# Patient Record
Sex: MALE | Race: WHITE | NOT HISPANIC OR LATINO | Employment: FULL TIME | URBAN - METROPOLITAN AREA
[De-identification: names, ages, dates, MRNs, and addresses within clinical notes are randomized per-mention and may not be internally consistent; named-entity substitution may affect disease eponyms.]

---

## 2017-04-30 ENCOUNTER — OFFICE VISIT (OUTPATIENT)
Dept: URGENT CARE | Facility: MEDICAL CENTER | Age: 52
End: 2017-04-30
Payer: COMMERCIAL

## 2017-04-30 PROCEDURE — 99213 OFFICE O/P EST LOW 20 MIN: CPT

## 2017-05-15 ENCOUNTER — ALLSCRIPTS OFFICE VISIT (OUTPATIENT)
Dept: OTHER | Facility: OTHER | Age: 52
End: 2017-05-15

## 2017-05-15 DIAGNOSIS — Z12.5 ENCOUNTER FOR SCREENING FOR MALIGNANT NEOPLASM OF PROSTATE: ICD-10-CM
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
## 2018-01-13 VITALS
WEIGHT: 207.38 LBS | RESPIRATION RATE: 16 BRPM | HEIGHT: 71 IN | HEART RATE: 76 BPM | SYSTOLIC BLOOD PRESSURE: 128 MMHG | DIASTOLIC BLOOD PRESSURE: 90 MMHG | BODY MASS INDEX: 29.03 KG/M2

## 2018-01-16 NOTE — PROGRESS NOTES
Assessment   1  Tick bite (919 4,E906 4) (W57 XXXA)    Plan    Doxycycline Hyclate 100 MG Oral Capsule; TAKE 1 CAPSULE EVERY 12 HOURS DAILY; Therapy: 30Apr2017 to (Evaluate:[id number])  Requested for: 30Apr2017; Last Rx:30Apr2017; Status: ACTIVE Ordered     Rx By: Kesha Sampson; Dispense: 14 Days ; #:28 Capsule; Refill: 0;      For: Tick bite, initial encounter; MARTY = N; Verified Transmission to [id number] Last Updated By: System, [employer]; 5/15/2017 3:17:48 PM      Discussion/Summary   Discussion Summary:    Take antibiotic as directed: eat yogurt to avoid GI upset  Medication Side Effects Reviewed: Possible side effects of new medications were reviewed with the patient/guardian today  Understands and agrees with treatment plan: The treatment plan was reviewed with the patient/guardian  The patient/guardian understands and agrees with the treatment plan    Counseling Documentation With Imm: The patient was counseled regarding diagnostic results,-- instructions for management,-- risk factor reductions,-- prognosis,-- patient and family education,-- impressions,-- risks and benefits of treatment options,-- importance of compliance with treatment  Follow Up Instructions: Follow Up with your Primary Care Provider in 1-2 days  If your symptoms worsen, go to the nearest [place] [address] Emergency Department  Chief Complaint   1  Skin Wound  Chief Complaint Free Text Note Form: pt  presents with 2 day hx of multiple tick bites to right leg      History of Present Illness   HPI: This is a 63-year-old male complaining of tick bite x2 days  Patient reports he pulled to take soft his right leg  Patient now reports erythema warmth such bull's eye lesion  patient denies any fever, chills, nausea, vomiting, eye joint pain muscle aches, numbness, tingling, loss sensation chest pain shortness of breath  Patient is unsure how long takes for attached to him      Hospital Based Practices Required Assessment:      Pain Assessment      the patient states they do not have pain  Abuse And Domestic Violence Screen       Yes, the patient is safe at home  -- The patient states no one is hurting them  Depression And Suicide Screen  No, the patient has not had thoughts of hurting themself  No, the patient has not felt depressed in the past 7 days  Prefered Language is  english  Primary Language is  english  Readiness To Learn: Receptive  Barriers To Learning: none  Preferred Learning: demonstration      Education Completed: disease/condition,-- medications-- and-- treatment/procedure      Teaching Method: verbal      Person Taught: patient      Evaluation Of Learning: verbalized/demonstrated understanding      Review of Systems   Focused-Male:      Constitutional: no fever or chills, feels well, no tiredness, no recent weight loss or weight gain  ENT: no complaints of earache, no loss of hearing, no nosebleeds or nasal discharge, no sore throat or hoarseness  Cardiovascular: no complaints of slow or fast heart rate, no chest pain, no palpitations, no leg claudication or lower extremity edema  Respiratory: no complaints of shortness of breath, no wheezing or cough, no dyspnea on exertion, no orthopnea or PND  Gastrointestinal: no complaints of abdominal pain, no constipation, no nausea or vomiting, no diarrhea or bloody stools  Genitourinary: no complaints of dysuria or incontinence, no hesitancy, no nocturia, no genital lesion, no inadequacy of penile erection  Musculoskeletal: no complaints of arthralgia, no myalgia, no joint swelling or stiffness, no limb pain or swelling  Integumentary: no complaints of skin rash or lesion, no itching or dry skin, no skin wounds-- and-- as noted in HPI  Neurological: no complaints of headache, no confusion, no numbness or tingling, no dizziness or fainting        Past Medical History   Active Problems And Past Medical History Reviewed: The active problems and past medical history were reviewed and updated today  Family History   Family History Reviewed: The family history was reviewed and updated today  Social History    · Current some day smoker (305 1) (F17 200)  Social History Reviewed: The social history was reviewed and updated today  Surgical History   Surgical History Reviewed: The surgical history was reviewed and updated today  Current Meds    1  No Reported Medications Recorded  Medication List Reviewed: The medication list was reviewed and updated today  Allergies   1  No Known Drug Allergies    Vitals   Signs   Recorded: 30Apr2017 10:56AM   Temperature: 97 8 F  Heart Rate: 76  Respiration: 14  Systolic: 004  Diastolic: 78  Weight: 789 lb   O2 Saturation: 99  Pain Scale: 0    Physical Exam        Constitutional      General appearance: No acute distress, well appearing and well nourished  Pulmonary      Respiratory effort: No increased work of breathing or signs of respiratory distress  Auscultation of lungs: Clear to auscultation  Cardiovascular      Auscultation of heart: Normal rate and rhythm, normal S1 and S2, without murmurs  Lymphatic      Palpation of lymph nodes in neck: No lymphadenopathy  Skin      Examination of the skin for lesions: Abnormal  -- RLE: 2 2 bites no remaining foreign body bull's eye lesion  Message   Return to work or school:    Beckie Gray is under my professional care  He was seen in my office on 04/302017        Please excuse illness           Signatures    Electronically signed by : Vishnu Alicea, [facility]; Jan 13 2018 10:28AM EST                       (Author)     Electronically signed by : OWEN Martinez ; Zuhair 15 2018 11:47AM EST                       (Co-author)

## 2018-01-18 NOTE — PROGRESS NOTES
Assessment    1  Tick bite (919 4,E906 4) (W57 XXXA)    Plan  Tick bite, initial encounter    · Doxycycline Hyclate 100 MG Oral Capsule; TAKE 1 CAPSULE EVERY 12 HOURS  DAILY    Discussion/Summary    Multiple tick bites which are difficult to assess via virtual visit  I have explained to the patient that proper evaluation for a Bulls eye rash is necessary in order to diagnose Lyme's disease and determine the course of treatment which can be up to 21 days of Doxycycline, therefore I recommend that he proceed to one of our walk-in centers for further evaluation and treatment  Patient verbalizes understanding and states he will go to [facility]  The treatment plan was reviewed with the patient/guardian  The patient/guardian understands and agrees with the treatment plan     If your symptoms worsen follow up at the nearest [place] [address] Emergency Room  If your symptoms worsen follow up at the nearest Emergency Room  Chief Complaint    1  Skin Wound  tick bite      History of Present Illness  45 yo male c/o 2 areas of tick bite two days ago, states he pulled out a tick from one of the sites, now has pain and redness surrounding the area  No fever  No drainage from the sites  Review of Systems    Constitutional: no fever or chills, feels well, no tiredness, no recent weight loss or weight gain  Integumentary: as noted in HPI  Active Problems    1  Tick bite, initial encounter (919 4,E906 4) [facility])    Past Medical History    The active problems and past medical history were reviewed and updated today  Social History    · Current some day smoker (305 1) (F17 200)  The social history was reviewed and updated today  Current Meds    The medication list was reviewed and updated today  Allergies    1   No Known Drug Allergies    Vitals  Signs   Recorded: 30Apr2017 10:56AM   Temperature: 97 8 F  Heart Rate: 76  Respiration: 14  Systolic: 568  Diastolic: 78  Weight: 957 lb   O2 Saturation: 99  Pain Scale: 0    Observations Made  AAOx3 in NAD  Right thigh: 2 large lesions with central scabbing and surrounding erythema  Lighting and camera are making it difficulty to assess for possible bulls eye rash surrounding the lesions        Signatures   Electronically signed by : RHYS Calloway ; Apr 30 2017  4:21PM EST                       (Author)

## 2019-04-28 ENCOUNTER — OFFICE VISIT (OUTPATIENT)
Dept: URGENT CARE | Facility: MEDICAL CENTER | Age: 54
End: 2019-04-28
Payer: COMMERCIAL

## 2019-04-28 VITALS
DIASTOLIC BLOOD PRESSURE: 118 MMHG | BODY MASS INDEX: 31.3 KG/M2 | WEIGHT: 223.6 LBS | TEMPERATURE: 97.4 F | HEIGHT: 71 IN | OXYGEN SATURATION: 98 % | SYSTOLIC BLOOD PRESSURE: 160 MMHG | HEART RATE: 76 BPM | RESPIRATION RATE: 18 BRPM

## 2019-04-28 DIAGNOSIS — L03.119 CELLULITIS OF FOOT: Primary | ICD-10-CM

## 2019-04-28 PROCEDURE — 99213 OFFICE O/P EST LOW 20 MIN: CPT | Performed by: PHYSICIAN ASSISTANT

## 2019-04-28 RX ORDER — IBUPROFEN 600 MG/1
600 TABLET ORAL EVERY 8 HOURS PRN
Qty: 15 TABLET | Refills: 0 | Status: SHIPPED | OUTPATIENT
Start: 2019-04-28 | End: 2019-05-16 | Stop reason: ALTCHOICE

## 2019-04-28 RX ORDER — CEPHALEXIN 500 MG/1
500 CAPSULE ORAL EVERY 6 HOURS SCHEDULED
Qty: 28 CAPSULE | Refills: 0 | Status: SHIPPED | OUTPATIENT
Start: 2019-04-28 | End: 2019-05-05

## 2019-05-16 ENCOUNTER — OFFICE VISIT (OUTPATIENT)
Dept: FAMILY MEDICINE CLINIC | Facility: CLINIC | Age: 54
End: 2019-05-16
Payer: COMMERCIAL

## 2019-05-16 VITALS
OXYGEN SATURATION: 97 % | WEIGHT: 218 LBS | DIASTOLIC BLOOD PRESSURE: 108 MMHG | SYSTOLIC BLOOD PRESSURE: 148 MMHG | TEMPERATURE: 97.1 F | HEIGHT: 71 IN | BODY MASS INDEX: 30.52 KG/M2 | HEART RATE: 85 BPM | RESPIRATION RATE: 17 BRPM

## 2019-05-16 DIAGNOSIS — M25.50 ARTHRALGIA, UNSPECIFIED JOINT: ICD-10-CM

## 2019-05-16 DIAGNOSIS — Z12.5 SCREENING FOR PROSTATE CANCER: ICD-10-CM

## 2019-05-16 DIAGNOSIS — Z13.1 SCREENING FOR DIABETES MELLITUS: ICD-10-CM
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
## 2019-05-16 DIAGNOSIS — I10 ESSENTIAL HYPERTENSION: Primary | ICD-10-CM

## 2019-05-16 PROCEDURE — 3008F BODY MASS INDEX DOCD: CPT | Performed by: FAMILY MEDICINE

## 2019-05-16 PROCEDURE — 99214 OFFICE O/P EST MOD 30 MIN: CPT | Performed by: FAMILY MEDICINE

## 2019-05-16 RX ORDER — LISINOPRIL 10 MG/1
10 TABLET ORAL DAILY
Qty: 30 TABLET | Refills: 3 | Status: SHIPPED | OUTPATIENT
Start: 2019-05-16 | End: 2019-07-16 | Stop reason: SDUPTHER

## 2019-06-01 ENCOUNTER — LAB (OUTPATIENT)
Dept: LAB | Facility: CLINIC | Age: 54
End: 2019-06-01
Payer: COMMERCIAL

## 2019-06-01 DIAGNOSIS — Z13.1 SCREENING FOR DIABETES MELLITUS: ICD-10-CM

## 2019-06-01 DIAGNOSIS — Z12.5 SCREENING FOR PROSTATE CANCER: ICD-10-CM
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
## 2019-06-01 DIAGNOSIS — M25.50 ARTHRALGIA, UNSPECIFIED JOINT: ICD-10-CM

## 2019-06-01 LAB
ALBUMIN SERPL BCP-MCNC: 3.6 G/DL (ref 3.5–5)
ALP SERPL-CCNC: 69 U/L (ref 46–116)
ALT SERPL W P-5'-P-CCNC: 25 U/L (ref 12–78)
ANION GAP SERPL CALCULATED.3IONS-SCNC: 11 MMOL/L (ref 4–13)
AST SERPL W P-5'-P-CCNC: 22 U/L (ref 5–45)
BASOPHILS # BLD AUTO: 0.08 THOUSANDS/ΜL (ref 0–0.1)
BASOPHILS NFR BLD AUTO: 1 % (ref 0–1)
BILIRUB SERPL-MCNC: 0.5 MG/DL (ref 0.2–1)
BUN SERPL-MCNC: 12 MG/DL (ref 5–25)
CALCIUM SERPL-MCNC: 9.2 MG/DL (ref 8.3–10.1)
CHLORIDE SERPL-SCNC: 105 MMOL/L (ref 100–108)
CHOLEST SERPL-MCNC: 179 MG/DL (ref 50–200)
CO2 SERPL-SCNC: 23 MMOL/L (ref 21–32)
CREAT SERPL-MCNC: 0.87 MG/DL (ref 0.6–1.3)
CRP SERPL QL: 6.1 MG/L
EOSINOPHIL # BLD AUTO: 0.14 THOUSAND/ΜL (ref 0–0.61)
EOSINOPHIL NFR BLD AUTO: 2 % (ref 0–6)
ERYTHROCYTE [DISTWIDTH] IN BLOOD BY AUTOMATED COUNT: 13.1 % (ref 11.6–15.1)
ERYTHROCYTE [SEDIMENTATION RATE] IN BLOOD: 1 MM/HOUR (ref 0–10)
GFR SERPL CREATININE-BSD FRML MDRD: 99 ML/MIN/1.73SQ M
GLUCOSE P FAST SERPL-MCNC: 90 MG/DL (ref 65–99)
HCT VFR BLD AUTO: 51.3 % (ref 36.5–49.3)
HDLC SERPL-MCNC: 34 MG/DL (ref 40–60)
HGB BLD-MCNC: 17.8 G/DL (ref 12–17)
IMM GRANULOCYTES # BLD AUTO: 0.03 THOUSAND/UL (ref 0–0.2)
IMM GRANULOCYTES NFR BLD AUTO: 1 % (ref 0–2)
LDLC SERPL CALC-MCNC: 119 MG/DL (ref 0–100)
LYMPHOCYTES # BLD AUTO: 1.56 THOUSANDS/ΜL (ref 0.6–4.47)
LYMPHOCYTES NFR BLD AUTO: 25 % (ref 14–44)
MCH RBC QN AUTO: 30.4 PG (ref 26.8–34.3)
MCHC RBC AUTO-ENTMCNC: 34.7 G/DL (ref 31.4–37.4)
MCV RBC AUTO: 88 FL (ref 82–98)
MONOCYTES # BLD AUTO: 0.37 THOUSAND/ΜL (ref 0.17–1.22)
MONOCYTES NFR BLD AUTO: 6 % (ref 4–12)
NEUTROPHILS # BLD AUTO: 4.13 THOUSANDS/ΜL (ref 1.85–7.62)
NEUTS SEG NFR BLD AUTO: 65 % (ref 43–75)
NRBC BLD AUTO-RTO: 0 /100 WBCS
PLATELET # BLD AUTO: 189 THOUSANDS/UL (ref 149–390)
PMV BLD AUTO: 9.7 FL (ref 8.9–12.7)
POTASSIUM SERPL-SCNC: 4.5 MMOL/L (ref 3.5–5.3)
PROT SERPL-MCNC: 7 G/DL (ref 6.4–8.2)
PSA SERPL-MCNC: 0.4 NG/ML (ref 0–4)
RBC # BLD AUTO: 5.86 MILLION/UL (ref 3.88–5.62)
SODIUM SERPL-SCNC: 139 MMOL/L (ref 136–145)
TRIGL SERPL-MCNC: 128 MG/DL
WBC # BLD AUTO: 6.31 THOUSAND/UL (ref 4.31–10.16)

## 2019-06-01 PROCEDURE — 80053 COMPREHEN METABOLIC PANEL: CPT

## 2019-06-01 PROCEDURE — 86430 RHEUMATOID FACTOR TEST QUAL: CPT

## 2019-06-01 PROCEDURE — G0103 PSA SCREENING: HCPCS

## 2019-06-01 PROCEDURE — 86757 RICKETTSIA ANTIBODY: CPT

## 2019-06-01 PROCEDURE — 86038 ANTINUCLEAR ANTIBODIES: CPT

## 2019-06-01 PROCEDURE — 80061 LIPID PANEL: CPT

## 2019-06-01 PROCEDURE — 86140 C-REACTIVE PROTEIN: CPT

## 2019-06-01 PROCEDURE — 86618 LYME DISEASE ANTIBODY: CPT

## 2019-06-01 PROCEDURE — 85025 COMPLETE CBC W/AUTO DIFF WBC: CPT

## 2019-06-01 PROCEDURE — 36415 COLL VENOUS BLD VENIPUNCTURE: CPT

## 2019-06-01 PROCEDURE — 85652 RBC SED RATE AUTOMATED: CPT

## 2019-06-03 LAB
RHEUMATOID FACT SER QL LA: NEGATIVE
RYE IGE QN: NEGATIVE

## 2019-06-04 LAB
B BURGDOR IGG SER IA-ACNC: 0.13
B BURGDOR IGM SER IA-ACNC: 0.2

## 2019-06-05 LAB — R RICKETTSI IGM SER QL IA: 0.3 INDEX (ref 0–0.89)

## 2019-07-16 ENCOUNTER — OFFICE VISIT (OUTPATIENT)
Dept: FAMILY MEDICINE CLINIC | Facility: CLINIC | Age: 54
End: 2019-07-16
Payer: COMMERCIAL

## 2019-07-16 VITALS
BODY MASS INDEX: 29.42 KG/M2 | SYSTOLIC BLOOD PRESSURE: 124 MMHG | HEART RATE: 68 BPM | WEIGHT: 217.2 LBS | DIASTOLIC BLOOD PRESSURE: 90 MMHG | HEIGHT: 72 IN | OXYGEN SATURATION: 96 % | TEMPERATURE: 97.3 F | RESPIRATION RATE: 16 BRPM

## 2019-07-16 DIAGNOSIS — I10 ESSENTIAL HYPERTENSION: Primary | ICD-10-CM

## 2019-07-16 PROCEDURE — 99213 OFFICE O/P EST LOW 20 MIN: CPT | Performed by: FAMILY MEDICINE

## 2019-07-16 RX ORDER — LOSARTAN POTASSIUM 50 MG/1
50 TABLET ORAL DAILY
Qty: 30 TABLET | Refills: 5 | Status: SHIPPED | OUTPATIENT
Start: 2019-07-16 | End: 2019-08-23 | Stop reason: DRUGHIGH

## 2019-07-16 NOTE — PROGRESS NOTES
Assessment/Plan:    No problem-specific Assessment & Plan notes found for this encounter  Diagnoses and all orders for this visit:    Essential hypertension  Comments:  Pt stable on exam   With cough, will switch pt to an ARB, Losartan  Continue low salt diet, regular activity  Pt urged again to stop smoking  Orders:  -     losartan (COZAAR) 50 mg tablet; Take 1 tablet (50 mg total) by mouth daily  -     Basic metabolic panel; Future          Subjective:      Patient ID: Chanelle Gould is a 48 y o  male  Nyla Perez is here today in f/u today  Taking his BP med, watching salt intake  Unfortunately, he has a dry cough - starting soon after going on Lisinopril  The following portions of the patient's history were reviewed and updated as appropriate: allergies, current medications, past family history, past social history, past surgical history and problem list     History reviewed  No pertinent past medical history  Current Outpatient Medications:     losartan (COZAAR) 50 mg tablet, Take 1 tablet (50 mg total) by mouth daily, Disp: 30 tablet, Rfl: 5    No Known Allergies    Social History     Tobacco Use    Smoking status: Current Every Day Smoker     Packs/day: 0 50     Years: 32 00     Pack years: 16 00    Smokeless tobacco: Never Used   Substance Use Topics    Alcohol use: Yes     Alcohol/week: 1 0 standard drinks     Types: 1 Cans of beer per week     Frequency: Monthly or less     Drinks per session: 1 or 2     Binge frequency: Less than monthly     Comment: rare social use    Drug use: Never       Review of Systems   Constitutional: Negative for activity change and fever  HENT: Negative for congestion and rhinorrhea  No throat or tongue swelling  Respiratory: Positive for cough  Negative for shortness of breath  Cardiovascular: Negative for chest pain           Objective:      /90   Pulse 68   Temp (!) 97 3 °F (36 3 °C)   Resp 16   Ht 5' 11 93" (1 827 m) Wt 98 5 kg (217 lb 3 2 oz)   SpO2 96%   BMI 29 52 kg/m²   Repeat /96 RA seated  Physical Exam   Constitutional: He is oriented to person, place, and time  He appears well-developed and well-nourished  No distress  HENT:   Head: Normocephalic and atraumatic  Eyes: Conjunctivae are normal    Cardiovascular: Normal rate, regular rhythm and normal heart sounds  Exam reveals no gallop and no friction rub  No murmur heard  Pulmonary/Chest: Effort normal and breath sounds normal  No stridor  No respiratory distress  He has no wheezes  He has no rales  Neurological: He is alert and oriented to person, place, and time  Skin: He is not diaphoretic  Psychiatric: He has a normal mood and affect  His behavior is normal  Judgment and thought content normal    Nursing note and vitals reviewed

## 2019-08-23 ENCOUNTER — OFFICE VISIT (OUTPATIENT)
Dept: FAMILY MEDICINE CLINIC | Facility: CLINIC | Age: 54
End: 2019-08-23
Payer: COMMERCIAL

## 2019-08-23 VITALS
WEIGHT: 218 LBS | BODY MASS INDEX: 29.53 KG/M2 | SYSTOLIC BLOOD PRESSURE: 142 MMHG | HEIGHT: 72 IN | HEART RATE: 69 BPM | DIASTOLIC BLOOD PRESSURE: 100 MMHG | TEMPERATURE: 96.4 F | RESPIRATION RATE: 16 BRPM | OXYGEN SATURATION: 99 %

## 2019-08-23 DIAGNOSIS — Z72.0 TOBACCO USE: ICD-10-CM

## 2019-08-23 DIAGNOSIS — Z00.00 WELLNESS EXAMINATION: Primary | ICD-10-CM

## 2019-08-23 DIAGNOSIS — M25.50 ARTHRALGIA, UNSPECIFIED JOINT: ICD-10-CM

## 2019-08-23 DIAGNOSIS — Z13.1 SCREENING FOR DIABETES MELLITUS: ICD-10-CM

## 2019-08-23 DIAGNOSIS — I10 ESSENTIAL HYPERTENSION: ICD-10-CM

## 2019-08-23 DIAGNOSIS — Z12.11 SCREENING FOR COLON CANCER: ICD-10-CM

## 2019-08-23 DIAGNOSIS — G89.29 CHRONIC MIDLINE LOW BACK PAIN WITHOUT SCIATICA: ICD-10-CM

## 2019-08-23 DIAGNOSIS — M54.50 CHRONIC MIDLINE LOW BACK PAIN WITHOUT SCIATICA: ICD-10-CM

## 2019-08-23 DIAGNOSIS — Z12.5 SCREENING FOR PROSTATE CANCER: ICD-10-CM

## 2019-08-23 PROCEDURE — 99396 PREV VISIT EST AGE 40-64: CPT | Performed by: FAMILY MEDICINE

## 2019-08-23 RX ORDER — VARENICLINE TARTRATE 25 MG
KIT ORAL
Qty: 53 TABLET | Refills: 0 | Status: SHIPPED | OUTPATIENT
Start: 2019-08-23 | End: 2021-11-06 | Stop reason: ALTCHOICE

## 2019-08-23 RX ORDER — LOSARTAN POTASSIUM 100 MG/1
100 TABLET ORAL DAILY
Qty: 30 TABLET | Refills: 5 | Status: SHIPPED | OUTPATIENT
Start: 2019-08-23 | End: 2020-04-06

## 2019-08-23 NOTE — PROGRESS NOTES
Assessment/Plan:    No problem-specific Assessment & Plan notes found for this encounter  Diagnoses and all orders for this visit:    Wellness examination  Comments:  Amelia Daniels is stable on exam   He is to continue to focus on a low salt diet, stopping smoking, and regular exercise  Screening for diabetes mellitus    Screening for prostate cancer    Screening for colon cancer  Comments:  Pt referred locally to GI  Orders:  -     Ambulatory referral to Gastroenterology; Future    Tobacco use  Comments:  Discussed at length, potential treatments, potential R/SE, etc - pt reports that he wants to try to quit -> will start Chantix  Orders:  -     varenicline (CHANTIX BRANDON) 0 5 MG X 11 & 1 MG X 42 tablet; Take one 0 5mg tab by mouth 1x daily for 3 days, then increase to one 0 5mg tab 2x daily for 3 days, then increase to one 1mg tab 2x daily    Essential hypertension  Comments:  BP still too high -> will increase Losartan to 100mg QD at this time  Orders:  -     losartan (COZAAR) 100 MG tablet; Take 1 tablet (100 mg total) by mouth daily    Arthralgia, unspecified joint  Comments:  Chronic; stable - pt declines referral to Rheumatology at this time  Chronic midline low back pain without sciatica  Comments:  Ongoing - pt declines referral for MRI at this time  Continue stretching, heat to the region, etc           Subjective:      Patient ID: Jax Garcia is a 48 y o  male  Amelia Daniels is here in f/u  Needs colon cancer screening - discussed at length  Still smoking - we discussed  No hx of seizures  PSA  0 4, Gl 90, Cr/LFTs normal,   Declines GIOVANNI/prostatic exam, imaging for back (chronic low back pain; hx epidurals in past; no sciatica; no hx of surgery), Rheumatology referral (arthralgias - hands and knees; ?psoriatic arthritis?) today        The following portions of the patient's history were reviewed and updated as appropriate: allergies, current medications, past family history, past social history, past surgical history and problem list     History reviewed  No pertinent past medical history  Current Outpatient Medications:     losartan (COZAAR) 100 MG tablet, Take 1 tablet (100 mg total) by mouth daily, Disp: 30 tablet, Rfl: 5    varenicline (CHANTIX BRANDON) 0 5 MG X 11 & 1 MG X 42 tablet, Take one 0 5mg tab by mouth 1x daily for 3 days, then increase to one 0 5mg tab 2x daily for 3 days, then increase to one 1mg tab 2x daily, Disp: 53 tablet, Rfl: 0    No Known Allergies    Social History     Tobacco Use    Smoking status: Current Every Day Smoker     Packs/day: 0 50     Years: 32 00     Pack years: 16 00    Smokeless tobacco: Never Used   Substance Use Topics    Alcohol use: Yes     Alcohol/week: 1 0 standard drinks     Types: 1 Cans of beer per week     Frequency: Monthly or less     Drinks per session: 1 or 2     Binge frequency: Less than monthly     Comment: rare social use    Drug use: Never         Review of Systems   Constitutional: Negative for activity change  Respiratory: Negative for shortness of breath  Cardiovascular: Negative for chest pain  Gastrointestinal: Negative for abdominal pain and blood in stool  Genitourinary: Negative for decreased urine volume  Musculoskeletal: Positive for arthralgias and back pain  Skin:        Hx of psoriasis  Psychiatric/Behavioral: Negative for dysphoric mood  The patient is not nervous/anxious  Objective:      /100   Pulse 69   Temp (!) 96 4 °F (35 8 °C)   Resp 16   Ht 6' 0 24" (1 835 m)   Wt 98 9 kg (218 lb)   SpO2 99%   BMI 29 37 kg/m²   Repeat BP = 128/100 LA seated  Physical Exam   Constitutional: He is oriented to person, place, and time  He appears well-developed and well-nourished  No distress  HENT:   Head: Normocephalic and atraumatic     Right Ear: Hearing, tympanic membrane, external ear and ear canal normal    Left Ear: Hearing, tympanic membrane, external ear and ear canal normal  Mouth/Throat: Oropharynx is clear and moist  No oropharyngeal exudate  Eyes: Conjunctivae are normal    Neck: Normal range of motion  Neck supple  No thyromegaly present  Cardiovascular: Normal rate, regular rhythm and normal heart sounds  Exam reveals no gallop and no friction rub  No murmur heard  Pulmonary/Chest: Effort normal and breath sounds normal  No stridor  No respiratory distress  He has no wheezes  He has no rales  Abdominal: Soft  Bowel sounds are normal  He exhibits no distension and no mass  There is no tenderness  There is no rebound and no guarding  Lymphadenopathy:     He has no cervical adenopathy  Neurological: He is alert and oriented to person, place, and time  Skin: He is not diaphoretic  Pt with chronic, dry plaques to the extensor surfaces of the knees and elbows; no erythema - hx of psoriasis  Psychiatric: His speech is normal and behavior is normal  Judgment and thought content normal  His affect is blunt  Nursing note and vitals reviewed

## 2019-09-20 ENCOUNTER — OFFICE VISIT (OUTPATIENT)
Dept: URGENT CARE | Age: 54
End: 2019-09-20
Payer: COMMERCIAL

## 2019-09-20 VITALS
OXYGEN SATURATION: 98 % | RESPIRATION RATE: 20 BRPM | DIASTOLIC BLOOD PRESSURE: 104 MMHG | TEMPERATURE: 97.1 F | HEART RATE: 67 BPM | SYSTOLIC BLOOD PRESSURE: 138 MMHG

## 2019-09-20 DIAGNOSIS — S80.861A INSECT BITE OF LOWER LEG WITH LOCAL REACTION, RIGHT, INITIAL ENCOUNTER: Primary | ICD-10-CM
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
## 2019-09-20 PROCEDURE — 99203 OFFICE O/P NEW LOW 30 MIN: CPT | Performed by: PHYSICIAN ASSISTANT

## 2019-09-20 RX ORDER — LOSARTAN POTASSIUM 50 MG/1
TABLET ORAL
COMMUNITY
Start: 2019-09-17 | End: 2021-04-30 | Stop reason: DRUGHIGH

## 2019-09-20 RX ORDER — PREDNISONE 10 MG/1
TABLET ORAL
Qty: 21 TABLET | Refills: 0 | Status: SHIPPED | OUTPATIENT
Start: 2019-09-20 | End: 2021-11-06 | Stop reason: ALTCHOICE

## 2019-09-20 NOTE — PATIENT INSTRUCTIONS
Cool compresses as directed    Follow-up with your primary care physician in 3-4 days if symptoms persist    Go to emergency room if your symptoms are worsening    May take Benadryl as needed

## 2019-09-20 NOTE — PROGRESS NOTES
[facility]        NAME: Consuelo Menon is a 47 y o  male  : 1965    MRN: [id number]  DATE: 2019  TIME: 6:01 PM    Assessment and Plan   Insect bite of lower leg with local reaction, right, initial encounter [D01 622G, W57  XXXA]  1  Insect bite of lower leg with local reaction, right, initial encounter  predniSONE 10 mg tablet         Patient Instructions       Follow up with PCP in 3-5 days  Proceed to  ER if symptoms worsen  Chief Complaint     Chief Complaint   Patient presents with   Mary Isis 83     right leg a spider bite it's been for 2 days  History of Present Illness       Patient here for evaluation of a bug bite to his right lower leg  Patient has to bites on the lower leg  He denies any numbness, tingling, fevers, chills, body aches  Patient states they are very itchy  Review of Systems   Review of Systems   Constitutional: Negative  Skin: Positive for rash  Neurological: Negative            Current Medications       Current Outpatient Medications:     losartan (COZAAR) 100 MG tablet, Take 1 tablet (100 mg total) by mouth daily, Disp: 30 tablet, Rfl: 5    losartan (COZAAR) 50 mg tablet, , Disp: , Rfl:     varenicline (CHANTIX BRANDON) 0 5 MG X 11 & 1 MG X 42 tablet, Take one 0 5mg tab by mouth 1x daily for 3 days, then increase to one 0 5mg tab 2x daily for 3 days, then increase to one 1mg tab 2x daily, Disp: 53 tablet, Rfl: 0    predniSONE 10 mg tablet, Six tablets day 1; 5 tablets day to; 4 tablets day 3; 3 tablets day 4; 2 tablets day 5; and 1 tablet day 6, Disp: 21 tablet, Rfl: 0    Current Allergies     Allergies as of 2019    (No Known Allergies)            The following portions of the patient's history were reviewed and updated as appropriate: allergies, current medications, past family history, past medical history, past social history, past surgical history and problem list      Past Medical History:   Diagnosis Date    Hypertension        Past Surgical History:   Procedure Laterality Date    KNEE SURGERY         History reviewed  No pertinent family history  Medications have been verified  Objective   BP (!) 138/104 (BP Location: Left arm, Patient Position: Sitting, Cuff Size: Large)   Pulse 67   Temp (!) 97 1 °F (36 2 °C) (Temporal)   Resp 20   SpO2 98%        Physical Exam     Physical Exam   Constitutional: He is oriented to person, place, and time  He appears well-developed and well-nourished  No distress  HENT:   Head: Normocephalic and atraumatic  Neurological: He is alert and oriented to person, place, and time  Skin: Skin is warm and dry  He is not diaphoretic  One dime-sized and 1 quarter-sized macular lesion of the right lower extremity  No warmth  No induration  No central clearing  No bulls eye rash  No tenderness   Psychiatric: He has a normal mood and affect  His behavior is normal  Judgment and thought content normal    Nursing note and vitals reviewed

## 2019-10-11 ENCOUNTER — OFFICE VISIT (OUTPATIENT)
Dept: URGENT CARE | Age: 54
End: 2019-10-11
Payer: COMMERCIAL

## 2019-10-11 VITALS
WEIGHT: 218 LBS | HEART RATE: 80 BPM | RESPIRATION RATE: 18 BRPM | SYSTOLIC BLOOD PRESSURE: 128 MMHG | BODY MASS INDEX: 30.52 KG/M2 | TEMPERATURE: 97.8 F | HEIGHT: 71 IN | DIASTOLIC BLOOD PRESSURE: 90 MMHG | OXYGEN SATURATION: 95 %

## 2019-10-11 DIAGNOSIS — J06.9 ACUTE UPPER RESPIRATORY INFECTION: Primary | ICD-10-CM

## 2019-10-11 DIAGNOSIS — H10.33 ACUTE CONJUNCTIVITIS OF BOTH EYES, UNSPECIFIED ACUTE CONJUNCTIVITIS TYPE: ICD-10-CM

## 2019-10-11 DIAGNOSIS — J01.20 ACUTE NON-RECURRENT ETHMOIDAL SINUSITIS: ICD-10-CM

## 2019-10-11 PROCEDURE — 99213 OFFICE O/P EST LOW 20 MIN: CPT | Performed by: FAMILY MEDICINE

## 2019-10-11 RX ORDER — OFLOXACIN 3 MG/ML
1 SOLUTION/ DROPS OPHTHALMIC 4 TIMES DAILY
Qty: 5 ML | Refills: 0 | Status: SHIPPED | OUTPATIENT
Start: 2019-10-11 | End: 2019-10-18

## 2019-10-11 RX ORDER — AMOXICILLIN AND CLAVULANATE POTASSIUM 875; 125 MG/1; MG/1
1 TABLET, FILM COATED ORAL EVERY 12 HOURS SCHEDULED
Qty: 20 TABLET | Refills: 0 | Status: SHIPPED | OUTPATIENT
Start: 2019-10-11 | End: 2019-10-21

## 2019-10-11 NOTE — PATIENT INSTRUCTIONS
Augmentin twice a day until finished (please take probiotics)  1 eye drop in each eye 4 times a day for 7-10 days  Cold/cough/sinus medication as needed  Tylenol, or ibuprofen (Advil/Motrin) as needed  Recheck/follow-up with family physician/ophthalmologist as needed  Please go to the hospital emergency department if needed

## 2019-10-11 NOTE — PROGRESS NOTES
[facility]        NAME: Analilia Lay is a 47 y o  male  : 1965    MRN: [id number]  DATE: 2019  TIME: 8:46 AM    Assessment and Plan   Acute upper respiratory infection [J06 9]  1  Acute upper respiratory infection     2  Acute non-recurrent ethmoidal sinusitis  amoxicillin-clavulanate (AUGMENTIN) 875-125 mg per tablet   3  Acute conjunctivitis of both eyes, unspecified acute conjunctivitis type  ofloxacin (OCUFLOX) 0 3 % ophthalmic solution         Patient Instructions     Patient Instructions   Augmentin twice a day until finished (please take probiotics)  1 eye drop in each eye 4 times a day for 7-10 days  Cold/cough/sinus medication as needed  Tylenol, or ibuprofen (Advil/Motrin) as needed  Recheck/follow-up with family physician/ophthalmologist as needed  Please go to the hospital emergency department if needed  Follow up with PCP/Ophthalmologist in 3-5 days  Proceed to  ER if symptoms worsen  Chief Complaint     Chief Complaint   Patient presents with    Cold Like Symptoms     sinus issue x 1 week           History of Present Illness       Congestion, sinus pressure, cough; redness and drainage of both eyes - patient is wearing glasses (patient does not wear contacts)      Review of Systems   Review of Systems   HENT: Positive for congestion and sinus pressure  Eyes: Positive for discharge and redness  Respiratory: Positive for cough  Cardiovascular: Negative  Musculoskeletal: Negative  Skin: Negative  Neurological: Negative            Current Medications       Current Outpatient Medications:     amoxicillin-clavulanate (AUGMENTIN) 875-125 mg per tablet, Take 1 tablet by mouth every 12 (twelve) hours for 20 doses, Disp: 20 tablet, Rfl: 0    losartan (COZAAR) 100 MG tablet, Take 1 tablet (100 mg total) by mouth daily, Disp: 30 tablet, Rfl: 5    losartan (COZAAR) 50 mg tablet, , Disp: , Rfl:     ofloxacin (OCUFLOX) 0 3 % ophthalmic solution, Administer 1 drop to both eyes 4 (four) times a day for 7 days, Disp: 5 mL, Rfl: 0    predniSONE 10 mg tablet, Six tablets day 1; 5 tablets day to; 4 tablets day 3; 3 tablets day 4; 2 tablets day 5; and 1 tablet day 6 (Patient not taking: Reported on 10/11/2019), Disp: 21 tablet, Rfl: 0    varenicline (CHANTIX BRANDON) 0 5 MG X 11 & 1 MG X 42 tablet, Take one 0 5mg tab by mouth 1x daily for 3 days, then increase to one 0 5mg tab 2x daily for 3 days, then increase to one 1mg tab 2x daily (Patient not taking: Reported on 10/11/2019), Disp: 53 tablet, Rfl: 0    Current Allergies     Allergies as of 10/11/2019    (No Known Allergies)            The following portions of the patient's history were reviewed and updated as appropriate: allergies, current medications, past family history, past medical history, past social history, past surgical history and problem list      Past Medical History:   Diagnosis Date    Hypertension        Past Surgical History:   Procedure Laterality Date    KNEE SURGERY         History reviewed  No pertinent family history  Medications have been verified  Objective   /90   Pulse 80   Temp 97 8 °F (36 6 °C) (Temporal)   Resp 18   Ht 5' 11" (1 803 m)   Wt 98 9 kg (218 lb)   SpO2 95%   BMI 30 40 kg/m²        Physical Exam     Physical Exam   Constitutional: He is oriented to person, place, and time  He appears well-developed and well-nourished  Patient appears uncomfortable   HENT:   Right Ear: External ear normal    Left Ear: External ear normal    Nasal congestion; discomfort over the ethmoid sinuses bilaterally; injection of the oropharynx   Eyes: Pupils are equal, round, and reactive to light  EOM are normal  Right eye exhibits discharge  Left eye exhibits discharge  Injection and drainage of both conjunctiva   Neck: Normal range of motion  Neck supple  Cardiovascular: Normal rate, regular rhythm and normal heart sounds     Pulmonary/Chest: Effort normal  No respiratory distress  He has no wheezes  Coarse breath sounds   Neurological: He is alert and oriented to person, place, and time  No nuchal rigidity   Skin:   Good color and turgor   Psychiatric: He has a normal mood and affect  His behavior is normal    Nursing note and vitals reviewed

## 2019-10-11 NOTE — LETTER
October 11, 2019     Patient: Yovany Dill   YOB: 1965   Date of Visit: 10/11/2019       To Whom It May Concern: It is my medical opinion that Christy Bradford may return to work on 10/14/2019  If you have any questions or concerns, please don't hesitate to call           Sincerely,        Juan Hitchcock DO    CC: No Recipients

## 2020-04-05 DIAGNOSIS — I10 ESSENTIAL HYPERTENSION: ICD-10-CM

## 2020-04-06 RX ORDER — LOSARTAN POTASSIUM 100 MG/1
TABLET ORAL
Qty: 90 TABLET | Refills: 1 | Status: SHIPPED | OUTPATIENT
Start: 2020-04-06 | End: 2021-04-15

## 2020-04-14 DIAGNOSIS — Z12.11 SCREENING FOR COLON CANCER: Primary | ICD-10-CM

## 2020-10-10 ENCOUNTER — TELEPHONE (OUTPATIENT)
Dept: GASTROENTEROLOGY | Facility: CLINIC | Age: 55
End: 2020-10-10

## 2021-04-15 DIAGNOSIS — I10 ESSENTIAL HYPERTENSION: ICD-10-CM

## 2021-04-15 RX ORDER — LOSARTAN POTASSIUM 100 MG/1
TABLET ORAL
Qty: 90 TABLET | Refills: 1 | Status: SHIPPED | OUTPATIENT
Start: 2021-04-15 | End: 2021-11-13 | Stop reason: SDUPTHER

## 2021-04-15 NOTE — TELEPHONE ENCOUNTER
Requested medication(s) are due for refill today: Yes  Patient has already received a courtesy refill: No  Other reason request has been forwarded to provider: per protocol         Patient scheduled

## 2021-04-30 ENCOUNTER — OFFICE VISIT (OUTPATIENT)
Dept: FAMILY MEDICINE CLINIC | Facility: CLINIC | Age: 56
End: 2021-04-30
Payer: COMMERCIAL

## 2021-04-30 VITALS
RESPIRATION RATE: 16 BRPM | TEMPERATURE: 97 F | HEART RATE: 97 BPM | OXYGEN SATURATION: 97 % | BODY MASS INDEX: 30.49 KG/M2 | DIASTOLIC BLOOD PRESSURE: 86 MMHG | SYSTOLIC BLOOD PRESSURE: 138 MMHG | WEIGHT: 218.6 LBS

## 2021-04-30 DIAGNOSIS — Z13.6 SCREENING FOR CARDIOVASCULAR CONDITION: ICD-10-CM

## 2021-04-30 DIAGNOSIS — G89.29 CHRONIC RIGHT-SIDED LOW BACK PAIN WITH RIGHT-SIDED SCIATICA: ICD-10-CM

## 2021-04-30 DIAGNOSIS — Z13.1 SCREENING FOR DIABETES MELLITUS: ICD-10-CM

## 2021-04-30 DIAGNOSIS — Z12.5 SCREENING FOR PROSTATE CANCER: ICD-10-CM

## 2021-04-30 DIAGNOSIS — M54.41 CHRONIC RIGHT-SIDED LOW BACK PAIN WITH RIGHT-SIDED SCIATICA: ICD-10-CM

## 2021-04-30 DIAGNOSIS — I10 ESSENTIAL HYPERTENSION: Primary | ICD-10-CM

## 2021-04-30 PROCEDURE — 99214 OFFICE O/P EST MOD 30 MIN: CPT | Performed by: FAMILY MEDICINE

## 2021-04-30 NOTE — PROGRESS NOTES
FAMILY PRACTICE OFFICE VISIT       NAME: Jody Ibarra  AGE: 54 y o  SEX: male       : 1965        MRN: [id number]    DATE: 2021  TIME: 2:27 PM    Assessment and Plan   1  Essential hypertension  Comments:  Pt stable; BP acceptable, but could be better - pt to work on a lower carb/salt diet, regular exercise  FBW ordered  Pt to try to stop smoking  2  Chronic right-sided low back pain with right-sided sciatica  Comments:  Ongoing for pt - disc getting xrays, referral to PT  Pt desires MRI - informed him that his insurance is likely to decline, w/o doing other first; pt wants MRI  Orders:  -     MRI lumbar spine wo contrast; Future; Expected date: 2021    3  Screening for diabetes mellitus  -     Comprehensive metabolic panel; Future    4  Screening for cardiovascular condition  -     Lipid Panel with Direct LDL reflex; Future    5  Screening for prostate cancer  Comments:  PSA incl with FBW ordered  Pt declined GIOVANNI/prostatic exam at last [facility] Exam   Orders:  -     PSA, Total Screen; Future; Expected date: 2021         There are no Patient Instructions on file for this visit  Chief Complaint     Chief Complaint   Patient presents with    Follow-up       History of Present Illness   Jody Ibarra is a 54y o -year-old male who presents in f/u today for the first time since 2019  Still smoking - we discussed; his insurance had declined at the time ordered  Pt with ongoing low back pain - remote hx of several lumbar ESIs in the past   He is not interested in being set up for the COV-19 vaccine at this time  Review of Systems   Review of Systems   Constitutional: Negative for activity change  Respiratory: Negative for shortness of breath  Cardiovascular: Negative for chest pain  Musculoskeletal: Positive for back pain  Neurological:        Pain to the outer right upper leg  No fecal / urinary incontinence           Active Problem List     Patient Active Problem List   Diagnosis    Essential hypertension    Insect bite of lower leg with local reaction, right, initial encounter         Past Medical History:  Past Medical History:   Diagnosis Date    Hypertension        Past Surgical History:  Past Surgical History:   Procedure Laterality Date    KNEE SURGERY Bilateral        Family History:  Family History   Problem Relation Age of Onset    No Known Problems Mother     No Known Problems Father        Social History:  Social History     Socioeconomic History    Marital status: /Civil Union     Spouse name: Not on file    Number of children: Not on file    Years of education: Not on file    Highest education level: Not on file   Occupational History    Not on file   Social Needs    Financial resource strain: Not on file    Food insecurity     Worry: Not on file     Inability: Not on file   [employer] needs     Medical: Not on file     Non-medical: Not on file   Tobacco Use    Smoking status: Current Every Day Smoker     Packs/day: 0 50     Years: 32 00     Pack years: 16 00     Types: Cigarettes    Smokeless tobacco: Never Used    Tobacco comment: 1 PPD x 15 years - As per [place]    Substance and Sexual Activity    Alcohol use:  Yes     Alcohol/week: 1 0 standard drinks     Types: 1 Cans of beer per week     Frequency: Monthly or less     Drinks per session: 1 or 2     Binge frequency: Less than monthly     Comment: rare social use    Drug use: Never    Sexual activity: Not on file   Lifestyle    Physical activity     Days per week: Not on file     Minutes per session: Not on file    Stress: Not on file   Relationships    Social connections     Talks on phone: Not on file     Gets together: Not on file     Attends [employer] service: Not on file     Active member of club or organization: Not on file     Attends meetings of clubs or organizations: Not on file     Relationship status: Not on file    Intimate partner violence     Fear of current or ex partner: Not on file     Emotionally abused: Not on file     Physically abused: Not on file     Forced sexual activity: Not on file   Other Topics Concern    Not on file   Social History Narrative    Current some day smoker - As per [employer]         · Most recent tobacco use screening:   10-      · Do you currently or have you served in the [employer]:   No     As per [employer]        Objective     Vitals:    04/30/21 1324   BP: 138/86   Pulse:    Resp:    Temp:    SpO2:      Wt Readings from Last 3 Encounters:   04/30/21 99 2 kg (218 lb 9 6 oz)   10/11/19 98 9 kg (218 lb)   08/23/19 98 9 kg (218 lb)       Physical Exam  Vitals signs and nursing note reviewed  Constitutional:       General: He is not in acute distress  Appearance: Normal appearance  He is not ill-appearing, toxic-appearing or diaphoretic  HENT:      Head: Normocephalic and atraumatic  Eyes:      General: No scleral icterus  Conjunctiva/sclera: Conjunctivae normal    Neck:      Musculoskeletal: Normal range of motion and neck supple  No neck rigidity or muscular tenderness  Cardiovascular:      Rate and Rhythm: Normal rate and regular rhythm  Heart sounds: Normal heart sounds  No murmur  No friction rub  No gallop  Pulmonary:      Effort: Pulmonary effort is normal  No respiratory distress  Breath sounds: Normal breath sounds  No stridor  No wheezing, rhonchi or rales  Musculoskeletal:        Back:    Lymphadenopathy:      Cervical: No cervical adenopathy  Neurological:      Mental Status: He is alert and oriented to person, place, and time  Psychiatric:         Mood and Affect: Mood normal          Behavior: Behavior normal          Thought Content:  Thought content normal          Judgment: Judgment normal          Pertinent Laboratory/Diagnostic Studies:  Lab Results   Component Value Date    BUN 12 06/01/2019    CREATININE 0 87 06/01/2019    CALCIUM 9 2 06/01/2019    K 4 5 06/01/2019 CO2 23 06/01/2019     06/01/2019     Lab Results   Component Value Date    ALT 25 06/01/2019    AST 22 06/01/2019    ALKPHOS 69 06/01/2019       Lab Results   Component Value Date    WBC 6 31 06/01/2019    HGB 17 8 (H) 06/01/2019    HCT 51 3 (H) 06/01/2019    MCV 88 06/01/2019     06/01/2019       No results found for: TSH    No results found for: CHOL  Lab Results   Component Value Date    TRIG 128 06/01/2019     Lab Results   Component Value Date    HDL 34 (L) 06/01/2019     Lab Results   Component Value Date    LDLCALC 119 (H) 06/01/2019     No results found for: HGBA1C    Results for orders placed or performed in visit on 06/01/19   Comprehensive metabolic panel   Result Value Ref Range    Sodium 139 136 - 145 mmol/L    Potassium 4 5 3 5 - 5 3 mmol/L    Chloride 105 100 - 108 mmol/L    CO2 23 21 - 32 mmol/L    ANION GAP 11 4 - 13 mmol/L    BUN 12 5 - 25 mg/dL    Creatinine 0 87 0 60 - 1 30 mg/dL    Glucose, Fasting 90 65 - 99 mg/dL    Calcium 9 2 8 3 - 10 1 mg/dL    AST 22 5 - 45 U/L    ALT 25 12 - 78 U/L    Alkaline Phosphatase 69 46 - 116 U/L    Total Protein 7 0 6 4 - 8 2 g/dL    Albumin 3 6 3 5 - 5 0 g/dL    Total Bilirubin 0 50 0 20 - 1 00 mg/dL    eGFR 99 ml/min/1 73sq m   Lipid Panel with Direct LDL reflex   Result Value Ref Range    Cholesterol 179 50 - 200 mg/dL    Triglycerides 128 <=150 mg/dL    HDL, Direct 34 (L) 40 - 60 mg/dL    LDL Calculated 119 (H) 0 - 100 mg/dL   CBC and differential   Result Value Ref Range    WBC 6 31 4 31 - 10 16 Thousand/uL    RBC 5 86 (H) 3 88 - 5 62 Million/uL    Hemoglobin 17 8 (H) 12 0 - 17 0 g/dL    Hematocrit 51 3 (H) 36 5 - 49 3 %    MCV 88 82 - 98 fL    MCH 30 4 26 8 - 34 3 pg    MCHC 34 7 31 4 - 37 4 g/dL    RDW 13 1 11 6 - 15 1 %    MPV 9 7 8 9 - 12 7 fL    Platelets 504 202 - 286 Thousands/uL    nRBC 0 /100 WBCs    Neutrophils Relative 65 43 - 75 %    Immat GRANS % 1 0 - 2 %    Lymphocytes Relative 25 14 - 44 %    Monocytes Relative 6 4 - 12 % Eosinophils Relative 2 0 - 6 %    Basophils Relative 1 0 - 1 %    Neutrophils Absolute 4 13 1 85 - 7 62 Thousands/µL    Immature Grans Absolute 0 03 0 00 - 0 20 Thousand/uL    Lymphocytes Absolute 1 56 0 60 - 4 47 Thousands/µL    Monocytes Absolute 0 37 0 17 - 1 22 Thousand/µL    Eosinophils Absolute 0 14 0 00 - 0 61 Thousand/µL    Basophils Absolute 0 08 0 00 - 0 10 Thousands/µL   PSA, Total Screen   Result Value Ref Range    PSA 0 4 0 0 - 4 0 ng/mL   Lyme Antibody Profile with reflex to WB   Result Value Ref Range    LYME AB IGG 0 13 0 00 - 0 79    LYME AB IGM 0 20 0 00 - 0 79   [facility] spotted fever igm   Result Value Ref Range    RMSF IgM 0 30 0 00 - 0 89 index   Sedimentation rate, automated   Result Value Ref Range    Sed Rate 1 0 - 10 mm/hour   C-reactive protein   Result Value Ref Range    CRP 6 1 (H) <3 0 mg/L   MICHAEL Screen w/ Reflex to Titer/Pattern   Result Value Ref Range    MICHAEL Negative Negative   RF Screen w/ Reflex to Titer   Result Value Ref Range    Rheumatoid Factor Negative Negative       Orders Placed This Encounter   Procedures    MRI lumbar spine wo contrast    Comprehensive metabolic panel    Lipid Panel with Direct LDL reflex    PSA, Total Screen       ALLERGIES:  No Known Allergies    Current Medications     Current Outpatient Medications   Medication Sig Dispense Refill    losartan (COZAAR) 100 MG tablet TAKE 1 TABLET BY MOUTH EVERY DAY 90 tablet 1    predniSONE 10 mg tablet Six tablets day 1; 5 tablets day to; 4 tablets day 3; 3 tablets day 4; 2 tablets day 5; and 1 tablet day 6 (Patient not taking: Reported on 10/11/2019) 21 tablet 0    varenicline (CHANTIX BRANDON) 0 5 MG X 11 & 1 MG X 42 tablet Take one 0 5mg tab by mouth 1x daily for 3 days, then increase to one 0 5mg tab 2x daily for 3 days, then increase to one 1mg tab 2x daily (Patient not taking: Reported on 10/11/2019) 53 tablet 0     No current facility-administered medications for this visit            [facility] Maintenance Health Maintenance   Topic Date Due    COVID-19 Vaccine (1) Never done    Colorectal Cancer Screening  Never done    Annual Physical  08/23/2020    Influenza Vaccine (1) 06/30/2021 (Originally 9/1/2020)    Pneumococcal Vaccine: Pediatrics (0 to 5 Years) and At-Risk Patients (6 to 59 Years) (1 of 1 - PPSV23) 04/30/2022 (Originally 9/16/1971)    DTaP,Tdap,and Td Vaccines (1 - Tdap) 04/30/2022 (Originally 9/16/1986)    HIV Screening  05/01/2023 (Originally 9/16/1980)    Hepatitis C Screening  05/30/2023 (Originally 1965)    Depression Screening PHQ  04/30/2022    BMI: Adult  04/30/2022    HIB Vaccine  Aged Out    Hepatitis B Vaccine  Aged Out    IPV Vaccine  Aged Out    Hepatitis A Vaccine  Aged Out    Meningococcal ACWY Vaccine  Aged Out    HPV Vaccine  Aged Out       There is no immunization history on file for this patient         [address] Lula Srinivasan DO

## 2021-07-30 ENCOUNTER — OFFICE VISIT (OUTPATIENT)
Dept: FAMILY MEDICINE CLINIC | Facility: CLINIC | Age: 56
End: 2021-07-30
Payer: COMMERCIAL

## 2021-07-30 VITALS
SYSTOLIC BLOOD PRESSURE: 138 MMHG | BODY MASS INDEX: 30.32 KG/M2 | OXYGEN SATURATION: 98 % | HEIGHT: 71 IN | DIASTOLIC BLOOD PRESSURE: 84 MMHG | HEART RATE: 64 BPM | WEIGHT: 216.6 LBS

## 2021-07-30 DIAGNOSIS — Z12.5 SCREENING FOR PROSTATE CANCER: ICD-10-CM

## 2021-07-30 DIAGNOSIS — Z13.6 SCREENING FOR CARDIOVASCULAR CONDITION: ICD-10-CM

## 2021-07-30 DIAGNOSIS — Z12.11 SCREENING FOR COLORECTAL CANCER: ICD-10-CM

## 2021-07-30 DIAGNOSIS — Z13.1 SCREENING FOR DIABETES MELLITUS: ICD-10-CM

## 2021-07-30 DIAGNOSIS — Z12.12 SCREENING FOR COLORECTAL CANCER: ICD-10-CM

## 2021-07-30 DIAGNOSIS — Z12.2 ENCOUNTER FOR SCREENING FOR LUNG CANCER: ICD-10-CM

## 2021-07-30 DIAGNOSIS — I10 ESSENTIAL HYPERTENSION: ICD-10-CM

## 2021-07-30 DIAGNOSIS — Z00.00 ANNUAL PHYSICAL EXAM: Primary | ICD-10-CM

## 2021-07-30 PROCEDURE — 99396 PREV VISIT EST AGE 40-64: CPT | Performed by: FAMILY MEDICINE

## 2021-07-30 NOTE — PROGRESS NOTES
[facility] PRACTICE    NAME: Maria Fernandez  AGE: 54 y o  SEX: male  : 1965     DATE: 2021     Assessment and Plan:     Problem List Items Addressed This Visit        Cardiovascular and Mediastinum    Essential hypertension      Other Visit Diagnoses     Annual physical exam    -  Primary    Pt stable  Tala Pro is to continue a lower carb/salt diet, & get exercise  FBW again ordered  Pt urged to stop smoking - disc options (pt wants to try Nicotine Gum)  Screening for colorectal cancer        Pt again referred to GI  Relevant Orders    Ambulatory referral to Gastroenterology    Encounter for screening for lung cancer        Discussed - will order for pt LDCT Chest     Relevant Orders    CT lung screening program    Screening for prostate cancer        Pt declined GIOVANNI/prostatic exam today  PSA incl with FBW ordered  Relevant Orders    PSA, Total Screen    Screening for diabetes mellitus        Relevant Orders    Comprehensive metabolic panel    Screening for cardiovascular condition        Relevant Orders    Lipid Panel with Direct LDL reflex    BMI 30 0-30 9,adult        Diet and exercise as above  Immunizations and preventive care screenings were discussed with patient today  Appropriate education was printed on patient's after visit summary  Counseling:  Dental Health: discussed importance of regular tooth brushing, flossing, and dental visits  · Exercise: the importance of regular exercise/physical activity was discussed  Recommend exercise 3-5 times per week for at least 30 minutes  BMI Counseling: Body mass index is 30 04 kg/m²  The BMI is above normal  Nutrition recommendations include moderation in carbohydrate intake  Exercise recommendations include exercising 3-5 times per week  No pharmacotherapy was ordered  Patient referred to PCP due to patient being overweight             Return in 6 months (on 1/30/2022) for Recheck  Chief Complaint:     No chief complaint on file  History of Present Illness:     Adult Annual Physical   Patient here for a comprehensive physical exam  The patient reports no problems  Discussed with the pt today the importance of the available COV-19 vaccines  Pt never completed a previously ordered lumbar MRI or FBW  Still smoking - disc his need to quit  Diet and Physical Activity  · Diet/Nutrition: limited junk food  · Exercise: no formal exercise  Depression Screening  PHQ-9 Depression Screening    PHQ-9:   Frequency of the following problems over the past two weeks:           General Health  · Sleep: sleeps well  · Hearing: normal - bilateral   · Vision: no vision problems  · Dental: regular dental visits   Health  · Symptoms include: none     Review of Systems:     Review of Systems   Constitutional: Negative for activity change  Respiratory: Negative for shortness of breath  Cardiovascular: Negative for chest pain  Gastrointestinal: Negative for abdominal pain and blood in stool  Genitourinary: Negative for decreased urine volume and difficulty urinating  Psychiatric/Behavioral: Negative for dysphoric mood  The patient is not nervous/anxious  Some stresses with large crowds        Past Medical History:     Past Medical History:   Diagnosis Date    Hypertension       Past Surgical History:     Past Surgical History:   Procedure Laterality Date    KNEE SURGERY Bilateral       Family History:     Family History   Problem Relation Age of Onset    Hypertension Mother     No Known Problems Father       Social History:     Social History     Socioeconomic History    Marital status: /Civil Union     Spouse name: None    Number of children: None    Years of education: None    Highest education level: None   Occupational History    None   Tobacco Use    Smoking status: Current Every Day Smoker     Packs/day: 0 50     Years: 32 00     Pack years: 16 00     Types: Cigarettes    Smokeless tobacco: Never Used    Tobacco comment: 1 PPD x 15 years - As per [employer] Vaping Use: Never used   Substance and Sexual Activity    Alcohol use: Yes     Comment: rare social use one time per month    Drug use: Never    Sexual activity: Yes   Other Topics Concern    None   Social History Narrative    Current some day smoker - As per [employer]         · Most recent tobacco use screening:   10-      · Do you currently or have you served in the [employer]:   No     As per [employer]      Social Determinants of Health     Financial Resource Strain:     Difficulty of Paying Living Expenses:    Food Insecurity:     Worried About Running Out of Food in the Last Year:     [address] in the Last Year:    Transportation Needs:     Lack of Transportation (Medical):      Lack of Transportation (Non-Medical):    Physical Activity:     Days of Exercise per Week:     Minutes of Exercise per Session:    Stress:     Feeling of Stress :    Social Connections:     Frequency of Communication with Friends and Family:     Frequency of Social Gatherings with Friends and Family:     Attends [employer] Services:     Active Member of Clubs or Organizations:     Attends Club or Organization Meetings:     Marital Status:    Intimate Partner Violence:     Fear of Current or Ex-Partner:     Emotionally Abused:     Physically Abused:     Sexually Abused:       Current Medications:     Current Outpatient Medications   Medication Sig Dispense Refill    losartan (COZAAR) 100 MG tablet TAKE 1 TABLET BY MOUTH EVERY DAY 90 tablet 1    predniSONE 10 mg tablet Six tablets day 1; 5 tablets day to; 4 tablets day 3; 3 tablets day 4; 2 tablets day 5; and 1 tablet day 6 (Patient not taking: Reported on 10/11/2019) 21 tablet 0    varenicline (CHANTIX BRANDON) 0 5 MG X 11 & 1 MG X 42 tablet Take one 0 5mg tab by mouth 1x daily for 3 days, then increase to one 0 5mg tab 2x daily for 3 days, then increase to one 1mg tab 2x daily (Patient not taking: Reported on 10/11/2019) 53 tablet 0     No current facility-administered medications for this visit  Allergies:     No Known Allergies   Physical Exam:     /84   Pulse 64   Ht 5' 11 2" (1 808 m)   Wt 98 2 kg (216 lb 9 6 oz)   SpO2 98%   BMI 30 04 kg/m²     Physical Exam  Vitals and nursing note reviewed  Constitutional:       General: He is not in acute distress  Appearance: Normal appearance  He is not ill-appearing, toxic-appearing or diaphoretic  HENT:      Head: Normocephalic and atraumatic  Eyes:      General: No scleral icterus  Conjunctiva/sclera: Conjunctivae normal    Neck:      Vascular: No JVD  Cardiovascular:      Rate and Rhythm: Normal rate and regular rhythm  Heart sounds: Normal heart sounds  No murmur heard  No friction rub  No gallop  Pulmonary:      Effort: Pulmonary effort is normal  No respiratory distress  Breath sounds: Normal breath sounds  No stridor  No wheezing, rhonchi or rales  Abdominal:      General: Abdomen is flat  Bowel sounds are normal  There is no distension  Palpations: Abdomen is soft  There is no mass  Tenderness: There is no abdominal tenderness  There is no guarding or rebound  Musculoskeletal:      Cervical back: Normal range of motion and neck supple  No rigidity or tenderness  Lymphadenopathy:      Cervical: No cervical adenopathy  Neurological:      Mental Status: He is alert and oriented to person, place, and time  Psychiatric:         Mood and Affect: Mood normal  Affect is blunt  Speech: Speech normal          Behavior: Behavior normal          Thought Content: Thought content normal          Judgment: Judgment normal           Diagnoses and all orders for this visit:    Annual physical exam  Comments:  Pt stable  Tala Pro is to continue a lower carb/salt diet, & get exercise  FBW again ordered  Pt urged to stop smoking - disc options (pt wants to try Nicotine Gum)  Screening for colorectal cancer  Comments:  Pt again referred to GI  Orders:  -     Cancel: Ambulatory referral to Gastroenterology; Future  -     Ambulatory referral to Gastroenterology; Future    Encounter for screening for lung cancer  Comments:  Discussed - will order for pt LDCT Chest   Orders:  -     CT lung screening program; Future    Screening for prostate cancer  Comments:  Pt declined GIOVANNI/prostatic exam today  PSA incl with FBW ordered  Orders:  -     PSA, Total Screen; Future    Screening for diabetes mellitus  -     Comprehensive metabolic panel; Future    Screening for cardiovascular condition  -     Lipid Panel with Direct LDL reflex; Future    BMI 30 0-30 9,adult  Comments:  Diet and exercise as above  Essential hypertension  Comments:  Controlled on present management          Carl[address] Cardenas, [address]

## 2021-07-30 NOTE — PATIENT INSTRUCTIONS

## 2021-08-24 LAB
ALBUMIN SERPL-MCNC: 4.2 G/DL (ref 3.6–5.1)
ALBUMIN/GLOB SERPL: 1.8 (CALC) (ref 1–2.5)
ALP SERPL-CCNC: 57 U/L (ref 35–144)
ALT SERPL-CCNC: 16 U/L (ref 9–46)
AST SERPL-CCNC: 21 U/L (ref 10–35)
BILIRUB SERPL-MCNC: 0.5 MG/DL (ref 0.2–1.2)
BUN SERPL-MCNC: 16 MG/DL (ref 7–25)
BUN/CREAT SERPL: NORMAL (CALC) (ref 6–22)
CALCIUM SERPL-MCNC: 9.2 MG/DL (ref 8.6–10.3)
CHLORIDE SERPL-SCNC: 110 MMOL/L (ref 98–110)
CHOLEST SERPL-MCNC: 218 MG/DL
CHOLEST/HDLC SERPL: 5.1 (CALC)
CO2 SERPL-SCNC: 22 MMOL/L (ref 20–32)
CREAT SERPL-MCNC: 0.92 MG/DL (ref 0.7–1.33)
GLOBULIN SER CALC-MCNC: 2.3 G/DL (CALC) (ref 1.9–3.7)
GLUCOSE SERPL-MCNC: 94 MG/DL (ref 65–99)
HDLC SERPL-MCNC: 43 MG/DL
LDLC SERPL CALC-MCNC: 157 MG/DL (CALC)
NONHDLC SERPL-MCNC: 175 MG/DL (CALC)
POTASSIUM SERPL-SCNC: 4.5 MMOL/L (ref 3.5–5.3)
PROT SERPL-MCNC: 6.5 G/DL (ref 6.1–8.1)
PSA SERPL-MCNC: 0.3 NG/ML
SL AMB EGFR AFRICAN AMERICAN: 108 ML/MIN/1.73M2
SL AMB EGFR NON AFRICAN AMERICAN: 93 ML/MIN/1.73M2
SODIUM SERPL-SCNC: 140 MMOL/L (ref 135–146)
TRIGL SERPL-MCNC: 81 MG/DL

## 2021-09-16 ENCOUNTER — TELEPHONE (OUTPATIENT)
Dept: FAMILY MEDICINE CLINIC | Facility: CLINIC | Age: 56
End: 2021-09-16

## 2021-09-16 PROCEDURE — U0003 INFECTIOUS AGENT DETECTION BY NUCLEIC ACID (DNA OR RNA); SEVERE ACUTE RESPIRATORY SYNDROME CORONAVIRUS 2 (SARS-COV-2) (CORONAVIRUS DISEASE [COVID-19]), AMPLIFIED PROBE TECHNIQUE, MAKING USE OF HIGH THROUGHPUT TECHNOLOGIES AS DESCRIBED BY CMS-2020-01-R: HCPCS | Performed by: FAMILY MEDICINE

## 2021-09-16 PROCEDURE — U0005 INFEC AGEN DETEC AMPLI PROBE: HCPCS | Performed by: FAMILY MEDICINE

## 2021-10-05 ENCOUNTER — PREP FOR PROCEDURE (OUTPATIENT)
Dept: GASTROENTEROLOGY | Facility: AMBULARY SURGERY CENTER | Age: 56
End: 2021-10-05

## 2021-10-05 ENCOUNTER — TELEPHONE (OUTPATIENT)
Dept: GASTROENTEROLOGY | Facility: AMBULARY SURGERY CENTER | Age: 56
End: 2021-10-05

## 2021-10-05 DIAGNOSIS — Z12.11 SPECIAL SCREENING FOR MALIGNANT NEOPLASMS, COLON: Primary | ICD-10-CM

## 2021-10-12 ENCOUNTER — IMMUNIZATIONS (OUTPATIENT)
Dept: FAMILY MEDICINE CLINIC | Facility: HOSPITAL | Age: 56
End: 2021-10-12

## 2021-10-12 DIAGNOSIS — Z23 ENCOUNTER FOR IMMUNIZATION: Primary | ICD-10-CM

## 2021-10-12 PROCEDURE — 0011A SARS-COV-2 / COVID-19 MRNA VACCINE (MODERNA) 100 MCG: CPT

## 2021-10-12 PROCEDURE — 91301 SARS-COV-2 / COVID-19 MRNA VACCINE (MODERNA) 100 MCG: CPT

## 2021-11-01 ENCOUNTER — TELEPHONE (OUTPATIENT)
Dept: FAMILY MEDICINE CLINIC | Facility: CLINIC | Age: 56
End: 2021-11-01

## 2021-11-01 PROCEDURE — U0003 INFECTIOUS AGENT DETECTION BY NUCLEIC ACID (DNA OR RNA); SEVERE ACUTE RESPIRATORY SYNDROME CORONAVIRUS 2 (SARS-COV-2) (CORONAVIRUS DISEASE [COVID-19]), AMPLIFIED PROBE TECHNIQUE, MAKING USE OF HIGH THROUGHPUT TECHNOLOGIES AS DESCRIBED BY CMS-2020-01-R: HCPCS | Performed by: FAMILY MEDICINE

## 2021-11-01 PROCEDURE — U0005 INFEC AGEN DETEC AMPLI PROBE: HCPCS | Performed by: FAMILY MEDICINE

## 2021-11-04 DIAGNOSIS — Z12.11 SCREENING FOR COLON CANCER: Primary | ICD-10-CM

## 2021-11-04 RX ORDER — POLYETHYLENE GLYCOL 3350 17 G/17G
238 POWDER, FOR SOLUTION ORAL ONCE
Qty: 238 G | Refills: 0 | Status: SHIPPED | OUTPATIENT
Start: 2021-11-04 | End: 2021-11-06 | Stop reason: ALTCHOICE

## 2021-11-05 ENCOUNTER — TELEPHONE (OUTPATIENT)
Dept: GASTROENTEROLOGY | Facility: HOSPITAL | Age: 56
End: 2021-11-05

## 2021-11-05 ENCOUNTER — TELEPHONE (OUTPATIENT)
Dept: GASTROENTEROLOGY | Facility: AMBULARY SURGERY CENTER | Age: 56
End: 2021-11-05

## 2021-11-06 ENCOUNTER — ANESTHESIA (OUTPATIENT)
Dept: GASTROENTEROLOGY | Facility: HOSPITAL | Age: 56
End: 2021-11-06

## 2021-11-06 ENCOUNTER — ANESTHESIA EVENT (OUTPATIENT)
Dept: GASTROENTEROLOGY | Facility: HOSPITAL | Age: 56
End: 2021-11-06

## 2021-11-06 ENCOUNTER — HOSPITAL ENCOUNTER (OUTPATIENT)
Dept: GASTROENTEROLOGY | Facility: HOSPITAL | Age: 56
Setting detail: OUTPATIENT SURGERY
Discharge: HOME | End: 2021-11-06
Attending: INTERNAL MEDICINE | Admitting: INTERNAL MEDICINE
Payer: COMMERCIAL

## 2021-11-06 VITALS
SYSTOLIC BLOOD PRESSURE: 118 MMHG | HEIGHT: 70 IN | HEART RATE: 55 BPM | WEIGHT: 219 LBS | TEMPERATURE: 97.5 F | DIASTOLIC BLOOD PRESSURE: 82 MMHG | BODY MASS INDEX: 31.35 KG/M2 | OXYGEN SATURATION: 99 % | RESPIRATION RATE: 17 BRPM

## 2021-11-06 DIAGNOSIS — Z12.11 SPECIAL SCREENING FOR MALIGNANT NEOPLASMS, COLON: ICD-10-CM

## 2021-11-06 PROCEDURE — 45385 COLONOSCOPY W/LESION REMOVAL: CPT | Performed by: INTERNAL MEDICINE

## 2021-11-06 PROCEDURE — 45380 COLONOSCOPY AND BIOPSY: CPT | Performed by: INTERNAL MEDICINE

## 2021-11-06 PROCEDURE — 88305 TISSUE EXAM BY PATHOLOGIST: CPT | Performed by: PATHOLOGY

## 2021-11-06 RX ORDER — PROPOFOL 10 MG/ML
INJECTION, EMULSION INTRAVENOUS CONTINUOUS PRN
Status: DISCONTINUED | OUTPATIENT
Start: 2021-11-06 | End: 2021-11-06

## 2021-11-06 RX ORDER — PROPOFOL 10 MG/ML
INJECTION, EMULSION INTRAVENOUS AS NEEDED
Status: DISCONTINUED | OUTPATIENT
Start: 2021-11-06 | End: 2021-11-06

## 2021-11-09 ENCOUNTER — IMMUNIZATIONS (OUTPATIENT)
Dept: FAMILY MEDICINE CLINIC | Facility: HOSPITAL | Age: 56
End: 2021-11-09

## 2021-11-09 DIAGNOSIS — Z23 ENCOUNTER FOR IMMUNIZATION: Primary | ICD-10-CM

## 2021-11-09 PROCEDURE — 91301 COVID-19 MODERNA VACC 0.5 ML: CPT

## 2021-11-09 PROCEDURE — 0012A COVID-19 MODERNA VACC 0.5 ML: CPT

## 2021-11-13 DIAGNOSIS — I10 ESSENTIAL HYPERTENSION: ICD-10-CM

## 2021-11-15 RX ORDER — LOSARTAN POTASSIUM 100 MG/1
100 TABLET ORAL DAILY
Qty: 90 TABLET | Refills: 0 | Status: SHIPPED | OUTPATIENT
Start: 2021-11-15 | End: 2022-03-23 | Stop reason: SDUPTHER

## 2021-12-30 ENCOUNTER — TELEPHONE (OUTPATIENT)
Dept: FAMILY MEDICINE CLINIC | Facility: CLINIC | Age: 56
End: 2021-12-30

## 2021-12-30 DIAGNOSIS — Z20.822 EXPOSURE TO COVID-19 VIRUS: Primary | ICD-10-CM

## 2021-12-30 PROCEDURE — U0003 INFECTIOUS AGENT DETECTION BY NUCLEIC ACID (DNA OR RNA); SEVERE ACUTE RESPIRATORY SYNDROME CORONAVIRUS 2 (SARS-COV-2) (CORONAVIRUS DISEASE [COVID-19]), AMPLIFIED PROBE TECHNIQUE, MAKING USE OF HIGH THROUGHPUT TECHNOLOGIES AS DESCRIBED BY CMS-2020-01-R: HCPCS | Performed by: FAMILY MEDICINE

## 2021-12-30 PROCEDURE — U0005 INFEC AGEN DETEC AMPLI PROBE: HCPCS | Performed by: FAMILY MEDICINE

## 2022-01-03 ENCOUNTER — TELEPHONE (OUTPATIENT)
Dept: FAMILY MEDICINE CLINIC | Facility: CLINIC | Age: 57
End: 2022-01-03

## 2022-01-03 DIAGNOSIS — Z20.822 EXPOSURE TO COVID-19 VIRUS: Primary | ICD-10-CM

## 2022-01-03 NOTE — TELEPHONE ENCOUNTER
Patients significant other called and stated that he was exposed to her daughter on Friday, he currently has no symptoms and he boss needs him to have another test done so he can return to work

## 2022-01-05 PROCEDURE — U0003 INFECTIOUS AGENT DETECTION BY NUCLEIC ACID (DNA OR RNA); SEVERE ACUTE RESPIRATORY SYNDROME CORONAVIRUS 2 (SARS-COV-2) (CORONAVIRUS DISEASE [COVID-19]), AMPLIFIED PROBE TECHNIQUE, MAKING USE OF HIGH THROUGHPUT TECHNOLOGIES AS DESCRIBED BY CMS-2020-01-R: HCPCS | Performed by: FAMILY MEDICINE

## 2022-01-05 PROCEDURE — U0005 INFEC AGEN DETEC AMPLI PROBE: HCPCS | Performed by: FAMILY MEDICINE

## 2022-03-23 DIAGNOSIS — I10 ESSENTIAL HYPERTENSION: ICD-10-CM

## 2022-03-23 RX ORDER — LOSARTAN POTASSIUM 100 MG/1
100 TABLET ORAL DAILY
Qty: 90 TABLET | Refills: 1 | Status: SHIPPED | OUTPATIENT
Start: 2022-03-23

## 2022-04-05 ENCOUNTER — OFFICE VISIT (OUTPATIENT)
Dept: FAMILY MEDICINE CLINIC | Facility: CLINIC | Age: 57
End: 2022-04-05
Payer: COMMERCIAL

## 2022-04-05 VITALS
RESPIRATION RATE: 12 BRPM | OXYGEN SATURATION: 97 % | TEMPERATURE: 98.3 F | HEIGHT: 70 IN | HEART RATE: 86 BPM | DIASTOLIC BLOOD PRESSURE: 86 MMHG | BODY MASS INDEX: 31.67 KG/M2 | WEIGHT: 221.2 LBS | SYSTOLIC BLOOD PRESSURE: 124 MMHG

## 2022-04-05 DIAGNOSIS — Z72.0 TOBACCO ABUSE: ICD-10-CM

## 2022-04-05 DIAGNOSIS — Z13.1 SCREENING FOR DIABETES MELLITUS: ICD-10-CM

## 2022-04-05 DIAGNOSIS — E78.2 MIXED HYPERLIPIDEMIA: ICD-10-CM

## 2022-04-05 DIAGNOSIS — Z12.5 SCREENING FOR PROSTATE CANCER: ICD-10-CM

## 2022-04-05 DIAGNOSIS — I10 ESSENTIAL HYPERTENSION: Primary | ICD-10-CM

## 2022-04-05 PROCEDURE — 99213 OFFICE O/P EST LOW 20 MIN: CPT | Performed by: FAMILY MEDICINE

## 2022-04-05 NOTE — PROGRESS NOTES
FAMILY PRACTICE OFFICE VISIT       NAME: Earnestine Adams  AGE: 64 y o  SEX: male       : 1965        MRN: [id number]    DATE: 2022  TIME: 2:01 PM    Assessment and Plan   1  Essential hypertension  Comments:  Reasonably controlled on present management  Pt to continue a lower carb/salt diet, & regular exercise  2  Mixed hyperlipidemia  Comments:  Hx of - FBW ordered for the pt, to be done prior to next OV  Orders:  -     Lipid Panel with Direct LDL reflex; Future    3  Screening for diabetes mellitus  -     Comprehensive metabolic panel; Future    4  Screening for prostate cancer  Comments:  PSA incl with FBW ordered  Orders:  -     PSA, Total Screen; Future; Expected date: 2022    5  Tobacco abuse  Comments:  Pt urged to quit  He is considering using Nicotine pouches  There are no Patient Instructions on file for this visit  Chief Complaint     Chief Complaint   Patient presents with    Follow-up     patient being seen for follow up        History of Present Illness   Earnestine Adams is a 64y o -year-old male who presents today in f/u - I last saw Jarret Farley in 2021  Labs then showed an LDL of 157, PSA 0 3, and Gluc 94  Reports down to 5 cigarettes per day (used to smoke over 1 ppd); thinking about using Nicotine patches  Jarret Farley had colonoscopy in 2021 - 2 polyps; due again in   Review of Systems   Review of Systems   Constitutional: Negative for activity change  Respiratory: Negative for shortness of breath  Cardiovascular: Negative for chest pain  Gastrointestinal: Negative for abdominal pain and blood in stool         Active Problem List     Patient Active Problem List   Diagnosis    Essential hypertension    Insect bite of lower leg with local reaction, right, initial encounter         Past Medical History:  Past Medical History:   Diagnosis Date    Hypertension     Sleep apnea        Past Surgical History:  Past Surgical History:   Procedure Laterality Date    KNEE SURGERY Bilateral        Family History:  Family History   Problem Relation Age of Onset    Hypertension Mother     No Known Problems Father        Social History:  Social History     Socioeconomic History    Marital status: /Civil Union     Spouse name: Not on file    Number of children: Not on file    Years of education: Not on file    Highest education level: Not on file   Occupational History    Not on file   Tobacco Use    Smoking status: Current Every Day Smoker     Packs/day: 0 25     Years: 32 00     Pack years: 8 00     Types: Cigarettes    Smokeless tobacco: Never Used    Tobacco comment: 1 PPD x 15 years - As per [employer] Vaping Use: Never used   Substance and Sexual Activity    Alcohol use: Yes     Comment: rare social use one time per month    Drug use: Never    Sexual activity: Yes   Other Topics Concern    Not on file   Social History Narrative    Current some day smoker - As per [employer]         · Most recent tobacco use screening:   10-      · Do you currently or have you served in [employer]:   No     As per [facility]      Social Determinants of Health     Financial Resource Strain: Not on file   Food Insecurity: Not on file   Transportation Needs: Not on file   Physical Activity: Not on file   Stress: Not on file   Social Connections: Not on file   Intimate Partner Violence: Not on file   Housing Stability: Not on file       Objective     Vitals:    04/05/22 1352   BP: 124/86   Pulse:    Resp:    Temp:    SpO2:      Wt Readings from Last 3 Encounters:   04/05/22 100 kg (221 lb 3 2 oz)   11/06/21 99 3 kg (219 lb)   07/30/21 98 2 kg (216 lb 9 6 oz)       Physical Exam  Vitals and nursing note reviewed  Constitutional:       General: He is not in acute distress  Appearance: Normal appearance  He is not ill-appearing, toxic-appearing or diaphoretic  HENT:      Head: Normocephalic and atraumatic     Eyes:      General: No scleral icterus  Conjunctiva/sclera: Conjunctivae normal    Cardiovascular:      Rate and Rhythm: Normal rate and regular rhythm  Heart sounds: Normal heart sounds  No murmur heard  No friction rub  No gallop  Pulmonary:      Effort: Pulmonary effort is normal  No respiratory distress  Breath sounds: Normal breath sounds  No stridor  No wheezing, rhonchi or rales  Musculoskeletal:      Cervical back: Normal range of motion and neck supple  No rigidity or tenderness  Lymphadenopathy:      Cervical: No cervical adenopathy  Neurological:      Mental Status: He is alert and oriented to person, place, and time  Psychiatric:         Mood and Affect: Mood normal          Behavior: Behavior normal          Thought Content:  Thought content normal          Judgment: Judgment normal          Pertinent Laboratory/Diagnostic Studies:  Lab Results   Component Value Date    BUN 16 08/24/2021    CREATININE 0 92 08/24/2021    CALCIUM 9 2 08/24/2021    K 4 5 08/24/2021    CO2 22 08/24/2021     08/24/2021     Lab Results   Component Value Date    ALT 16 08/24/2021    AST 21 08/24/2021    ALKPHOS 57 08/24/2021       Lab Results   Component Value Date    WBC 6 31 06/01/2019    HGB 17 8 (H) 06/01/2019    HCT 51 3 (H) 06/01/2019    MCV 88 06/01/2019     06/01/2019       No results found for: TSH    No results found for: CHOL  Lab Results   Component Value Date    TRIG 81 08/24/2021     Lab Results   Component Value Date    HDL 43 08/24/2021     Lab Results   Component Value Date    LDLCALC 157 (H) 08/24/2021     No results found for: HGBA1C    Results for orders placed or performed in visit on 01/05/22   COVID Only - Collected at [facility]    Specimen: Nose; Nares   Result Value Ref Range    SARS-CoV-2 Negative Negative       Orders Placed This Encounter   Procedures    Comprehensive metabolic panel    Lipid Panel with Direct LDL reflex    PSA, Total Screen       ALLERGIES:  No Known Allergies    Current Medications     Current Outpatient Medications   Medication Sig Dispense Refill    losartan (COZAAR) 100 MG tablet Take 1 tablet (100 mg total) by mouth daily 90 tablet 1     No current facility-administered medications for this visit           Health Maintenance     Health Maintenance   Topic Date Due    Influenza Vaccine (1) Never done    COVID-19 Vaccine (3 - Booster for Moderna series) 04/09/2022    BMI: Followup Plan  07/30/2022    Pneumococcal Vaccine: Pediatrics (0 to 5 Years) and At-Risk Patients (6 to 59 Years) (1 of 2 - PPSV23) 04/30/2022 (Originally 9/16/1971)    DTaP,Tdap,and Td Vaccines (1 - Tdap) 04/30/2022 (Originally 9/16/1986)    HIV Screening  05/01/2023 (Originally 9/16/1980)    Hepatitis C Screening  05/30/2023 (Originally 1965)    Annual Physical  07/30/2022    Depression Screening  04/05/2023    BMI: Adult  04/05/2023    Colorectal Cancer Screening  11/06/2023    HIB Vaccine  Aged Out    Hepatitis B Vaccine  Aged Out    IPV Vaccine  Aged Out    Hepatitis A Vaccine  Aged Out    Meningococcal ACWY Vaccine  Aged Out    HPV Vaccine  Aged Out     Immunization History   Administered Date(s) Administered    COVID-19 MODERNA VACC 0 5 ML IM 10/12/2021, 11/09/2021          Carols Kay DO

## 2022-09-11 DIAGNOSIS — I10 ESSENTIAL HYPERTENSION: ICD-10-CM

## 2022-09-11 RX ORDER — LOSARTAN POTASSIUM 100 MG/1
TABLET ORAL
Qty: 90 TABLET | Refills: 1 | Status: SHIPPED | OUTPATIENT
Start: 2022-09-11

## 2022-11-10 DIAGNOSIS — I10 ESSENTIAL HYPERTENSION: ICD-10-CM

## 2022-11-10 RX ORDER — LOSARTAN POTASSIUM 100 MG/1
100 TABLET ORAL DAILY
Qty: 90 TABLET | Refills: 1 | Status: SHIPPED | OUTPATIENT
Start: 2022-11-10

## 2022-11-10 NOTE — TELEPHONE ENCOUNTER
Medication Refill Request     Name losartan (COZAAR) 100 MG tablet   Dose/Frequency 1 tablet daily  Quantity 90  Verified pharmacy   [x]  Verified ordering Provider   [x]  Does patient have enough for the next 3 days?  Yes [x] No [] -on admission, patient reported recent h/o weakness, poor PO intake and recent mechanical fall. TSH WNL.   -etiology likely 2/2 deconditioning vs underlying malignancy vs chemotherapy side effects   -c/w supportive care, encourage PO   -per PT, plan to dc to [facility]

## 2022-12-09 ENCOUNTER — OFFICE VISIT (OUTPATIENT)
Dept: URGENT CARE | Facility: CLINIC | Age: 57
End: 2022-12-09

## 2022-12-09 VITALS
HEART RATE: 71 BPM | OXYGEN SATURATION: 97 % | WEIGHT: 222.2 LBS | HEIGHT: 70 IN | SYSTOLIC BLOOD PRESSURE: 140 MMHG | BODY MASS INDEX: 31.81 KG/M2 | RESPIRATION RATE: 15 BRPM | TEMPERATURE: 98.2 F | DIASTOLIC BLOOD PRESSURE: 90 MMHG

## 2022-12-09 DIAGNOSIS — J02.0 STREP PHARYNGITIS: Primary | ICD-10-CM

## 2022-12-09 LAB — S PYO AG THROAT QL: POSITIVE

## 2022-12-09 RX ORDER — AMOXICILLIN 500 MG/1
500 CAPSULE ORAL EVERY 12 HOURS SCHEDULED
Qty: 20 CAPSULE | Refills: 0 | Status: SHIPPED | OUTPATIENT
Start: 2022-12-09 | End: 2022-12-19

## 2022-12-09 NOTE — PROGRESS NOTES
[facility]        NAME: Kelsey Bhakta is a 62 y o  male  : 1965    MRN: [id number]  DATE: 2022  TIME: 12:09 PM    Assessment and Plan   Strep pharyngitis [J02 0]  1  Strep pharyngitis  amoxicillin (AMOXIL) 500 mg capsule    POCT rapid strepA            Patient Instructions   Amoxicillin  twice a day for 10 days   Tylenol/Motrin as needed for pain/fever  Throat lozenge   Increase fluid intake   Discard toothbrush 24 hours after starting antibiotic and again after finishing antibiotics  Follow up with your PCP for worsening symptoms    Follow up with PCP in 3-5 days  Proceed to  ER if symptoms worsen  Chief Complaint     Chief Complaint   Patient presents with   • Cold Like Symptoms     Cough and sore throat x 3 days- wife was sick a week ago with congestion  History of Present Illness       Patient is a 62year old male presenting with 3 days of sore throat and dry cough  Denies fever, chills, congestion, ear pain, shortness of breath, or chest pain  No OTC medications  Wife was sick with URI symptoms last week  She was negative for Covid/Flu  He did not test for Covid at home  Review of Systems   Review of Systems   Constitutional: Negative for activity change, chills, fatigue and fever  HENT: Positive for sore throat  Negative for congestion, ear pain, rhinorrhea and sinus pain  Respiratory: Positive for cough  Negative for shortness of breath  Gastrointestinal: Negative for abdominal pain, diarrhea, nausea and vomiting  Musculoskeletal: Negative for myalgias  Skin: Negative for rash  Neurological: Negative for headaches           Current Medications       Current Outpatient Medications:   •  amoxicillin (AMOXIL) 500 mg capsule, Take 1 capsule (500 mg total) by mouth every 12 (twelve) hours for 10 days, Disp: 20 capsule, Rfl: 0  •  losartan (COZAAR) 100 MG tablet, Take 1 tablet (100 mg total) by mouth daily, Disp: 90 tablet, Rfl: 1    Current Allergies     Allergies as of 12/09/2022   • (No Known Allergies)            The following portions of the patient's history were reviewed and updated as appropriate: allergies, current medications, past family history, past medical history, past social history, past surgical history and problem list      Past Medical History:   Diagnosis Date   • Hypertension    • Sleep apnea        Past Surgical History:   Procedure Laterality Date   • KNEE SURGERY Bilateral        Family History   Problem Relation Age of Onset   • Hypertension Mother    • No Known Problems Father          Medications have been verified  Objective   /90   Pulse 71   Temp 98 2 °F (36 8 °C)   Resp 15   Ht 5' 10" (1 778 m)   Wt 101 kg (222 lb 3 2 oz)   SpO2 97%   BMI 31 88 kg/m²      Rapid strep: positive   Physical Exam     Physical Exam  Vitals reviewed  Constitutional:       General: He is awake  He is not in acute distress  Appearance: Normal appearance  He is normal weight  HENT:      Head: Normocephalic  Right Ear: Hearing, tympanic membrane, ear canal and external ear normal       Left Ear: Hearing, tympanic membrane, ear canal and external ear normal       Nose: Nose normal       Mouth/Throat:      Lips: Pink  Mouth: Mucous membranes are moist       Pharynx: Posterior oropharyngeal erythema present  Tonsils: 2+ on the right  2+ on the left  Cardiovascular:      Rate and Rhythm: Normal rate and regular rhythm  Heart sounds: Normal heart sounds, S1 normal and S2 normal    Pulmonary:      Effort: Pulmonary effort is normal       Breath sounds: Normal breath sounds  No decreased breath sounds, wheezing, rhonchi or rales  Skin:     General: Skin is warm and moist    Neurological:      General: No focal deficit present  Mental Status: He is alert and oriented to person, place, and time  Psychiatric:         Behavior: Behavior is cooperative

## 2022-12-09 NOTE — PATIENT INSTRUCTIONS
Amoxicillin  twice a day for 10 days   Tylenol/Motrin as needed for pain/fever  Throat lozenge   Increase fluid intake   Discard toothbrush 24 hours after starting antibiotic and again after finishing antibiotics  Follow up with your PCP for worsening symptoms    Strep Throat   WHAT YOU NEED TO KNOW:   Strep throat is a throat infection caused by bacteria  It is easily spread from person to person  DISCHARGE INSTRUCTIONS:   Call 911 for any of the following: You have trouble breathing  Return to the emergency department if:   You have new symptoms like a bad headache, stiff neck, chest pain, or vomiting  You are drooling because you cannot swallow your spit  Contact your healthcare provider if:   You have a fever  You have a rash or ear pain  You have green, yellow-brown, or bloody mucus when you cough or blow your nose  You are unable to drink anything  You have questions or concerns about your condition or care  Medicines:   Antibiotics  help treat your strep throat  You should feel better within 2 to 3 days after you start antibiotics  Take your medicine as directed  Contact your healthcare provider if you think your medicine is not helping or if you have side effects  Tell him or her if you are allergic to any medicine  Keep a list of the medicines, vitamins, and herbs you take  Include the amounts, and when and why you take them  Bring the list or the pill bottles to follow-up visits  Carry your medicine list with you in case of an emergency  Manage your symptoms:   Use lozenges, ice, soft foods, or popsicles  to soothe your throat  Drink juice, milk shakes, or soup  if your throat is too sore to eat solid food  Drinking liquids can also help prevent dehydration  Gargle with salt water  Mix ¼ teaspoon salt in a glass of warm water and gargle  This may help reduce swelling in your throat  Do not smoke    Nicotine and other chemicals in cigarettes and cigars can cause lung damage and make your symptoms worse  Ask your healthcare provider for information if you currently smoke and need help to quit  E-cigarettes or smokeless tobacco still contain nicotine  Talk to your healthcare provider before you use these products  Return to work or school  24 hours after you start antibiotic medicine  Prevent the spread of strep throat:   Wash your hands often  Use soap and water  Wash your hands after you use the bathroom, change a child's diapers, or sneeze  Wash your hands before you prepare or eat food  Do not share food or drinks  Replace your toothbrush after you have taken antibiotics for 24 hours  Follow up with your doctor as directed:  Write down your questions so you remember to ask them during your visits  © [employer] 2022 Information is for End User's use only and may not be sold, redistributed or otherwise used for commercial purposes  All illustrations and images included in CareNotes® are the copyrighted property of A [employer] M [employer]  or Cameron [employer]   The above information is an  only  It is not intended as medical advice for individual conditions or treatments  Talk to your doctor, nurse or pharmacist before following any medical regimen to see if it is safe and effective for you

## 2022-12-13 ENCOUNTER — OFFICE VISIT (OUTPATIENT)
Dept: URGENT CARE | Age: 57
End: 2022-12-13

## 2022-12-13 VITALS — OXYGEN SATURATION: 99 % | TEMPERATURE: 96.9 F | HEART RATE: 94 BPM | RESPIRATION RATE: 16 BRPM

## 2022-12-13 DIAGNOSIS — L08.9 LOCAL INFECTION OF THE SKIN AND SUBCUTANEOUS TISSUE, UNSPECIFIED: Primary | ICD-10-CM

## 2022-12-13 RX ORDER — CEPHALEXIN 500 MG/1
500 CAPSULE ORAL EVERY 8 HOURS SCHEDULED
Qty: 21 CAPSULE | Refills: 0 | Status: SHIPPED | OUTPATIENT
Start: 2022-12-13 | End: 2022-12-20

## 2022-12-14 NOTE — PROGRESS NOTES
[facility]        NAME: Marylou Rodriguez is a 62 y o  male  : 1965    MRN: [id number]  DATE: 2022  TIME: 9:21 PM    Assessment and Plan   Local infection of the skin and subcutaneous tissue, unspecified [L08 9]  1  Local infection of the skin and subcutaneous tissue, unspecified  cephalexin (KEFLEX) 500 mg capsule        Patient states that he is currently on amoxicillin for strep throat  Discussed discontinuing amoxicillin and trialing Keflex for cellulitis as it covers strep  Patient is agreeable to this plan, questions and concerns were addressed during this visit  Patient Instructions     Biotics as discussed  Follow up with PCP in 3-5 days  Proceed to  ER if symptoms worsen  Chief Complaint     Chief Complaint   Patient presents with   • Rash     Since , 2 large red spots with dry skin on right buttock, painful to touch, possible drainage, currently on amoxicilin for ST         History of Present Illness       Patient presenting for evaluation of rash on right upper buttock  Patient states that over the past 2 days he has been having pain and redness to this area  He denies any new lotions, soaps or detergents, but states that he believes that he may have been bitten by an insect  He is unsure of what type of insect may have bitten him  He denies any fevers or chills, but states that there may have been bleeding or drainage from the area  Rash  Pertinent negatives include no fever or shortness of breath  Review of Systems   Review of Systems   Constitutional: Negative for chills and fever  Respiratory: Negative for shortness of breath  Cardiovascular: Negative for chest pain  Skin: Positive for rash  All other systems reviewed and are negative          Current Medications       Current Outpatient Medications:   •  amoxicillin (AMOXIL) 500 mg capsule, Take 1 capsule (500 mg total) by mouth every 12 (twelve) hours for 10 days, Disp: 20 capsule, Rfl: 0  •  cephalexin (KEFLEX) 500 mg capsule, Take 1 capsule (500 mg total) by mouth every 8 (eight) hours for 7 days, Disp: 21 capsule, Rfl: 0  •  losartan (COZAAR) 100 MG tablet, Take 1 tablet (100 mg total) by mouth daily, Disp: 90 tablet, Rfl: 1    Current Allergies     Allergies as of 12/13/2022   • (No Known Allergies)            The following portions of the patient's history were reviewed and updated as appropriate: allergies, current medications, past family history, past medical history, past social history, past surgical history and problem list      Past Medical History:   Diagnosis Date   • Hypertension    • Sleep apnea        Past Surgical History:   Procedure Laterality Date   • KNEE SURGERY Bilateral        Family History   Problem Relation Age of Onset   • Hypertension Mother    • No Known Problems Father          Medications have been verified  Objective   Pulse 94   Temp (!) 96 9 °F (36 1 °C)   Resp 16   SpO2 99%        Physical Exam     Physical Exam  Vitals and nursing note reviewed  Constitutional:       General: He is not in acute distress  Appearance: Normal appearance  He is normal weight  He is not ill-appearing, toxic-appearing or diaphoretic  HENT:      Head: Normocephalic and atraumatic  Eyes:      General:         Right eye: No discharge  Left eye: No discharge  Skin:     General: Skin is warm and dry  Findings: Erythema and lesion present  Neurological:      Mental Status: He is alert

## 2022-12-14 NOTE — PATIENT INSTRUCTIONS
Please take antibiotics 3 times daily for the next 7 days  If skin infection persist, please follow-up with your PCP

## 2022-12-29 ENCOUNTER — OFFICE VISIT (OUTPATIENT)
Dept: FAMILY MEDICINE CLINIC | Facility: CLINIC | Age: 57
End: 2022-12-29

## 2022-12-29 VITALS
DIASTOLIC BLOOD PRESSURE: 84 MMHG | SYSTOLIC BLOOD PRESSURE: 132 MMHG | OXYGEN SATURATION: 100 % | WEIGHT: 233.4 LBS | TEMPERATURE: 97.2 F | RESPIRATION RATE: 16 BRPM | HEIGHT: 71 IN | HEART RATE: 68 BPM | BODY MASS INDEX: 32.68 KG/M2

## 2022-12-29 DIAGNOSIS — F17.210 CIGARETTE NICOTINE DEPENDENCE WITHOUT COMPLICATION: ICD-10-CM

## 2022-12-29 DIAGNOSIS — E78.5 MILD HYPERLIPIDEMIA: ICD-10-CM

## 2022-12-29 DIAGNOSIS — I10 ESSENTIAL HYPERTENSION: ICD-10-CM

## 2022-12-29 DIAGNOSIS — Z12.2 SCREENING FOR LUNG CANCER: ICD-10-CM

## 2022-12-29 DIAGNOSIS — Z00.00 ANNUAL PHYSICAL EXAM: Primary | ICD-10-CM

## 2022-12-29 RX ORDER — NICOTINE 21 MG/24HR
1 PATCH, TRANSDERMAL 24 HOURS TRANSDERMAL EVERY 24 HOURS
Qty: 28 PATCH | Refills: 0 | Status: SHIPPED | OUTPATIENT
Start: 2022-12-29

## 2022-12-29 RX ORDER — CHOLECALCIFEROL (VITAMIN D3) 50 MCG
2000 TABLET ORAL DAILY
COMMUNITY

## 2022-12-29 NOTE — PATIENT INSTRUCTIONS

## 2022-12-29 NOTE — PROGRESS NOTES
[facility] PRACT[facility]    NAME: Christina Pacheco  AGE: 62 y o  SEX: male  : 1965     DATE: 2022     Assessment and Plan:     Problem List Items Addressed This Visit        Cardiovascular and Mediastinum    Essential hypertension   Other Visit Diagnoses     Annual physical exam    -  Primary    Rosita Lima is stable on exam   He is to work on a lower carb/salt diet, and get regular exercise  FBW slips reprinted for the pt  Mild hyperlipidemia        Hx of - checking FBW  Cigarette nicotine dependence without complication        Disc options - will start Nicotine Patches  Relevant Medications    nicotine (NICODERM CQ) 21 mg/24 hr TD 24 hr patch    Other Relevant Orders    CT lung screening program    Screening for lung cancer        CT Lungs again ordered for the pt - LDCT  Relevant Orders    CT lung screening program    BMI 32 0-32 9,adult        Diet and exercise as above  Immunizations and preventive care screenings were discussed with patient today  Appropriate education was printed on patient's after visit summary  Discussed risks and benefits of prostate cancer screening  We discussed the controversial history of PSA screening for prostate cancer in the [place] as well as the risk of over detection and over treatment of prostate cancer by way of PSA screening  The patient understands that PSA blood testing is an imperfect way to screen for prostate cancer and that elevated PSA levels in the blood may also be caused by infection, inflammation, prostatic trauma or manipulation, urological procedures, or by benign prostatic enlargement  The role of the digital rectal examination in prostate cancer screening was also discussed and I discussed with him that there is large interobserver variability in the findings of digital rectal examination      Counseling:  Dental Health: discussed importance of regular tooth brushing, flossing, and dental visits  · Exercise: the importance of regular exercise/physical activity was discussed  Recommend exercise 3-5 times per week for at least 30 minutes  BMI Counseling: Body mass index is 32 28 kg/m²  The BMI is above normal  Nutrition recommendations include moderation in carbohydrate intake  Exercise recommendations include exercising 3-5 times per week  No pharmacotherapy was ordered  Patient referred to PCP  Rationale for BMI follow-up plan is due to patient being overweight or obese  Depression Screening and Follow-up Plan: Patient was screened for depression during today's encounter  They screened negative with a PHQ-2 score of 0  Tobacco Cessation Counseling: Tobacco cessation counseling was provided  The patient is sincerely urged to quit consumption of tobacco  He is ready to quit tobacco  Medication options and side effects of medication discussed  Patient agreed to medication  Nicotine patch was prescribed  Return in 6 months (on 6/29/2023) for Recheck  Chief Complaint:     Chief Complaint   Patient presents with   • Annual Exam     Patient here for  Annual wellness exam      History of Present Illness:     Adult Annual Physical   Patient here for a comprehensive physical exam  The patient reports no problems  Gerardo Moctezuma presents with his wife today - still smoking; [employer] not covered previously, but has different insurance now  CT for Lung Cancer Screening, MRI of the Lumbar Spine, and FBW ordered previously - not completed  Pt is vaccinated against COV-19  Declines Flu Vaccine  Gerardo Moctezuma had colonoscopy in 11/2021 - 2 polyps; due again in 2023  Diet and Physical Activity  · Diet/Nutrition: limited junk food  · Exercise: no formal exercise        Depression Screening  PHQ-2/9 Depression Screening    Little interest or pleasure in doing things: 0 - not at all  Feeling down, depressed, or hopeless: 0 - not at all  PHQ-2 Score: 0  PHQ-2 Interpretation: Negative depression screen       General Health  · Sleep: sleeps well  · Hearing: normal - bilateral   · Vision: no vision problems  · Dental: regular dental visits   Health  · Symptoms include: none     Review of Systems:     Review of Systems   Constitutional: Negative for activity change  Respiratory: Negative for shortness of breath  Cardiovascular: Negative for chest pain  Gastrointestinal: Negative for abdominal pain and blood in stool  Genitourinary: Negative for decreased urine volume and difficulty urinating  Psychiatric/Behavioral: Negative for dysphoric mood  The patient is not nervous/anxious         Past Medical History:     Past Medical History:   Diagnosis Date   • Hypertension    • Sleep apnea       Past Surgical History:     Past Surgical History:   Procedure Laterality Date   • KNEE SURGERY Bilateral       Family History:     Family History   Problem Relation Age of Onset   • Hypertension Mother    • No Known Problems Father       Social History:     Social History     Socioeconomic History   • Marital status: /Civil Union     Spouse name: None   • Number of children: None   • Years of education: None   • Highest education level: None   Occupational History   • None   Tobacco Use   • Smoking status: Every Day     Packs/day: 0 25     Years: 32 00     Pack years: 8 00     Types: Cigarettes   • Smokeless tobacco: Never   • Tobacco comments:     1 PPD x 15 years - As per [employer]   • Vaping Use: Never used   Substance and Sexual Activity   • Alcohol use: Yes     Comment: rare social use one time per month   • Drug use: Never   • Sexual activity: Yes     Partners: Female   Other Topics Concern   • None   Social History Narrative    Current some day smoker - As per [employer]         · Most recent tobacco use screening:   10-      · Do you currently or have you served in the [employer]:   No     As per [employer]     Financial Resource Strain: Not on file   Food Insecurity: Not on file   Transportation Needs: Not on file   Physical Activity: Not on file   Stress: Not on file   Social Connections: Not on file   Intimate Partner Violence: Not on file   Housing Stability: Not on file      Current Medications:     Current Outpatient Medications   Medication Sig Dispense Refill   • ascorbic acid (VITAMIN C) 250 MG CHEW Chew 250 mg daily Taking 2 daily     • Cholecalciferol (Vitamin D) 50 MCG (2000 UT) tablet Take 2,000 Units by mouth daily     • losartan (COZAAR) 100 MG tablet Take 1 tablet (100 mg total) by mouth daily 90 tablet 1   • nicotine (NICODERM CQ) 21 mg/24 hr TD 24 hr patch Place 1 patch on the skin every 24 hours 28 patch 0     No current facility-administered medications for this visit  Allergies:     No Known Allergies   Physical Exam:     /84 (BP Location: Right arm, Patient Position: Sitting)   Pulse 68   Temp (!) 97 2 °F (36 2 °C) (Tympanic)   Resp 16   Ht 5' 11 3" (1 811 m)   Wt 106 kg (233 lb 6 4 oz)   SpO2 100%   BMI 32 28 kg/m²     Physical Exam  Vitals and nursing note reviewed  Constitutional:       General: He is not in acute distress  Appearance: Normal appearance  He is not ill-appearing, toxic-appearing or diaphoretic  HENT:      Head: Normocephalic and atraumatic  Eyes:      General: No scleral icterus  Conjunctiva/sclera: Conjunctivae normal    Cardiovascular:      Rate and Rhythm: Normal rate and regular rhythm  Heart sounds: Normal heart sounds  No murmur heard  No friction rub  No gallop  Pulmonary:      Effort: Pulmonary effort is normal  No respiratory distress  Breath sounds: Normal breath sounds  No stridor  No wheezing, rhonchi or rales  Abdominal:      General: Abdomen is flat  Bowel sounds are normal  There is no distension  Palpations: Abdomen is soft  There is no mass  Tenderness: There is no abdominal tenderness   There is no guarding or rebound  Musculoskeletal:      Cervical back: Normal range of motion and neck supple  No rigidity or tenderness  Lymphadenopathy:      Cervical: No cervical adenopathy  Neurological:      Mental Status: He is alert and oriented to person, place, and time  Psychiatric:         Mood and Affect: Mood normal          Behavior: Behavior normal          Thought Content: Thought content normal          Judgment: Judgment normal      Lupe Guerra was seen today for annual exam     Diagnoses and all orders for this visit:    Annual physical exam  Comments:  Klaus Phipps is stable on exam   He is to work on a lower carb/salt diet, and get regular exercise  FBW slips reprinted for the pt  Essential hypertension  Comments:  Stable on present management  Mild hyperlipidemia  Comments:  Hx of - checking FBW  Cigarette nicotine dependence without complication  Comments:  Disc options - will start Nicotine Patches  Orders:  -     CT lung screening program; Future  -     nicotine (NICODERM CQ) 21 mg/24 hr TD 24 hr patch; Place 1 patch on the skin every 24 hours    Screening for lung cancer  Comments:  CT Lungs again ordered for the pt - LDCT  Orders:  -     CT lung screening program; Future    BMI 32 0-32 9,adult  Comments:  Diet and exercise as above             [address] Cardenas, [facility]

## 2023-01-10 ENCOUNTER — APPOINTMENT (OUTPATIENT)
Dept: LAB | Facility: CLINIC | Age: 58
End: 2023-01-10

## 2023-01-10 DIAGNOSIS — Z12.5 SCREENING FOR PROSTATE CANCER: ICD-10-CM

## 2023-01-10 DIAGNOSIS — E78.2 MIXED HYPERLIPIDEMIA: ICD-10-CM

## 2023-01-10 DIAGNOSIS — Z13.1 SCREENING FOR DIABETES MELLITUS: ICD-10-CM

## 2023-01-10 LAB
ALBUMIN SERPL BCP-MCNC: 3.8 G/DL (ref 3.5–5)
ALP SERPL-CCNC: 63 U/L (ref 46–116)
ALT SERPL W P-5'-P-CCNC: 25 U/L (ref 12–78)
ANION GAP SERPL CALCULATED.3IONS-SCNC: 6 MMOL/L (ref 4–13)
AST SERPL W P-5'-P-CCNC: 20 U/L (ref 5–45)
BILIRUB SERPL-MCNC: 0.57 MG/DL (ref 0.2–1)
BUN SERPL-MCNC: 14 MG/DL (ref 5–25)
CALCIUM SERPL-MCNC: 9.9 MG/DL (ref 8.3–10.1)
CHLORIDE SERPL-SCNC: 107 MMOL/L (ref 96–108)
CHOLEST SERPL-MCNC: 239 MG/DL
CO2 SERPL-SCNC: 26 MMOL/L (ref 21–32)
CREAT SERPL-MCNC: 0.99 MG/DL (ref 0.6–1.3)
GFR SERPL CREATININE-BSD FRML MDRD: 84 ML/MIN/1.73SQ M
GLUCOSE P FAST SERPL-MCNC: 96 MG/DL (ref 65–99)
HDLC SERPL-MCNC: 39 MG/DL
LDLC SERPL CALC-MCNC: 172 MG/DL (ref 0–100)
POTASSIUM SERPL-SCNC: 4.9 MMOL/L (ref 3.5–5.3)
PROT SERPL-MCNC: 7.6 G/DL (ref 6.4–8.4)
PSA SERPL-MCNC: 0.4 NG/ML (ref 0–4)
SODIUM SERPL-SCNC: 139 MMOL/L (ref 135–147)
TRIGL SERPL-MCNC: 140 MG/DL

## 2023-01-16 DIAGNOSIS — Z13.1 SCREENING FOR DIABETES MELLITUS: ICD-10-CM

## 2023-01-16 DIAGNOSIS — E78.00 PURE HYPERCHOLESTEROLEMIA: Primary | ICD-10-CM

## 2023-01-16 RX ORDER — ROSUVASTATIN CALCIUM 10 MG/1
10 TABLET, COATED ORAL DAILY
Qty: 90 TABLET | Refills: 1 | Status: SHIPPED | OUTPATIENT
Start: 2023-01-16 | End: 2023-07-15

## 2023-05-13 DIAGNOSIS — I10 ESSENTIAL HYPERTENSION: ICD-10-CM

## 2023-05-13 RX ORDER — LOSARTAN POTASSIUM 100 MG/1
TABLET ORAL
Qty: 90 TABLET | Refills: 1 | Status: SHIPPED | OUTPATIENT
Start: 2023-05-13

## 2023-06-01 ENCOUNTER — OFFICE VISIT (OUTPATIENT)
Dept: URGENT CARE | Age: 58
End: 2023-06-01

## 2023-06-01 VITALS — TEMPERATURE: 98.2 F | OXYGEN SATURATION: 99 % | HEART RATE: 94 BPM | RESPIRATION RATE: 18 BRPM

## 2023-06-01 DIAGNOSIS — M79.675 GREAT TOE PAIN, LEFT: Primary | ICD-10-CM

## 2023-06-01 RX ORDER — PREDNISONE 20 MG/1
20 TABLET ORAL 2 TIMES DAILY WITH MEALS
Qty: 10 TABLET | Refills: 0 | Status: SHIPPED | OUTPATIENT
Start: 2023-06-01 | End: 2023-06-06

## 2023-06-01 NOTE — PROGRESS NOTES
[facility]        NAME: Rere Saucedo is a 62 y o  male  : 1965    MRN: [id number]  DATE: 2023  TIME: 7:00 PM    Assessment and Plan   Great toe pain, left [M79 [id number]]  1  Great toe pain, left  predniSONE 20 mg tablet            Patient Instructions     Take medication as prescribed  You likely have gout; follow-up with PCP for further testing  Follow up with PCP in 3-5 days  Proceed to  ER if symptoms worsen  Chief Complaint     Chief Complaint   Patient presents with   • Toe Pain     Patient states that for a few days now he has had severe pain in the left big toe  States that he does not know the mechanism of injury  No history of gout         History of Present Illness       HPI   Presents to the clinic with complaint of pain and swelling on the left great toe, with increased warmness  Ongoing for 2 5 days  Getting worse  Denies history of gout  No trauma  No fever    Review of Systems   Review of Systems   Constitutional: Negative for fever  Musculoskeletal: Positive for arthralgias (Left great toe), gait problem (Limping, because of pain on the left great toe) and joint swelling  Skin: Positive for color change (Red)  Negative for wound  Neurological: Negative for numbness           Current Medications       Current Outpatient Medications:   •  ascorbic acid (VITAMIN C) 250 MG CHEW, Chew 250 mg daily Taking 2 daily, Disp: , Rfl:   •  Cholecalciferol (Vitamin D) 50 MCG (2000 UT) tablet, Take 2,000 Units by mouth daily, Disp: , Rfl:   •  losartan (COZAAR) 100 MG tablet, TAKE 1 TABLET BY MOUTH EVERY DAY, Disp: 90 tablet, Rfl: 1  •  predniSONE 20 mg tablet, Take 1 tablet (20 mg total) by mouth 2 (two) times a day with meals for 5 days, Disp: 10 tablet, Rfl: 0  •  rosuvastatin (CRESTOR) 10 MG tablet, Take 1 tablet (10 mg total) by mouth daily, Disp: 90 tablet, Rfl: 1  •  nicotine (NICODERM CQ) 21 mg/24 hr TD 24 hr patch, Place 1 patch on the skin every 24 hours (Patient not taking: Reported on 6/1/2023), Disp: 28 patch, Rfl: 0    Current Allergies     Allergies as of 06/01/2023   • (No Known Allergies)            The following portions of the patient's history were reviewed and updated as appropriate: allergies, current medications, past family history, past medical history, past social history, past surgical history and problem list      Past Medical History:   Diagnosis Date   • Hypertension    • Sleep apnea        Past Surgical History:   Procedure Laterality Date   • KNEE SURGERY Bilateral        Family History   Problem Relation Age of Onset   • Hypertension Mother    • No Known Problems Father          Medications have been verified  Objective   Pulse 94   Temp 98 2 °F (36 8 °C)   Resp 18   SpO2 99%   No LMP for male patient  Physical Exam     Physical Exam  Musculoskeletal:         General: Swelling (There is increased warmness along the IP joint of the left great toe  Also mild to moderate swelling of the great toe and extending towards the dorsal aspect of the left foot) and tenderness (With palpation of the IP joint of the left great toe) present  No deformity  Comments: Slightly decreased range of motion of the left great toenail because of pain on the IP joint   Skin:     Capillary Refill: Capillary refill takes less than 2 seconds  Findings: Erythema present

## 2023-07-15 DIAGNOSIS — E78.00 PURE HYPERCHOLESTEROLEMIA: ICD-10-CM

## 2023-07-15 RX ORDER — ROSUVASTATIN CALCIUM 10 MG/1
TABLET, COATED ORAL
Qty: 90 TABLET | Refills: 1 | Status: SHIPPED | OUTPATIENT
Start: 2023-07-15

## 2023-08-22 ENCOUNTER — OFFICE VISIT (OUTPATIENT)
Dept: FAMILY MEDICINE CLINIC | Facility: CLINIC | Age: 58
End: 2023-08-22
Payer: COMMERCIAL

## 2023-08-22 VITALS
RESPIRATION RATE: 16 BRPM | OXYGEN SATURATION: 97 % | BODY MASS INDEX: 31.26 KG/M2 | WEIGHT: 226 LBS | DIASTOLIC BLOOD PRESSURE: 96 MMHG | TEMPERATURE: 96.5 F | HEART RATE: 86 BPM | SYSTOLIC BLOOD PRESSURE: 142 MMHG

## 2023-08-22 DIAGNOSIS — M10.9 GOUT OF LEFT FOOT, UNSPECIFIED CAUSE, UNSPECIFIED CHRONICITY: ICD-10-CM

## 2023-08-22 DIAGNOSIS — M79.89 LEG SWELLING: Primary | ICD-10-CM

## 2023-08-22 DIAGNOSIS — I10 ESSENTIAL HYPERTENSION: ICD-10-CM

## 2023-08-22 PROCEDURE — 99213 OFFICE O/P EST LOW 20 MIN: CPT | Performed by: FAMILY MEDICINE

## 2023-08-22 NOTE — PROGRESS NOTES
Name: Janice Holden      : 1965      MRN: [id number]  Encounter Provider: Shelly Adam DO  Encounter Date: 2023   Encounter department: [facility]     1. Leg swelling  Assessment & Plan:  - Pt noting bilateral leg swelling sometimes up to lower calf by end of day. 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 Denies any pain in legs, no redness, warmth, tenderness. Does admit in the AM after sleep his swelling is much improved. No pain in legs with walking, no dyspnea on exertion.   - Bilateral DP/PT pulse present.   - Long hx of HTN - Taking Losartan 100 mg po daily w/o s/e at home. Does not monitor bp at home. Plan:  - Will check CMP, Echo ordered. - Low salt diet discussed and recommended. - Recommend compression stockings during the day. - Consider additional bp management as above as needed. - Follow up in 3 months at annual or before with any changes. Orders:  -     Echo complete w/ contrast if indicated; Future; Expected date: 2023    2. Essential hypertension  Assessment & Plan:  Blood Pressure: 142/96    - Stable. Taking meds as prescribed. Denies monitoring at home so unsure if high or not. - Will check CMP. - Recommend low salt diet - no more then 2 g per day. - Discussed recommended exercise - AHA recommends 150 og mod intensity exercise per week. - Pt to monitor BP at home and FU with continued elevation. Will FU with lab results and consider additional bp control. 3. Gout of left foot, unspecified cause, unspecified chronicity  Assessment & Plan:  - Notes Gout attack in 2023. Pt notes this is his first episode. Treated at Urgent care with Prednisone burst with good response. Has not had another flare but wanted to check if leg swelling now was related. - Currently no pain in legs, great toes, no warmth, no redness. Plan:  - Continue to monitor for recurrence.    - Recommend low purine diet - handout provided. - If recurrence will consider Uric acid level testing and urate lower medicines. - Discussed use of NSAIDs during acute flares. Pt to contact office should he have recurrence. Evelin Bryant is a 19-year-old male with past medical history of hypertension, hyperlipidemia who presents today for evaluation of bilateral leg swelling. Of note he had recent diagnosis of gout flare June 2023 and was treated at urgent care with steroids which resolved. He has noted since then he has had some lower leg swelling occasionally up to his mid calf after work. He wanted to know if this was related to the gout. He denies any other symptoms. Patient works as a  and is seated most of the day does report getting up and down though throughout the day. He denies any current pain in his legs or calves. Denies any redness, warmth, tenderness to touch. He does note that he may eat a high salt diet. He is active with gardening work however does not have dedicated exercise plan. He denies any shortness of breath, sweating, changes in his ability to do activities. Denies any cramping in his legs with walking or activities. Denies any other symptoms at this time. Review of Systems   Constitutional: Negative for chills, fatigue and fever. HENT: Negative for congestion, ear pain, postnasal drip, rhinorrhea, sinus pain, sore throat and trouble swallowing. Eyes: Negative for pain and visual disturbance. Respiratory: Negative for cough and shortness of breath. Cardiovascular: Positive for leg swelling. Negative for chest pain and palpitations. Gastrointestinal: Negative for abdominal pain, constipation, diarrhea, nausea and vomiting. Genitourinary: Negative for dysuria and hematuria. Musculoskeletal: Negative for arthralgias and back pain. Skin: Negative for color change and rash.    Neurological: Negative for dizziness, seizures, syncope, weakness and headaches. Psychiatric/Behavioral: Negative for confusion and sleep disturbance. The patient is not nervous/anxious. All other systems reviewed and are negative. Current Outpatient Medications on File Prior to Visit   Medication Sig   • ascorbic acid (VITAMIN C) 250 MG CHEW Chew 250 mg daily Taking 2 daily   • Cholecalciferol (Vitamin D) 50 MCG (2000 UT) tablet Take 2,000 Units by mouth daily   • losartan (COZAAR) 100 MG tablet TAKE 1 TABLET BY MOUTH EVERY DAY   • rosuvastatin (CRESTOR) 10 MG tablet TAKE 1 TABLET BY MOUTH EVERY DAY   • nicotine (NICODERM CQ) 21 mg/24 hr TD 24 hr patch Place 1 patch on the skin every 24 hours (Patient not taking: Reported on 6/1/2023)       Objective     /96   Pulse 86   Temp (!) 96.5 °F (35.8 °C)   Resp 16   Wt 103 kg (226 lb)   SpO2 97%   BMI 31.26 kg/m²     Physical Exam  Vitals and nursing note reviewed. Constitutional:       Appearance: Normal appearance. HENT:      Head: Normocephalic and atraumatic. Right Ear: External ear normal.      Left Ear: External ear normal.      Nose: Nose normal.      Mouth/Throat:      Mouth: Mucous membranes are moist.   Eyes:      Extraocular Movements: Extraocular movements intact. Conjunctiva/sclera: Conjunctivae normal.      Pupils: Pupils are equal, round, and reactive to light. Cardiovascular:      Rate and Rhythm: Normal rate and regular rhythm. Pulses: Normal pulses. Heart sounds: Normal heart sounds. No murmur heard. Pulmonary:      Effort: Pulmonary effort is normal.      Breath sounds: Normal breath sounds. No wheezing or rales. Abdominal:      General: Bowel sounds are normal.      Palpations: Abdomen is soft. Musculoskeletal:         General: No tenderness. Normal range of motion. Cervical back: Normal range of motion. Right lower leg: Edema present. Left lower leg: Edema present.       Comments: + 1 pitting edema bilaterally to ankles    Negative Homans sign, neg TTP at calf/ ankles. DP/PT pulses 2+   Lymphadenopathy:      Cervical: No cervical adenopathy. Skin:     General: Skin is warm. Capillary Refill: Capillary refill takes less than 2 seconds. Findings: No erythema or rash. Neurological:      General: No focal deficit present. Mental Status: He is alert and oriented to person, place, and time.    Psychiatric:         Mood and Affect: Mood normal.         Behavior: Behavior normal.       Zehra Lagos, DO

## 2023-08-22 NOTE — ASSESSMENT & PLAN NOTE
- Pt noting bilateral leg swelling sometimes up to lower calf by end of day. 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 Denies any pain in legs, no redness, warmth, tenderness. Does admit in the AM after sleep his swelling is much improved. No pain in legs with walking, no dyspnea on exertion.   - Bilateral DP/PT pulse present.   - Long hx of HTN - Taking Losartan 100 mg po daily w/o s/e at home. Does not monitor bp at home. Plan:  - Will check CMP, Echo ordered. - Low salt diet discussed and recommended. - Recommend compression stockings during the day. - Consider additional bp management as above as needed. - Follow up in 3 months at annual or before with any changes.

## 2023-08-22 NOTE — PATIENT INSTRUCTIONS
Low Purine Diet   WHAT YOU NEED TO KNOW:   A low-purine diet is a meal plan based on foods that are low in purine content. Purine is a substance that is found in foods and is produced naturally by the body. Purines are broken down by the body and changed to uric acid. The kidneys normally filter the uric acid, and it leaves the body through the urine. However, people with gout sometimes have a buildup of uric acid in the blood. This buildup of uric acid can cause swelling and pain (a gout attack). A low-purine diet may help to treat and prevent gout attacks. DISCHARGE INSTRUCTIONS:   Foods to include: The following foods are low in purine. Eggs, nuts, and peanut butter    Low-fat and fat-free cheese and ice cream    Skim or 1% milk    Soup made without meat extract or broth    Vegetables that are not on the medium-purine list below    All fruit and fruit juice    Bread, pasta, rice, cakes, cornbread, and popcorn    Water, soda, tea, coffee, and cocoa    Sugar, sweets, and gelatin    Fat and oil    Foods to limit:   Medium-purine foods:     Meats:  Limit the following to 4 to 6 ounces each day. Meat and poultry     Crab, lobster, oysters, and shrimp    Vegetables:  Limit the following vegetables to ½ cup each day. Asparagus    Cauliflower    Spinach    Mushrooms    Green peas    Beans, peas, and lentils (limit to 1 cup each day)    Oats and oatmeal (limit to ? cup uncooked each day)    Wheat germ and bran (limit to ¼ cup each day)    High-purine foods:  Limit or avoid foods high in purine. Anchovies, sardines, scallops, and mussels    [place], codfish, herring, and [employer], like goose and duck    Organ meats, such as brains, heart, kidney, liver, and sweetbreads    Gravies and sauces made with meat    Yeast extracts taken in the form of a supplement    Other guidelines to follow:   Increase liquid intake. Drink 8 to 16 (eight-ounce) cups of liquid each day.  At least half of the liquid you drink should be water. Liquid can help your body get rid of extra uric acid. Limit or avoid alcohol. Alcohol (especially beer) increases your risk of a gout attack. Beer contains a high amount of purine. Maintain a healthy weight. If you are overweight, you should lose weight slowly. Weight loss can help decrease the amount of stress on your joints. Regular exercise can help you lose weight if you are overweight, or maintain your weight if you are at a normal weight. Talk to your healthcare provider before you begin an exercise program.    © Copyright [facility] 2022 Information is for End User's use only and may not be sold, redistributed or otherwise used for commercial purposes. The above information is an  only. It is not intended as medical advice for individual conditions or treatments. Talk to your doctor, nurse or pharmacist before following any medical regimen to see if it is safe and effective for you.

## 2023-08-22 NOTE — ASSESSMENT & PLAN NOTE
Blood Pressure: 142/96    - Stable. Taking meds as prescribed. Denies monitoring at home so unsure if high or not. - Will check CMP. - Recommend low salt diet - no more then 2 g per day. - Discussed recommended exercise - AHA recommends 150 og mod intensity exercise per week. - Pt to monitor BP at home and FU with continued elevation. Will FU with lab results and consider additional bp control.

## 2023-08-22 NOTE — ASSESSMENT & PLAN NOTE
- Notes Gout attack in 6/2023. Pt notes this is his first episode. Treated at Urgent care with Prednisone burst with good response. Has not had another flare but wanted to check if leg swelling now was related. - Currently no pain in legs, great toes, no warmth, no redness. Plan:  - Continue to monitor for recurrence. - Recommend low purine diet - handout provided. - If recurrence will consider Uric acid level testing and urate lower medicines. - Discussed use of NSAIDs during acute flares. Pt to contact office should he have recurrence.

## 2023-09-15 ENCOUNTER — HOSPITAL ENCOUNTER (OUTPATIENT)
Dept: NON INVASIVE DIAGNOSTICS | Facility: HOSPITAL | Age: 58
Discharge: HOME | End: 2023-09-15
Attending: FAMILY MEDICINE
Payer: COMMERCIAL

## 2023-09-15 ENCOUNTER — HOSPITAL ENCOUNTER (OUTPATIENT)
Dept: CT IMAGING | Facility: HOSPITAL | Age: 58
End: 2023-09-15
Payer: COMMERCIAL

## 2023-09-15 VITALS
SYSTOLIC BLOOD PRESSURE: 142 MMHG | BODY MASS INDEX: 31.64 KG/M2 | WEIGHT: 226 LBS | HEART RATE: 86 BPM | HEIGHT: 71 IN | DIASTOLIC BLOOD PRESSURE: 96 MMHG

## 2023-09-15 DIAGNOSIS — F17.210 CIGARETTE NICOTINE DEPENDENCE WITHOUT COMPLICATION: ICD-10-CM

## 2023-09-15 DIAGNOSIS — Z12.2 SCREENING FOR LUNG CANCER: ICD-10-CM

## 2023-09-15 DIAGNOSIS — M79.89 LEG SWELLING: ICD-10-CM

## 2023-09-15 LAB
AORTIC ROOT: 4 CM
APICAL FOUR CHAMBER EJECTION FRACTION: 59 %
ASCENDING AORTA: 4.2 CM
E WAVE DECELERATION TIME: 169 MS
FRACTIONAL SHORTENING: 31 (ref 28–44)
INTERVENTRICULAR SEPTUM IN DIASTOLE (PARASTERNAL SHORT AXIS VIEW): 1.5 CM
INTERVENTRICULAR SEPTUM: 1.5 CM (ref 0.6–1.1)
LAAS-AP2: 19.8 CM2
LAAS-AP4: 17 CM2
LEFT ATRIUM SIZE: 4.5 CM
LEFT ATRIUM VOLUME (MOD BIPLANE): 49 ML
LEFT INTERNAL DIMENSION IN SYSTOLE: 3.3 CM (ref 2.1–4)
LEFT VENTRICULAR INTERNAL DIMENSION IN DIASTOLE: 4.8 CM (ref 3.5–6)
LEFT VENTRICULAR POSTERIOR WALL IN END DIASTOLE: 1.5 CM
LEFT VENTRICULAR STROKE VOLUME: 64 ML
LVSV (TEICH): 64 ML
MV E'TISSUE VEL-SEP: 8 CM/S
MV PEAK A VEL: 0.62 M/S
MV PEAK E VEL: 83 CM/S
MV STENOSIS PRESSURE HALF TIME: 49 MS
MV VALVE AREA P 1/2 METHOD: 4.49
RA PRESSURE ESTIMATED: 5 MMHG
RIGHT ATRIUM AREA SYSTOLE A4C: 19.4 CM2
RIGHT VENTRICLE ID DIMENSION: 3.4 CM
SL CV LEFT ATRIUM LENGTH A2C: 5.5 CM
SL CV LV EF: 60
SL CV PED ECHO LEFT VENTRICLE DIASTOLIC VOLUME (MOD BIPLANE) 2D: 107 ML
SL CV PED ECHO LEFT VENTRICLE SYSTOLIC VOLUME (MOD BIPLANE) 2D: 43 ML
TRICUSPID ANNULAR PLANE SYSTOLIC EXCURSION: 2 CM

## 2023-09-15 PROCEDURE — 71271 CT THORAX LUNG CANCER SCR C-: CPT

## 2023-09-15 PROCEDURE — 93306 TTE W/DOPPLER COMPLETE: CPT | Performed by: INTERNAL MEDICINE

## 2023-09-15 PROCEDURE — 93306 TTE W/DOPPLER COMPLETE: CPT

## 2023-09-21 ENCOUNTER — HOSPITAL ENCOUNTER (EMERGENCY)
Facility: HOSPITAL | Age: 58
Discharge: HOME | End: 2023-09-21
Attending: EMERGENCY MEDICINE
Payer: COMMERCIAL

## 2023-09-21 ENCOUNTER — APPOINTMENT (EMERGENCY)
Dept: RADIOLOGY | Facility: HOSPITAL | Age: 58
End: 2023-09-21
Payer: COMMERCIAL

## 2023-09-21 VITALS
RESPIRATION RATE: 20 BRPM | BODY MASS INDEX: 31.79 KG/M2 | HEIGHT: 71 IN | HEART RATE: 70 BPM | OXYGEN SATURATION: 97 % | SYSTOLIC BLOOD PRESSURE: 166 MMHG | WEIGHT: 227.07 LBS | TEMPERATURE: 98.7 F | DIASTOLIC BLOOD PRESSURE: 116 MMHG

## 2023-09-21 DIAGNOSIS — M25.462 EFFUSION OF LEFT KNEE: Primary | ICD-10-CM

## 2023-09-21 DIAGNOSIS — M79.661 BILATERAL CALF PAIN: ICD-10-CM

## 2023-09-21 DIAGNOSIS — M79.662 BILATERAL CALF PAIN: ICD-10-CM

## 2023-09-21 LAB
APPEARANCE FLD: ABNORMAL
COLOR FLD: ABNORMAL
CRYSTALS SNV QL MICRO: NORMAL
LYMPHOCYTES # SNV MANUAL: 1 %
NEUTROPHILS NFR SNV MANUAL: 99 %
PATHOLOGY REVIEW: NO
SITE: ABNORMAL
TOTAL CELLS COUNTED SPEC: 100
WBC # FLD MANUAL: 2823 /UL

## 2023-09-21 PROCEDURE — 20610 DRAIN/INJ JOINT/BURSA W/O US: CPT | Performed by: EMERGENCY MEDICINE

## 2023-09-21 PROCEDURE — 99284 EMERGENCY DEPT VISIT MOD MDM: CPT | Performed by: EMERGENCY MEDICINE

## 2023-09-21 PROCEDURE — 87205 SMEAR GRAM STAIN: CPT | Performed by: EMERGENCY MEDICINE

## 2023-09-21 PROCEDURE — 87070 CULTURE OTHR SPECIMN AEROBIC: CPT | Performed by: EMERGENCY MEDICINE

## 2023-09-21 PROCEDURE — 89051 BODY FLUID CELL COUNT: CPT | Performed by: EMERGENCY MEDICINE

## 2023-09-21 PROCEDURE — 73564 X-RAY EXAM KNEE 4 OR MORE: CPT

## 2023-09-21 PROCEDURE — 89060 EXAM SYNOVIAL FLUID CRYSTALS: CPT | Performed by: EMERGENCY MEDICINE

## 2023-09-21 PROCEDURE — 99284 EMERGENCY DEPT VISIT MOD MDM: CPT

## 2023-09-21 RX ORDER — METHYLPREDNISOLONE ACETATE 80 MG/ML
40 INJECTION, SUSPENSION INTRA-ARTICULAR; INTRALESIONAL; INTRAMUSCULAR; SOFT TISSUE ONCE
Status: COMPLETED | OUTPATIENT
Start: 2023-09-21 | End: 2023-09-21

## 2023-09-21 RX ORDER — BUPIVACAINE HYDROCHLORIDE 5 MG/ML
30 INJECTION, SOLUTION EPIDURAL; INTRACAUDAL ONCE
Status: COMPLETED | OUTPATIENT
Start: 2023-09-21 | End: 2023-09-21

## 2023-09-21 RX ADMIN — BUPIVACAINE HYDROCHLORIDE 30 ML: 5 INJECTION, SOLUTION EPIDURAL; INTRACAUDAL; PERINEURAL at 06:21

## 2023-09-21 RX ADMIN — METHYLPREDNISOLONE ACETATE 40 MG: 80 INJECTION, SUSPENSION INTRA-ARTICULAR; INTRALESIONAL; INTRAMUSCULAR; SOFT TISSUE at 06:21

## 2023-09-21 NOTE — ED PROCEDURE NOTE
PROCEDURE  Arthrocentesis    Date/Time: 9/21/2023 7:08 AM    Performed by: Davion Hernandez MD  Authorized by: Davion Hernandez MD  Universal Protocol:  Procedure performed by:  Risks and benefits: risks, benefits and alternatives were discussed  Consent given by: patient  Time out: Immediately prior to procedure a "time out" was called to verify the correct patient, procedure, equipment, support staff and site/side marked as required. Timeout called at: 9/21/2023 7:08 AM.  Patient understanding: patient states understanding of the procedure being performed  Required items: required blood products, implants, devices, and special equipment available  Patient identity confirmed: verbally with patient      Location:  Bedside  Indications:  Joint swelling  Body area:  Knee  Joint:  Left knee  Local anesthesia used?: Yes    Anesthesia:  Local infiltration  Local anesthetic:  Bupivacaine 0.5% without epinephrine  Anesthetic total (ml):  10  Needle gauge: 21 G.   Approach:  Medial  Aspirate:  Blood-tinged  Betamethasone amount (mg):  80  Patient tolerance:  Patient tolerated the procedure well with no immediate complications  Complications (if applicable):  NONE    TOTAL OF 75 ML OF SEROSAGUINOUS FLUID REMOVED FROM LEFT KNEE-  PT TOLERATED RPROCEDURE WELL - MILD PAIN NO COMPLICATIONS-  AREA DRESSED WITH 2 X2/ 4 X4 / CLING AND COBAN --    - PT GIVEN INFORMED CONSENT PRIOR TO PROCEDURE -- AWARE AND ACCEPTS RISK OF BLEEDING/ INFECTION/PAIN          Davion Hernandez MD  09/21/23 1140

## 2023-09-21 NOTE — ED PROVIDER NOTES
History  Chief Complaint   Patient presents with   • Knee Pain     C/o left knee pain/swelling/warm to touch x 1 day. Pt denies recent trauma or fever/chills     62year old Male with PMH of HTN, HLD, and gout presents to the ED complaining of left knee pain that started yesterday. He states that his knee was operated on in the 1990s with screw placement. He is a  and is frequently climbing in and out of the truck. The pain is mostly located on the lateral aspect of the patellar border and is significantly swollen. Patient is having difficulty walking due to the pain. Denies chest pain, SOB, cough, abdominal pain, n/v/d, fever, chills, dizziness, lightheadedness, HA, dysuria, hematuria, hematochezia, or melena. Prior to Admission Medications   Prescriptions Last Dose Informant Patient Reported? Taking? Cholecalciferol (Vitamin D) 50 MCG (2000 UT) tablet  Self Yes No   Sig: Take 2,000 Units by mouth daily   ascorbic acid (VITAMIN C) 250 MG CHEW  Self Yes No   Sig: Chew 250 mg daily Taking 2 daily   losartan (COZAAR) 100 MG tablet   No No   Sig: TAKE 1 TABLET BY MOUTH EVERY DAY   nicotine (NICODERM CQ) 21 mg/24 hr TD 24 hr patch   No No   Sig: Place 1 patch on the skin every 24 hours   Patient not taking: Reported on 6/1/2023   rosuvastatin (CRESTOR) 10 MG tablet   No No   Sig: TAKE 1 TABLET BY MOUTH EVERY DAY      Facility-Administered Medications: None       Past Medical History:   Diagnosis Date   • Hypertension    • Psoriasis    • Sleep apnea        Past Surgical History:   Procedure Laterality Date   • KNEE SURGERY Bilateral        Family History   Problem Relation Age of Onset   • Hypertension Mother    • No Known Problems Father      I have reviewed and agree with the history as documented.     E-Cigarette/Vaping   • E-Cigarette Use Never User      E-Cigarette/Vaping Substances   • Nicotine No    • THC No    • CBD No    • Flavoring No    • Other No    • Unknown No      Social History     Tobacco Use   • Smoking status: Every Day     Packs/day: 1.00     Years: 32.00     Total pack years: 32.00     Types: Cigarettes   • Smokeless tobacco: Never   • Tobacco comments:     1 PPD x 15 years - As per [facility] [employer] Use   • Vaping Use: Never used   Substance Use Topics   • Alcohol use: Yes     Comment: rare social use one time per month   • Drug use: Never        Review of Systems   Constitutional: Negative. Negative for chills, diaphoresis, fatigue and fever. HENT: Negative. Negative for congestion and sore throat. Respiratory: Negative. Negative for cough and shortness of breath. Cardiovascular: Negative. Negative for chest pain. Gastrointestinal: Negative. Negative for abdominal pain, constipation, diarrhea, nausea and vomiting. Genitourinary: Negative. Negative for dysuria and hematuria. Musculoskeletal: Positive for arthralgias and joint swelling. Negative for back pain. Left knee pain and swelling. Skin: Negative. Negative for color change, rash and wound. Neurological: Negative. Negative for dizziness, light-headedness and headaches. Physical Exam  ED Triage Vitals [09/21/23 0520]   Temperature Pulse Respirations Blood Pressure SpO2   98.7 °F (37.1 °C) 70 20 (!) 166/116 97 %      Temp Source Heart Rate Source Patient Position - Orthostatic VS BP Location FiO2 (%)   Oral Monitor Sitting Right arm --      Pain Score       8             Orthostatic Vital Signs  Vitals:    09/21/23 0520   BP: (!) 166/116   Pulse: 70   Patient Position - Orthostatic VS: Sitting       Physical Exam  Vitals and nursing note reviewed. Constitutional:       Appearance: Normal appearance. He is normal weight. HENT:      Head: Normocephalic and atraumatic. Right Ear: External ear normal.      Left Ear: External ear normal.      Nose: Nose normal.      Mouth/Throat:      Pharynx: Oropharynx is clear.    Eyes:      Conjunctiva/sclera: Conjunctivae normal. Cardiovascular:      Rate and Rhythm: Normal rate and regular rhythm. Pulses: Normal pulses. Heart sounds: Normal heart sounds. Comments: RRR with +S1 and S2, no murmurs appreciated on exam. Peripheral pulses intact. Pulmonary:      Effort: Pulmonary effort is normal.      Breath sounds: Normal breath sounds. Comments: CTABL with no abnormal lung sounds such as wheezes or rales appreciated on exam.     Abdominal:      General: Abdomen is flat. Bowel sounds are normal.      Palpations: Abdomen is soft. Comments: Soft, non tender, normo-active bowel sounds. Without rigidity, guarding, or distension. Musculoskeletal:         General: Swelling and tenderness present. Normal range of motion. Cervical back: Normal range of motion. Comments: Left knee edema with significant effusion noted on exam. Tenderness to palpation to the lateral patellar border. Decreased ROM with active and passive movement of the left knee with flexion and extension. Skin:     General: Skin is warm and dry. Neurological:      General: No focal deficit present. Mental Status: He is alert and oriented to person, place, and time. Mental status is at baseline. Comments: CN II-XII intact. No focal neurologic deficits noted on exam.  5/5 strength in all extremities. Neurovascularly intact with normal sensation and motor function. ED Medications  Medications   bupivacaine (PF) (MARCAINE) 0.5 % injection 30 mL (30 mL Infiltration Given by Other 9/21/23 1317)   methylPREDNISolone acetate (DEPO-MEDROL) injection 40 mg (40 mg Intra-articular Given by Other 9/21/23 0621)       Diagnostic Studies  Results Reviewed     Procedure Component Value Units Date/Time    Body fluid culture and Gram stain [[id number]] Collected: 09/21/23 5242    Lab Status: Final result Specimen:  Body Fluid Updated: 09/24/23 0709     Body Fluid Culture, Sterile No growth     Gram Stain Result 2+ Polys      No bacteria seen    Synovial fluid, crystal [[id number]] Collected: 09/21/23 0752    Lab Status: Final result Specimen: Synovial Fluid Updated: 09/21/23 1419     Crystals, Synovial Fluid Positive for Calcium Pyrophosphate Crystals    Synovial Fluid Diff [[id number]] Collected: 09/21/23 0752    Lab Status: Final result Specimen: Synovial Fluid Updated: 09/21/23 0946     Total Counted 100     Neutrophil % Synovial 99 %      Lymph % Synovial 1 %      Pathology Review No    Synovial fluid white cell count w/ diff [[id number]]  (Abnormal) Collected: 09/21/23 0752    Lab Status: Final result Specimen: Synovial Fluid Updated: 09/21/23 0835     Site Synovial     Color, Fluid Red     Clarity, Fluid Cloudy     WBC, Fluid 2,823 /ul                  XR knee 4+ views LEFT   Final Result by Prakash Desir MD (09/21 0845)      No acute osseous abnormality. Advanced tricompartmental left knee osteoarthritis as described. Large suprapatellar effusion. 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 According to the electronic health record, the patient was discharged with orders for lower extremity vascular ultrasound and referral to orthopedic surgery. This examination demonstrates a finding requiring imaging follow-up and was logged as such in    [email or web address]. Workstation performed: [id number]         VAS lower limb arterial duplex, complete bilateral    (Results Pending)         Procedures  Arthrocentesis    Date/Time: 9/21/2023 7:30 AM    Performed by: Sushil Adam MD  Authorized by: Sushil Adam MD  Universal Protocol:  Procedure performed by: (Dr. Ivett Becker)  Consent: Verbal consent obtained.   Risks and benefits: risks, benefits and alternatives were discussed  Consent given by: patient  Patient understanding: patient states understanding of the procedure being performed  Patient consent: the patient's understanding of the procedure matches consent given  Site marked: the operative site was marked  Required items: required blood products, implants, devices, and special equipment available  Patient identity confirmed: verbally with patient      Location:  Bedside and ED  Indications:  Joint swelling, pain, possible septic joint, diagnostic evaluation and therapeutic  Body area:  Knee  Joint:  Left knee  Local anesthesia used?: Yes    Anesthesia:  Local infiltration  Local anesthetic:  Bupivacaine 0.5% without epinephrine  Anesthetic total (ml):  20  Preparation: Patient was prepped and draped in usual sterile fashion    Needle size:  18 G  Ultrasound guidance: No    Approach:  Medial  Aspirate amount (ml):  70  Aspirate:  Bloody and cloudy  Methylprednisolone amount (mg):  40  Patient tolerance:  Patient tolerated the procedure well with no immediate complications          ED Course                                       Medical Decision Making  62year old Male with PMH of HTN, HLD, and Gout presented to the ED for left knee pain and swelling which started yesterday. Patient stated having difficulty ambulating at work due to the pain he was experiencing. Patient's labs and imaging was obtained and reviewed by the provider. His 4 view left knee x rays showed no acute osseous abnormality with advanced tricompartmental left knee osteoarthritis. 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 Patient's left knee was cleaned and prepped for joint aspiration via arthrocentesis. His knee was anesthetized with Bupivacaine 0.5% and was drained of approximately 70 mL of serosanguinous fluid with cloudy particulates. Following aspiration, the knee was injected with 40 mg of methylprednisolone. Patient's knee samples were sent to the lab and showed calcium pyrophosphate crystals.  He was planned for discharge and advised to follow up outpatient with his PCP and was provided an ambulatory referral to both Orthopedic surgery and a vascular duplex study of his legs. Patient was also instructed to return to the ED if his symptoms worsen including but not limited to fever, nausea or vomiting, purulent drainage from his knee, inability to ambulate, numbness or tingling in his left leg, or changes in his behavior. Amount and/or Complexity of Data Reviewed  Labs: ordered. Radiology: ordered. Risk  Prescription drug management. Disposition  Final diagnoses:   Effusion of left knee   Bilateral calf pain     Time reflects when diagnosis was documented in both MDM as applicable and the Disposition within this note     Time User Action Codes Description Comment    9/21/2023  8:22 AM Cheryl Morrell Add [M25.462] Effusion of left knee     9/21/2023  8:23 AM Skylar Gandara [Y17.046,  M79.662] Bilateral calf pain       ED Disposition     ED Disposition   Discharge    Condition   Stable    Date/Time   Thu Sep 21, 2023  8:15 AM    Comment   Mona Arzate discharge to home/self care. Follow-up Information     Follow up With Specialties Details Why [address], DO Orthopedic Surgery Call  As needed [address]  [phone]            Discharge Medication List as of 9/21/2023  8:27 AM      CONTINUE these medications which have NOT CHANGED    Details   ascorbic acid (VITAMIN C) 250 MG CHEW Chew 250 mg daily Taking 2 daily, Historical Med      Cholecalciferol (Vitamin D) 50 MCG (2000 UT) tablet Take 2,000 Units by mouth daily, Historical Med      losartan (COZAAR) 100 MG tablet TAKE 1 TABLET BY MOUTH EVERY DAY, Normal      nicotine (NICODERM CQ) 21 mg/24 hr TD 24 hr patch Place 1 patch on the skin every 24 hours, Starting Thu 12/29/2022, Normal      rosuvastatin (CRESTOR) 10 MG tablet TAKE 1 TABLET BY MOUTH EVERY DAY, Normal           Outpatient Discharge Orders   Ambulatory Referral to Orthopedic Surgery   Standing Status: Future Standing Exp.  Date: 09/21/24      VAS lower limb arterial duplex, complete bilateral   Standing Status: Future Standing Exp. Date: 09/21/27       PDMP Review     None           ED Provider  Attending physically available and evaluated Merlene Acevedo. I managed the patient along with the ED Attending.     Electronically Signed by         Holger Nam MD  09/24/23 8417

## 2023-09-21 NOTE — RESULT ENCOUNTER NOTE
Patient called at [phone]. Name and date of birth used as positive patient identifiers. Made aware of x-ray findings. Discussed importance of follow-up with family physician and orthopedics in addition to evaluation for possible popliteal aneurysm.

## 2023-09-24 LAB
BACTERIA SPEC BFLD CULT: NO GROWTH
GRAM STN SPEC: NORMAL
GRAM STN SPEC: NORMAL

## 2023-09-26 ENCOUNTER — OFFICE VISIT (OUTPATIENT)
Dept: FAMILY MEDICINE CLINIC | Facility: CLINIC | Age: 58
End: 2023-09-26
Payer: COMMERCIAL

## 2023-09-26 VITALS
HEIGHT: 71 IN | HEART RATE: 81 BPM | OXYGEN SATURATION: 97 % | BODY MASS INDEX: 31.95 KG/M2 | TEMPERATURE: 96.5 F | DIASTOLIC BLOOD PRESSURE: 88 MMHG | SYSTOLIC BLOOD PRESSURE: 120 MMHG | RESPIRATION RATE: 16 BRPM | WEIGHT: 228.2 LBS

## 2023-09-26 DIAGNOSIS — M79.661 BILATERAL CALF PAIN: ICD-10-CM

## 2023-09-26 DIAGNOSIS — Z72.0 TOBACCO ABUSE: ICD-10-CM

## 2023-09-26 DIAGNOSIS — I73.9 CLAUDICATION OF BOTH LOWER EXTREMITIES (HCC): ICD-10-CM

## 2023-09-26 DIAGNOSIS — I72.4 ANEURYSM OF RIGHT POPLITEAL ARTERY (HCC): ICD-10-CM

## 2023-09-26 DIAGNOSIS — M11.269 PSEUDOGOUT OF KNEE, UNSPECIFIED LATERALITY: Primary | ICD-10-CM

## 2023-09-26 DIAGNOSIS — M79.662 BILATERAL CALF PAIN: ICD-10-CM

## 2023-09-26 PROCEDURE — 99214 OFFICE O/P EST MOD 30 MIN: CPT | Performed by: FAMILY MEDICINE

## 2023-09-26 NOTE — PROGRESS NOTES
Name: Pily Alegria      : 1965      MRN: [id number]  Encounter Provider: Monica Phillips MD  Encounter Date: 2023   Encounter department: [address] [place] [address]  today with his wife for ER follow-up. Patient was recently evaluated for acute left knee pain and swelling. Suprapatellar effusion was seen on x-ray as well as advanced tricompartmental osteoarthritis in 2.5 cm popliteal artery aneurysm with vascular calcification. Knee was aspirated and sent to the lab. Results showed calcium pyrophosphate consistent with pseudogout. Left knee pain has improved since the joint aspiration. He continues to complain of bilateral calf pain. Denies any weakness or numbness in the feet. Has been experiencing symptoms suggestive of claudication. Smokes 1 pack/day for almost 30 years. NRT patches were not covered. Due to concerns for DVT and PAD, I have ordered a arterial and venous study. Strongly urged patient to quit smoking. We had a long discussion about setting a quit date, trying nicotine gum, and complication of smoking/ effects on vasculature. Agreed to cut down to half a pack in 2 weeks. Depending on the vascular study, we will discuss adding aspirin. Already on ASA. 1. Pseudogout of knee, unspecified laterality    2. Bilateral calf pain  -     VAS lower limb venous duplex study, complete bilateral; Future; Expected date: 2023  -     VAS lower limb arterial duplex, complete bilateral; Future; Expected date: 2023    3. Claudication of both lower extremities (HCC)  -     VAS lower limb arterial duplex, complete bilateral; Future; Expected date: 2023    4. Tobacco abuse  -     VAS lower limb arterial duplex, complete bilateral; Future; Expected date: 2023    5.  Aneurysm of right popliteal artery (HCC)  -     VAS lower limb arterial duplex, complete bilateral; Future; Expected date: 2023           Subjective 40-year-old male with a history of tobacco use for over 30 years, hypertension, and gout seen today for ER follow-up. ER records reviewed. Patient presents to the ED with pain and swelling in the left knee that started 1 day prior. History of surgery of the left knee requiring screw placement. Patient is a  and frequently climbs in and out of the truck. He was noted to have swelling above the patella which was aspirated. Synovial fluid was found to be positive for pseudogout. 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 Pain in the knee has improved but continues to experience pain in both calfs. This is unchanged from the ER. Venous ultrasound were not collected. Arterial study was ordered by the ER and was told to follow-up with PCP regarding the aneurysm. As mentioned above, patient has a long history of tobacco use. Currently smokes 1 pack/day. Patches were ordered in the past but not covered by insurance. Patient is willing to quit and is asking for assistance      Review of Systems   Constitutional: Negative for fever. Respiratory: Negative for cough, chest tightness and shortness of breath. Cardiovascular: Negative for chest pain and leg swelling. Musculoskeletal: Positive for gait problem. B/L calf pain and posterior right knee pain   Leg fatigue    Skin: Negative for color change, pallor and rash.        Current Outpatient Medications on File Prior to Visit   Medication Sig   • ascorbic acid (VITAMIN C) 250 MG CHEW Chew 250 mg daily Taking 2 daily   • Cholecalciferol (Vitamin D) 50 MCG (2000 UT) tablet Take 2,000 Units by mouth daily   • losartan (COZAAR) 100 MG tablet TAKE 1 TABLET BY MOUTH EVERY DAY   • rosuvastatin (CRESTOR) 10 MG tablet TAKE 1 TABLET BY MOUTH EVERY DAY   • nicotine (NICODERM CQ) 21 mg/24 hr TD 24 hr patch Place 1 patch on the skin every 24 hours (Patient not taking: Reported on 6/1/2023)       Objective     /88 (BP Location: Left arm, Patient Position: Sitting, Cuff Size: Large)   Pulse 81   Temp (!) 96.5 °F (35.8 °C) (Tympanic)   Resp 16   Ht 5' 10.5" (1.791 m)   Wt 104 kg (228 lb 3.2 oz)   SpO2 97%   BMI 32.28 kg/m²     Physical Exam  Constitutional:       General: He is not in acute distress. Appearance: Normal appearance. He is not ill-appearing or toxic-appearing. HENT:      Head: Normocephalic and atraumatic. Eyes:      Extraocular Movements: Extraocular movements intact. Cardiovascular:      Rate and Rhythm: Normal rate and regular rhythm. Pulses:           Dorsalis pedis pulses are 2+ on the right side and 2+ on the left side. Posterior tibial pulses are 2+ on the right side and 2+ on the left side. Heart sounds: No murmur heard. Pulmonary:      Effort: Pulmonary effort is normal.      Breath sounds: Normal breath sounds. Musculoskeletal:         General: Tenderness (posterior calf and knee ( bilaterally) ) present. No swelling. Right lower leg: No edema. Left lower leg: No edema. Comments: Engorges right veins below the knee   Calf tenderness    Skin:     General: Skin is warm. Capillary Refill: Capillary refill takes 2 to 3 seconds. Coloration: Skin is not pale. Findings: No erythema or rash. Neurological:      Mental Status: He is alert and oriented to person, place, and time. Psychiatric:         Mood and Affect: Mood normal.         Behavior: Behavior normal.         Thought Content:  Thought content normal.       Arun Hamilton MD

## 2023-09-27 ENCOUNTER — HOSPITAL ENCOUNTER (OUTPATIENT)
Dept: VASCULAR ULTRASOUND | Facility: HOSPITAL | Age: 58
Discharge: HOME | End: 2023-09-27
Attending: FAMILY MEDICINE
Payer: COMMERCIAL

## 2023-09-27 ENCOUNTER — TELEPHONE (OUTPATIENT)
Age: 58
End: 2023-09-27

## 2023-09-27 ENCOUNTER — TELEPHONE (OUTPATIENT)
Dept: FAMILY MEDICINE CLINIC | Facility: CLINIC | Age: 58
End: 2023-09-27

## 2023-09-27 DIAGNOSIS — I72.4 ANEURYSM OF RIGHT POPLITEAL ARTERY (HCC): ICD-10-CM

## 2023-09-27 DIAGNOSIS — I72.4 ANEURYSM ARTERY, FEMORAL (HCC): ICD-10-CM

## 2023-09-27 DIAGNOSIS — I82.441 ACUTE DEEP VEIN THROMBOSIS (DVT) OF TIBIAL VEIN OF RIGHT LOWER EXTREMITY (HCC): Primary | ICD-10-CM

## 2023-09-27 DIAGNOSIS — M79.661 BILATERAL CALF PAIN: ICD-10-CM

## 2023-09-27 DIAGNOSIS — M79.662 BILATERAL CALF PAIN: ICD-10-CM

## 2023-09-27 DIAGNOSIS — I73.9 CLAUDICATION OF BOTH LOWER EXTREMITIES (HCC): ICD-10-CM

## 2023-09-27 PROCEDURE — 93970 EXTREMITY STUDY: CPT

## 2023-09-27 PROCEDURE — 93970 EXTREMITY STUDY: CPT | Performed by: SURGERY

## 2023-09-27 NOTE — TELEPHONE ENCOUNTER
Spoke with wife and patient. Reviewed U/S result. Start eliquis starter russell. Adequate kidney function. Already on Statin. refer to vascular and schedule arterial study. Driving to [place] over the weekend which i explained the risk.  They are considering cancelling the vacation

## 2023-10-02 NOTE — ED ATTENDING ATTESTATION
9/21/2023  I, Ky Cintron MD, saw and evaluated the patient. I have discussed the patient with the resident/non-physician practitioner and agree with the resident's/non-physician practitioner's findings, Plan of Care, and MDM as documented in the resident's/non-physician practitioner's note, except where noted. All available labs and Radiology studies were reviewed. I was present for key portions of any procedure(s) performed by the resident/non-physician practitioner and I was immediately available to provide assistance. At this point I agree with the current assessment done in the Emergency Department.   I have conducted an independent evaluation of this patient a history and physical is as follows:see h and p above     ED Course  ED Course as of 10/02/23 1315   Thu Sep 21, 2023   0812 LEFT KNEE-  HARDWARE INTACT- DJD--           Critical Care Time  Procedures

## 2023-10-10 ENCOUNTER — OFFICE VISIT (OUTPATIENT)
Dept: OBGYN CLINIC | Facility: CLINIC | Age: 58
End: 2023-10-10
Payer: COMMERCIAL

## 2023-10-10 VITALS
SYSTOLIC BLOOD PRESSURE: 139 MMHG | HEART RATE: 62 BPM | WEIGHT: 227.2 LBS | HEIGHT: 71 IN | DIASTOLIC BLOOD PRESSURE: 98 MMHG | BODY MASS INDEX: 31.81 KG/M2

## 2023-10-10 DIAGNOSIS — M25.462 EFFUSION OF LEFT KNEE: ICD-10-CM

## 2023-10-10 DIAGNOSIS — M17.12 PRIMARY OSTEOARTHRITIS OF LEFT KNEE: Primary | ICD-10-CM

## 2023-10-10 PROCEDURE — 99204 OFFICE O/P NEW MOD 45 MIN: CPT | Performed by: STUDENT IN AN ORGANIZED HEALTH CARE EDUCATION/TRAINING PROGRAM

## 2023-10-10 NOTE — ASSESSMENT & PLAN NOTE
Patient is a Middle-Aged (Approx 42-67yo) male with with pain at rest or at night  , functional instability, no mechanical symptoms  and identified modifiable risk factors. Radiographic evaluation demonstrates severe degenerative changes in all compartments. Physical exam reveals fixed varus and >5 flexion contracture. Given these findings, I recommend:    -I believe that the patient would benefit from a TKA, but he would like more time to think about surgery. During that time, in encouraged him to consider how much the knee affects his daily activities   -WBAT  -Activity modification to limit strain or impact on the joint  -Tylenol as needed. Do not exceed 3000mg daily  -Supervised physical therapy. Script provided   -Home exercise program directed by PT  -Weight loss discussed   -Knee sleeve or brace for comfort  -Cane or walker recommended to offload joint  -Corticosteroid injection was offered, accepted, and administered. Risk benefits and alternatives were discussed prior to injection.   Patient tolerated the procedure well.  -Patient may follow up in 3 month(s) for further evaluation and treatment

## 2023-10-10 NOTE — PROGRESS NOTES
Date: 10/10/23  Alyse Breaux   MRN# [id number]  : 1965      Chief Complaint: Left knee pain    Assessment and Plan:    Primary osteoarthritis of left knee  Patient is a Middle-Aged (Approx 42-69yo) male with with pain at rest or at night  , functional instability, no mechanical symptoms  and identified modifiable risk factors. Radiographic evaluation demonstrates severe degenerative changes in all compartments. Physical exam reveals fixed varus and >5 flexion contracture. Given these findings, I recommend:    -I believe that the patient would benefit from a TKA, but he would like more time to think about surgery. During that time, in encouraged him to consider how much the knee affects his daily activities   -WBAT  -Activity modification to limit strain or impact on the joint  -Tylenol as needed. Do not exceed 3000mg daily  -Supervised physical therapy. Script provided   -Home exercise program directed by PT  -Weight loss discussed   -Knee sleeve or brace for comfort  -Cane or walker recommended to offload joint  -Corticosteroid injection was offered, accepted, and administered. Risk benefits and alternatives were discussed prior to injection. Patient tolerated the procedure well.  -Patient may follow up in 3 month(s) for further evaluation and treatment            Subjective:     Knee Pain  Patient complains of left knee pain. This is evaluated as a personal injury. The pain began several years ago. Patient has a history of left knee ACL reconstruction initially performed in the  and then subsequently revised. He also presented to the emergency department 2 weeks ago for acute onset knee pain and swelling. During that visit the knee was aspirated and a steroid injection was administered. Patient states that the steroid injection has provided some pain relief. The pain is located global.  He describes the symptoms as aching, dull and throbbing.  Symptoms improve with rest, ice, the use of a brace/sleeve, avoiding painful activities, cortisone injection. The symptoms are worse with activity, deep knee bending, getting up from a chair, weight bearing, sitting for prolonged periods of time. The knee has given out or felt unstable. The patient cannot bend and straighten the knee fully. The patient works as a  and feels that the knee significantly impacts his daily work. Treatment to date has been ice, Tylenol, knee sleeve/brace, cortisone injection, without significant relief. Allergy:  No Known Allergies  Medications:  all current active meds have been reviewed  Past Medical History:  Past Medical History:   Diagnosis Date   • Hypertension    • Psoriasis    • Sleep apnea      Past Surgical History:  Past Surgical History:   Procedure Laterality Date   • KNEE SURGERY Bilateral      Family History:  Family History   Problem Relation Age of Onset   • Hypertension Mother    • No Known Problems Father      Social History:  Social History     Substance and Sexual Activity   Alcohol Use Yes    Comment: rare social use one time per month     Social History     Substance and Sexual Activity   Drug Use Never     Social History     Tobacco Use   Smoking Status Every Day   • Packs/day: 1.00   • Years: 32.00   • Total pack years: 32.00   • Types: Cigarettes   Smokeless Tobacco Never   Tobacco Comments    1 PPD x 15 years - As per [place]            ROS:   Review of Systems   All other systems reviewed and are negative. Objective:   BP Readings from Last 1 Encounters:   10/10/23 139/98      Wt Readings from Last 1 Encounters:   10/10/23 103 kg (227 lb 3.2 oz)      Pulse Readings from Last 1 Encounters:   10/10/23 62      BMI: Estimated body mass index is 32.14 kg/m² as calculated from the following:    Height as of this encounter: 5' 10.5" (1.791 m). Weight as of this encounter: 103 kg (227 lb 3.2 oz). Physical Exam  Constitutional:       General: He is not in acute distress.   HENT: Head: Normocephalic and atraumatic. Eyes:      Conjunctiva/sclera: Conjunctivae normal.   Cardiovascular:      Comments: Extremities well perfused   No LE edema    Pulmonary:      Effort: Pulmonary effort is normal.   Neurological:      Mental Status: He is alert. Mental status is at baseline. Gait and Station:   antalgic    Left Knee: Inspection:  normal color, temperature, turgor and moisture. Well-healed surgical incisions. Overall limb alignment: varus, fixed    Effusion: no    ROM 5 to 120 with pain    Extensor Lag: Absent    Palpation: Medial and Lateral joint line tenderness to palpation    stable to AP translation at 90 deg    Coronal plane stable in full extension    Coronal plane stable in mid-flexion     Motor: 5/5 EHL/FHL/TA/GS/Qd/Hs    Vascular: Toes WWP with BCR    Sensory: SILT DP/SP/Susan/Saph/Tib      Images:    I personally reviewed relevant images in the PACS system and my interpretation is as follows:  X-rays of the left knee reveal end stage degenerative changes with subchondral cysts, subchondral sclerosis, joint space narrowing and osteophyte formation. Evidence of previous ACL reconstruction with interference screws and staple present. No evidence of migration or osteolysis.         Grupo Leonardo MD  Adult Reconstruction Specialist   [facility]

## 2023-10-27 NOTE — PATIENT INSTRUCTIONS
Calf DVT  -Continue with Eliquis  -Compression stockings      PAD  -Rosuvastatin and Eliquis  -Walking  -Protect feet daily; avoid wounds  -Continue with Crestor and Eliquis      Tobacco addition  -smoking cessation

## 2023-10-30 ENCOUNTER — CONSULT (OUTPATIENT)
Dept: VASCULAR SURGERY | Facility: CLINIC | Age: 58
End: 2023-10-30
Payer: COMMERCIAL

## 2023-10-30 ENCOUNTER — APPOINTMENT (OUTPATIENT)
Dept: LAB | Facility: CLINIC | Age: 58
End: 2023-10-30
Payer: COMMERCIAL

## 2023-10-30 ENCOUNTER — TELEPHONE (OUTPATIENT)
Dept: VASCULAR SURGERY | Facility: CLINIC | Age: 58
End: 2023-10-30

## 2023-10-30 VITALS
SYSTOLIC BLOOD PRESSURE: 142 MMHG | HEIGHT: 71 IN | HEART RATE: 62 BPM | WEIGHT: 229 LBS | BODY MASS INDEX: 32.06 KG/M2 | DIASTOLIC BLOOD PRESSURE: 86 MMHG

## 2023-10-30 DIAGNOSIS — I72.4 ANEURYSM OF RIGHT POPLITEAL ARTERY (HCC): ICD-10-CM

## 2023-10-30 DIAGNOSIS — I82.441 ACUTE DEEP VEIN THROMBOSIS (DVT) OF TIBIAL VEIN OF RIGHT LOWER EXTREMITY (HCC): ICD-10-CM

## 2023-10-30 DIAGNOSIS — I72.4 ANEURYSM ARTERY, FEMORAL (HCC): ICD-10-CM

## 2023-10-30 DIAGNOSIS — I73.9 CLAUDICATION OF BOTH LOWER EXTREMITIES (HCC): ICD-10-CM

## 2023-10-30 LAB
ANION GAP SERPL CALCULATED.3IONS-SCNC: 6 MMOL/L
BUN SERPL-MCNC: 15 MG/DL (ref 5–25)
CALCIUM SERPL-MCNC: 9.7 MG/DL (ref 8.4–10.2)
CHLORIDE SERPL-SCNC: 105 MMOL/L (ref 96–108)
CO2 SERPL-SCNC: 28 MMOL/L (ref 21–32)
CREAT SERPL-MCNC: 1 MG/DL (ref 0.6–1.3)
GFR SERPL CREATININE-BSD FRML MDRD: 82 ML/MIN/1.73SQ M
GLUCOSE P FAST SERPL-MCNC: 86 MG/DL (ref 65–99)
POTASSIUM SERPL-SCNC: 4.2 MMOL/L (ref 3.5–5.3)
SODIUM SERPL-SCNC: 139 MMOL/L (ref 135–147)

## 2023-10-30 PROCEDURE — 99204 OFFICE O/P NEW MOD 45 MIN: CPT | Performed by: PHYSICIAN ASSISTANT

## 2023-10-30 PROCEDURE — 36415 COLL VENOUS BLD VENIPUNCTURE: CPT

## 2023-10-30 PROCEDURE — 80048 BASIC METABOLIC PNL TOTAL CA: CPT

## 2023-10-30 NOTE — PROGRESS NOTES
Assessment/Plan:    Acute deep vein thrombosis (DVT) of tibial vein of right lower extremity ([address])  -     Ambulatory Referral to Vascular Surgery  -     Compression Stocking  -     apixaban (Eliquis) 5 mg; Take 1 tablet (5 mg total) by mouth 2 (two) times a day  -Incidentally noted R calf DVT in setting of L knee pain/swelling  -Mild LE edema during the summer  -Recently placed on apixaban for DVT but ran out of Rx    -LEV 9/27/23: R acute v subacute occlusive DVT 1/2 PT and soleal veins. Note: R Occluded popliteal and distal SFA. Distal SFA 2.5 cm and popliteal 3 cm. CFA ectatic 1.8 cm              L Distal SFA 2.3 cm aneurysm, popliteal 2.1 cm aneurysm and CFA 1.8 cm    -R LE DVT; calf non-tender; no significant leg swelling or pain  -We will plan to continue with apixaban 5 twice daily x at least 3 months  -Etiology of DVT unclear  -May require hematology evaluation to further evaluate   -Tubigrip placed in the office  -Compression stockings once PAD is worked up  -We discussed reasons to call or go to the ED  -Follow up on this problem in about 2-3 months      Aneurysm artery, femoral ([address])  Aneurysm of right popliteal artery ([address])  -     Ambulatory Referral to Vascular Surgery  -     Basic metabolic panel;  Future  -     CTA abdominal w run off w wo contrast; Future    Bilateral lower extremity aneurysms  R occluded popliteal and distal SFA aneurysms (R popl 3 cm, dSFA 2.5 cm); R CFA ectatic 1.8 cm  L Popliteal and distal SFA aneurysm (L pop 2.1 cm, dSFA 2.3 cm); L CFA ectatic 1.8 cm    -Check CTA to further evaluate aneurysms and claudication/PAD  -Continue with aspirin / statin therapy       Atherosclerosis of the lower extremities with claudication of both lower extremities ([address])  -     Ambulatory Referral to Vascular Surgery  -     VAS lower limb arterial duplex, complete bilateral; Future  -     CTA abdominal w run off w wo contrast; Future    -Short distance claudication with calf tightness about 20 feet  -Symptoms begin at R calf and radiate up the lateral R thigh; quickly follows L calf claudication  -No ischemic rest pain, discoloration, coldness or tissue loss    -Exam: 2+ femoral pulses; R no easily palp DP, L 1+ DP; <3 sec cap refill; no tissue loss    Plan:  -Recommend regular walking as tolerated  -Optimize blood pressure, cholesterol and glucose control  -Continue with Eliquis 5 twice daily (recent calf DVT) and rosuvastatin 10  -We will consider risk-benefit of EC aspirin and likely should have intensified statin to 20 (defer)  -We will check BRAD to evaluate atherosclerosis and physiologic testing  -Check CTA to delineate anatomy, evaluate PAD and aneurysms  -Follow-up with vascular surgeon for additional recommendations      Tobacco addiction  -Smoking cessation discussed  -He has been cutting back from a pack down to 5 cigarettes  -Strongly recommend complete smoking cessation      Subjective:      Patient ID: Alyse Breaux is a 62 y.o. male. New patient presents for review of LEV done 9/27/23. Pt was seen in hospital 9/21/23 with bilateral calf pain. He still reports pain today in both legs but denies redness or swelling. Pt has not been wearing compression or elevating his legs recently. He reports he ran out of AppSurferquis Friday. Pt is taking Rosuvastatin. He smokes 1/4ppd. HPI   Mr. Alyse Breaux 59-year-old male smoker with hypertension and hyperlipidemia who is referred to vascular surgery for aneurysmal disease and claudication. Patient reports that he was seen on 9/21/2023 in the emergency department for left knee pain and marked swelling. He had the knee drained in the emergency department with aspirate significant for pseudogout. he followed up with his primary care physician and had discussed recent symptoms of leg swelling and calf claudication for which patient was referred for noninvasive studies.   He underwent venous duplex study significant for right calf acute versus subacute occlusive DVT in one of the posterior tibial veins and soleal veins. He was placed on full anticoagulation but ran out of apixaban 3 days ago. He also gives history of calf claudication. During the past couple of months, he has developed calf pain with activity. He walks 20 feet but feels like he is walked "20 miles."  In general, the legs are achy even at rest.  He has some tingling in the feet at night, but no pain. He has no discoloration or tissue loss. Patient reports that he works as a  locally. He is in and out of the truck regularly. He does notice swelling in the leg at the end of the day, this was particularly apparent during the hot summer. He did try a compression stocking but felt that left foot turned purple so has not worn them. Medications include apixaban 5 twice daily (ran out of this 3 days ago) and atorvastatin 10. May need intensified statin therapy. He was on aspirin but apparently this was discontinued when he was placed on apixaban. No person Hx malignancies. Colonoscopy a couple of years ago and had polyps removed and is due for another colonoscopy. No history of known cardiovascular disease, MI, stroke. Family history is limited. -Bilateral calf claudication R>L  over the past 2 months.  -R acute v subacute occlusive DVT 1/2 PT and soleal veins.  1/2023; placed on rosuvastatin 10      LEV 9/27/23  CLINICAL:  Indications: Patient presents with worsening bilateral calf pain over the past  2 months that is worse with walking (right > left). Patient is a   and recent X-ray showed evidence of left popliteal aneurysm. Operative History:  Patient denies any cardiovascular procedures  Risk Factors  The patient has history of HTN and smoking (current) 1-2 ppd.      FINDINGS:     Right       Impression           Thrombus             PostTibial  Occlusive Segmental  Acute - Occlusive    Calf Veins  Occlusive Segmental  Acute - Occlusive          CONCLUSION:     Impression:  RIGHT LOWER LIMB:  Evidence of acute vs subacute, occlusive deep vein thrombosis noted in one of  the posterior tibial veins and the soleal vein. No evidence of superficial thrombophlebitis noted. Doppler evaluation shows a normal response to augmentation maneuvers. Note: Evidence of occluded popliteal and distal superficial femoral artery  aneurysm. The distal SFA, 2.5 cm and the popliteal artery, 3.0 cm. The common  femoral artery is ectatic, 1.8 cm. Posterior tibial and anterior tibial arterial Doppler waveform's are  monophasic/diminished. LEFT LOWER LIMB:  No evidence of acute or chronic deep vein thrombosis. No evidence of superficial thrombophlebitis noted. Doppler evaluation shows a normal response to augmentation maneuvers. Note: Evidence of popliteal and distal superficial femoral artery aneurysm. The  distal SFA, 2.3 cm and the popliteal artery, 2.1 cm. The common femoral artery  is ectatic, 1.8 cm. Popliteal, posterior tibial and anterior tibial arterial Doppler waveform's are  triphasic/biphasic. Technical findings were given to Dr. Juana Millan via tiger text. The following portions of the patient's history were reviewed and updated as appropriate: allergies, current medications, past family history, past medical history, past social history, past surgical history, and problem list.    Review of Systems   Musculoskeletal:         BLE calf pain - constant   All other systems reviewed and are negative. Objective:      /86 (BP Location: Left arm, Patient Position: Sitting, Cuff Size: Standard)   Pulse 62   Ht 5' 10.5" (1.791 m)   Wt 104 kg (229 lb)   BMI 32.39 kg/m²     Nonpalpable, nontender Ao    B 2+ femoral pulses     R no easily palp DP, L 1+ DP;      Toes are a bit cooler than or proximal foot; <3 sec cap refill; no tissue loss    Trace ankle swelling    Calves soft, nontender     Physical Exam  Vitals and nursing note reviewed. Constitutional:       Appearance: He is well-developed. HENT:      Head: Normocephalic and atraumatic. Eyes:      Pupils: Pupils are equal, round, and reactive to light. Neck:      Thyroid: No thyromegaly. Vascular: No carotid bruit or JVD. Cardiovascular:      Rate and Rhythm: Normal rate and regular rhythm. Pulses:           Carotid pulses are 2+ on the right side and 2+ on the left side. Radial pulses are 2+ on the right side and 2+ on the left side. Femoral pulses are 2+ on the right side and 2+ on the left side. Dorsalis pedis pulses are 0 on the right side and 1+ on the left side. Posterior tibial pulses are 0 on the right side. Heart sounds: S1 normal and S2 normal. Murmur heard. Systolic murmur is present with a grade of 2/6. No friction rub. No gallop. Pulmonary:      Effort: Pulmonary effort is normal. No accessory muscle usage or respiratory distress. Breath sounds: Normal breath sounds. No wheezing or rales. Abdominal:      General: Bowel sounds are normal. There is no distension. Palpations: Abdomen is soft. Tenderness: There is no abdominal tenderness. Musculoskeletal:         General: No deformity. Normal range of motion. Right lower leg: Edema present. Left lower leg: Edema present. Skin:     General: Skin is warm and dry. Capillary Refill: Capillary refill takes 2 to 3 seconds. Findings: No lesion or rash. Nails: There is no clubbing. Neurological:      Mental Status: He is alert and oriented to person, place, and time. Comments: Grossly normal    Psychiatric:         Behavior: Behavior is cooperative. I have reviewed and made appropriate changes to the review of systems input by the medical assistant.     Vitals:    10/30/23 0858   BP: 142/86   BP Location: Left arm   Patient Position: Sitting   Cuff Size: Standard   Pulse: 62 Weight: 104 kg (229 lb)   Height: 5' 10.5" (1.791 m)       Patient Active Problem List   Diagnosis    Essential hypertension    Insect bite of lower leg with local reaction, right, initial encounter    Leg swelling    Gout of left foot    Primary osteoarthritis of left knee       Past Surgical History:   Procedure Laterality Date    KNEE SURGERY Bilateral        Family History   Problem Relation Age of Onset    Hypertension Mother     No Known Problems Father        Social History     Socioeconomic History    Marital status: /Civil Union     Spouse name: Not on file    Number of children: Not on file    Years of education: Not on file    Highest education level: Not on file   Occupational History    Not on file   Tobacco Use    Smoking status: Every Day     Packs/day: 1.00     Years: 32.00     Total pack years: 32.00     Types: Cigarettes    Smokeless tobacco: Never    Tobacco comments:     1 PPD x 15 years - As per [employer] [facility]    Vaping Use: Never used   Substance and Sexual Activity    Alcohol use: Yes     Comment: rare social use one time per month    Drug use: Never    Sexual activity: Yes     Partners: Female   Other Topics Concern    Not on file   Social History Narrative    Current some day smoker - As per [employer]         · Most recent tobacco use screening:   10-      · Do you currently or have you served in the [address] [place] [address] [employer]:   No     As per [facility]      Social Determinants of Health     Financial Resource Strain: Not on file   Food Insecurity: Not on file   Transportation Needs: Not on file   Physical Activity: Not on file   Stress: Not on file   Social Connections: Not on file   Intimate Partner Violence: Not on file   Housing Stability: Not on file       No Known Allergies      Current Outpatient Medications:     ascorbic acid (VITAMIN C) 250 MG CHEW, Chew 250 mg daily Taking 2 daily, Disp: , Rfl:     losartan (COZAAR) 100 MG tablet, TAKE 1 TABLET BY MOUTH EVERY DAY, Disp: 90 tablet, Rfl: 1    rosuvastatin (CRESTOR) 10 MG tablet, TAKE 1 TABLET BY MOUTH EVERY DAY, Disp: 90 tablet, Rfl: 1    apixaban (Eliquis) 5 mg, Take 2 tablets (10 mg total) by mouth 2 (two) times a day for 7 days, THEN 1 tablet (5 mg total) 2 (two) times a day for 23 days. , Disp: 74 tablet, Rfl: 0    Cholecalciferol (Vitamin D) 50 MCG (2000 UT) tablet, Take 2,000 Units by mouth daily (Patient not taking: Reported on 10/30/2023), Disp: , Rfl:     nicotine (NICODERM CQ) 21 mg/24 hr TD 24 hr patch, Place 1 patch on the skin every 24 hours (Patient not taking: Reported on 6/1/2023), Disp: 28 patch, Rfl: 0

## 2023-11-06 ENCOUNTER — HOSPITAL ENCOUNTER (OUTPATIENT)
Dept: CT IMAGING | Facility: HOSPITAL | Age: 58
Discharge: HOME | End: 2023-11-06
Payer: COMMERCIAL

## 2023-11-06 DIAGNOSIS — I73.9 CLAUDICATION OF BOTH LOWER EXTREMITIES (HCC): ICD-10-CM

## 2023-11-06 DIAGNOSIS — I72.4 ANEURYSM OF RIGHT POPLITEAL ARTERY (HCC): ICD-10-CM

## 2023-11-06 PROCEDURE — G1004 CDSM NDSC: HCPCS

## 2023-11-06 PROCEDURE — 75635 CT ANGIO ABDOMINAL ARTERIES: CPT

## 2023-11-06 RX ADMIN — IOHEXOL 100 ML: 350 INJECTION, SOLUTION INTRAVENOUS at 08:08

## 2023-11-07 ENCOUNTER — HOSPITAL ENCOUNTER (OUTPATIENT)
Dept: VASCULAR ULTRASOUND | Facility: HOSPITAL | Age: 58
Discharge: HOME | End: 2023-11-07
Payer: COMMERCIAL

## 2023-11-07 DIAGNOSIS — I73.9 CLAUDICATION OF BOTH LOWER EXTREMITIES (HCC): ICD-10-CM

## 2023-11-07 PROCEDURE — 93925 LOWER EXTREMITY STUDY: CPT

## 2023-11-07 PROCEDURE — 93922 UPR/L XTREMITY ART 2 LEVELS: CPT | Performed by: SURGERY

## 2023-11-07 PROCEDURE — 93923 UPR/LXTR ART STDY 3+ LVLS: CPT

## 2023-11-07 PROCEDURE — 93925 LOWER EXTREMITY STUDY: CPT | Performed by: SURGERY

## 2023-11-09 ENCOUNTER — TELEPHONE (OUTPATIENT)
Dept: VASCULAR SURGERY | Facility: CLINIC | Age: 58
End: 2023-11-09

## 2023-11-09 NOTE — TELEPHONE ENCOUNTER
Received call from Renetta Grewal at [facility] to report that there are significant findings on the CTA abd w/ runoff from 11/6. Pt has OV 11/30 w/ Dr. Krystina Marcus to review the study. Please advise if anything further is needed at this time.

## 2023-11-11 DIAGNOSIS — I10 ESSENTIAL HYPERTENSION: ICD-10-CM

## 2023-11-13 RX ORDER — LOSARTAN POTASSIUM 100 MG/1
TABLET ORAL
Qty: 90 TABLET | Refills: 1 | Status: SHIPPED | OUTPATIENT
Start: 2023-11-13

## 2023-11-17 NOTE — PROGRESS NOTES
Assessment/Plan:    Popliteal artery aneurysm, bilateral [facility])  Patient with bilateral popliteal artery aneurysms. The right popliteal artery aneurysm is thrombosed with intermittent claudication. He does not have right lower extremity rest pain nor tissue loss. The left lower extremity does have a patent by lobar long segment popliteal artery aneurysm with maximal diameter 2.6 cm. With the nature of the long segment fusiform aneurysm on the left, I recommend exclusion and distal SFA to below-knee popliteal artery bypass. The patient will undergo vein mapping to determine conduit options. Infrarenal abdominal aortic aneurysm (AAA) without rupture (HCC)  Incidentally identified 4.4 cm asymptomatic infrarenal abdominal aortic aneurysm. I reviewed the rupture risk and surveillance plan for an aneurysm of this size. We also reviewed the symptoms of symptomatic aneurysm at length. He denies any symptoms currently and is willing to complete the surveillance evaluation. Subjective:      Patient ID: Tiffany Fuchs is a 62 y.o. male. Patient presents today to review BRAD and CTA abdomen. BLE aneurysms and calf claudication. HPI  The patient is a pleasant 60-year-old male smoker who has cut back from 1 pack/day to several, "borrowed cigarettes" per day. He has long suspected he had gout in his feet however no formal diagnosis. He is intermittently been on steroid treatments. In retrospect with identification of a thrombosed right popliteal artery aneurysm and a patent 2.6 cm left popliteal artery aneurysm his symptoms are more than likely due to embolic events from these 2 structures. The right side now thrombosed does not require treatment. I explained this to the patient and his wife. He currently complains of difficulty walking about half mile with claudication of the right lower extremity. He does not have any wounds or rest pain on the side.   The left side has a pulse in the foot and he has no symptoms whatsoever. We discussed the risk of embolic event and thrombosis of the left lower extremity due to the presence of the aneurysm. We also discussed at length his abdominal aortic aneurysm in size arterial for repair. He currently has no symptoms of his abdominal aortic aneurysm. He does occasionally have what sounds like blue toe or embolic phenomenon to the left lower extremity. This however was more prominent on the right prior to his thrombosis in all likelihood. Review of Systems   Constitutional: Negative. HENT: Negative. Eyes: Negative. Respiratory: Negative. Cardiovascular: Negative. Gastrointestinal: Negative. Endocrine: Negative. Genitourinary: Negative. Musculoskeletal: Negative. Skin: Negative. Allergic/Immunologic: Negative. Neurological: Negative. Hematological: Negative. Psychiatric/Behavioral: Negative. Objective:      /88 (BP Location: Left arm, Patient Position: Sitting)   Pulse 62   Ht 5' 10.5" (1.791 m)   Wt 103 kg (228 lb)   BMI 32.25 kg/m²          Physical Exam  Constitutional:       Appearance: Normal appearance. HENT:      Head: Normocephalic and atraumatic. Nose: Nose normal. No rhinorrhea. Eyes:      Extraocular Movements: Extraocular movements intact. Pupils: Pupils are equal, round, and reactive to light. Cardiovascular:      Rate and Rhythm: Normal rate and regular rhythm. Pulses:           Dorsalis pedis pulses are detected w/ Doppler on the right side and 2+ on the left side. Posterior tibial pulses are detected w/ Doppler on the right side. Pulmonary:      Effort: Pulmonary effort is normal.      Breath sounds: No stridor. Abdominal:      General: There is no distension. Tenderness: There is no abdominal tenderness. Musculoskeletal:         General: No tenderness. Normal range of motion. Cervical back: Normal range of motion and neck supple.    Skin: General: Skin is warm. Capillary Refill: Capillary refill takes less than 2 seconds. Coloration: Skin is not jaundiced. Neurological:      General: No focal deficit present. Mental Status: He is alert and oriented to person, place, and time. Psychiatric:         Mood and Affect: Mood normal.         Behavior: Behavior normal.         Operative Scheduling Information:    Hospital:  [address]    Physician:  Me    Surgery: Left above knee popliteal to below knee popliteal artery bypass    Urgency:  Standard    Level:  Level 4: Outpatients to be scheduled for screening procedures and elective surgery that can be delayed for longer than one month without reasonable expectation of detriment to patient.     Case Length:  Normal    Post-op Bed:  Stepdown    OR Table:  Standard    Equipment Needs:  None    Medication Instructions:  Aspirin:   Continue (do not hold)  Eliquis:  Hold for 2 days prior to procedure    Hydration:  No

## 2023-11-20 ENCOUNTER — TELEPHONE (OUTPATIENT)
Dept: ADMINISTRATIVE | Facility: HOSPITAL | Age: 58
End: 2023-11-20

## 2023-11-20 ENCOUNTER — TELEPHONE (OUTPATIENT)
Dept: VASCULAR SURGERY | Facility: CLINIC | Age: 58
End: 2023-11-20

## 2023-11-20 ENCOUNTER — OFFICE VISIT (OUTPATIENT)
Dept: VASCULAR SURGERY | Facility: CLINIC | Age: 58
End: 2023-11-20
Payer: COMMERCIAL

## 2023-11-20 VITALS
HEIGHT: 71 IN | DIASTOLIC BLOOD PRESSURE: 88 MMHG | BODY MASS INDEX: 31.92 KG/M2 | WEIGHT: 228 LBS | HEART RATE: 62 BPM | SYSTOLIC BLOOD PRESSURE: 136 MMHG

## 2023-11-20 DIAGNOSIS — I71.43 INFRARENAL ABDOMINAL AORTIC ANEURYSM (AAA) WITHOUT RUPTURE (HCC): ICD-10-CM

## 2023-11-20 DIAGNOSIS — I72.4 POPLITEAL ARTERY ANEURYSM, BILATERAL (HCC): Primary | ICD-10-CM

## 2023-11-20 PROCEDURE — 99215 OFFICE O/P EST HI 40 MIN: CPT | Performed by: SURGERY

## 2023-11-20 RX ORDER — CHLORHEXIDINE GLUCONATE ORAL RINSE 1.2 MG/ML
15 SOLUTION DENTAL ONCE
OUTPATIENT
Start: 2023-11-20 | End: 2023-11-20

## 2023-11-20 RX ORDER — ASPIRIN 81 MG/1
81 TABLET, CHEWABLE ORAL DAILY
Qty: 30 TABLET | Refills: 3 | Status: SHIPPED | OUTPATIENT
Start: 2023-11-20 | End: 2024-03-19

## 2023-11-20 NOTE — ASSESSMENT & PLAN NOTE
Patient with bilateral popliteal artery aneurysms. The right popliteal artery aneurysm is thrombosed with intermittent claudication. He does not have right lower extremity rest pain nor tissue loss. The left lower extremity does have a patent by lobar long segment popliteal artery aneurysm with maximal diameter 2.6 cm. With the nature of the long segment fusiform aneurysm on the left, I recommend exclusion and distal SFA to below-knee popliteal artery bypass. The patient will undergo vein mapping to determine conduit options.

## 2023-11-20 NOTE — ASSESSMENT & PLAN NOTE
Incidentally identified 4.4 cm asymptomatic infrarenal abdominal aortic aneurysm. I reviewed the rupture risk and surveillance plan for an aneurysm of this size. We also reviewed the symptoms of symptomatic aneurysm at length. He denies any symptoms currently and is willing to complete the surveillance evaluation.

## 2023-11-20 NOTE — TELEPHONE ENCOUNTER
Spoke to patient in office hours  to schedule CC Tent date SX       Operative Scheduling Information:     Hospital:  [address]     Physician:  Me     Surgery: Left above knee popliteal to below knee popliteal artery bypass     Urgency:  Standard     Level:  Level 4: Outpatients to be scheduled for screening procedures and elective surgery that can be delayed for longer than one month without reasonable expectation of detriment to patient.      Case Length:  Normal     Post-op Bed:  Stepdown     OR Table:  Standard     Equipment Needs:  None     Medication Instructions:  Aspirin:   Continue (do not hold)  Eliquis:  Hold for 2 days prior to procedure     Hydration:  No

## 2023-11-20 NOTE — PATIENT INSTRUCTIONS
1. Popliteal artery aneurysm, bilateral (HCC)  Assessment & Plan:  Patient with bilateral popliteal artery aneurysms. The right popliteal artery aneurysm is thrombosed with intermittent claudication. He does not have right lower extremity rest pain nor tissue loss. The left lower extremity does have a patent by lobar long segment popliteal artery aneurysm with maximal diameter 2.6 cm. With the nature of the long segment fusiform aneurysm on the left, I recommend exclusion and distal SFA to below-knee popliteal artery bypass. The patient will undergo vein mapping to determine conduit options. Orders:  -     Ambulatory Referral to Cardiology; Future  -     VAS lower limb vein mapping bypass graft; Future; Expected date: 11/20/2023    2. Infrarenal abdominal aortic aneurysm (AAA) without rupture [facility])  Assessment & Plan:  Incidentally identified 4.4 cm asymptomatic infrarenal abdominal aortic aneurysm. I reviewed the rupture risk and surveillance plan for an aneurysm of this size. We also reviewed the symptoms of symptomatic aneurysm at length. He denies any symptoms currently and is willing to complete the surveillance evaluation. Orders:  -     VAS abdominal aorta/iliacs; complete study;  Future; Expected date: 05/20/2024

## 2023-11-21 NOTE — TELEPHONE ENCOUNTER
Spoke to patients wife Rod Booker) to schedule vein mapping and abdominal duplex   Patient has CC 1-10-24 and Tent surgery is 1-16-24

## 2023-12-04 ENCOUNTER — OFFICE VISIT (OUTPATIENT)
Dept: FAMILY MEDICINE CLINIC | Facility: CLINIC | Age: 58
End: 2023-12-04
Payer: COMMERCIAL

## 2023-12-04 VITALS
HEART RATE: 68 BPM | RESPIRATION RATE: 16 BRPM | OXYGEN SATURATION: 96 % | BODY MASS INDEX: 32.48 KG/M2 | TEMPERATURE: 97.4 F | WEIGHT: 232 LBS | SYSTOLIC BLOOD PRESSURE: 152 MMHG | DIASTOLIC BLOOD PRESSURE: 86 MMHG | HEIGHT: 71 IN

## 2023-12-04 DIAGNOSIS — Z12.5 PROSTATE CANCER SCREENING: ICD-10-CM

## 2023-12-04 DIAGNOSIS — I10 ESSENTIAL HYPERTENSION: ICD-10-CM

## 2023-12-04 DIAGNOSIS — Z00.00 ANNUAL PHYSICAL EXAM: Primary | ICD-10-CM

## 2023-12-04 DIAGNOSIS — E78.5 MILD HYPERLIPIDEMIA: ICD-10-CM

## 2023-12-04 PROCEDURE — 99396 PREV VISIT EST AGE 40-64: CPT | Performed by: FAMILY MEDICINE

## 2023-12-04 NOTE — PROGRESS NOTES
ADULT ANNUAL PHYSICAL  [facility]    NAME: Shilo Olmos  AGE: 58 y.o. SEX: male  : 1965     DATE: 2023     Assessment and Plan:     Problem List Items Addressed This Visit       Essential hypertension     -Continue current regimen losartan 100 mg p.o. daily  -Continue low-salt, low-carb, low sugar, high-fiber, whole food eating.  -Discussed recommended activity level with a goal of 30 minutes moderate intensity exercise 5 days/week.  -Follow-up in 6 months.           Other Visit Diagnoses       Annual physical exam    -  Primary    Mild hyperlipidemia        Relevant Orders    Lipid Panel with Direct LDL reflex    Prostate cancer screening        Relevant Orders    PSA, Total Screen            Immunizations and preventive care screenings were discussed with patient today. Appropriate education was printed on patient's after visit summary.    Discussed risks and benefits of prostate cancer screening. We discussed the controversial history of PSA screening for prostate cancer in the [place] as well as the risk of over detection and over treatment of prostate cancer by way of PSA screening.  The patient understands that PSA blood testing is an imperfect way to screen for prostate cancer and that elevated PSA levels in the blood may also be caused by infection, inflammation, prostatic trauma or manipulation, urological procedures, or by benign prostatic enlargement.    The role of the digital rectal examination in prostate cancer screening was also discussed and I discussed with him that there is large interobserver variability in the findings of digital rectal examination.    Counseling:  Alcohol/drug use: discussed moderation in alcohol intake, the recommendations for healthy alcohol use, and avoidance of illicit drug use.  Dental Health: discussed importance of regular tooth brushing, flossing, and dental visits.  Injury prevention:  discussed safety/seat belts, safety helmets, smoke detectors, carbon dioxide detectors, and smoking near bedding or upholstery.  Sexual health: discussed sexually transmitted diseases, partner selection, use of condoms, avoidance of unintended pregnancy, and contraceptive alternatives.  Exercise: the importance of regular exercise/physical activity was discussed. Recommend exercise 3-5 times per week for at least 30 minutes.          No follow-ups on file.     Chief Complaint:     Chief Complaint   Patient presents with    Physical Exam     Patient being seen for a physical exam      History of Present Illness:     Adult Annual Physical   Patient here for a comprehensive physical exam. The patient reports no problems.    Diet and Physical Activity  Diet/Nutrition: well balanced diet, limited junk food, and consuming 3-5 servings of fruits/vegetables daily.   Exercise: walking and 3-4 times a week on average.      Depression Screening  PHQ-2/9 Depression Screening           General Health  Sleep: sleeps well and gets 7-8 hours of sleep on average.   Hearing: normal - bilateral.  Vision: goes for regular eye exams.   Dental: regular dental visits and brushes teeth twice daily.        Health  Symptoms include: none    Advanced Care Planning  Do you have an advanced directive? no  Do you have a durable medical power of ? no     Review of Systems:     Review of Systems   Constitutional:  Negative for chills and fever.   HENT:  Negative for ear pain and sore throat.    Eyes:  Negative for pain and visual disturbance.   Respiratory:  Negative for cough and shortness of breath.    Cardiovascular:  Negative for chest pain and palpitations.   Gastrointestinal:  Negative for abdominal pain and vomiting.   Endocrine: Negative for polydipsia and polyuria.   Genitourinary:  Negative for dysuria and hematuria.   Musculoskeletal:  Negative for arthralgias and back pain.   Skin:  Negative for color change and rash.    Neurological:  Negative for seizures and syncope.   Psychiatric/Behavioral:  Negative for confusion and sleep disturbance. The patient is not nervous/anxious.    All other systems reviewed and are negative.     Past Medical History:     Past Medical History:   Diagnosis Date    Hypertension     Psoriasis     Sleep apnea       Past Surgical History:     Past Surgical History:   Procedure Laterality Date    KNEE SURGERY Bilateral       Family History:     Family History   Problem Relation Age of Onset    Hypertension Mother     No Known Problems Father       Social History:     Social History     Socioeconomic History    Marital status: /Civil Union     Spouse name: None    Number of children: None    Years of education: None    Highest education level: None   Occupational History    None   Tobacco Use    Smoking status: Every Day     Current packs/day: 0.25     Average packs/day: 0.3 packs/day for 32.0 years (8.0 ttl pk-yrs)     Types: Cigarettes    Smokeless tobacco: Never    Tobacco comments:     1 PPD x 15 years - As per [facility]    Vaping Use    Vaping status: Never Used   Substance and Sexual Activity    Alcohol use: Yes     Comment: rare social use one time per month    Drug use: Never    Sexual activity: Yes     Partners: Female   Other Topics Concern    None   Social History Narrative    Current some day smoker - As per [employer]         · Most recent tobacco use screening:   10-      · Do you currently or have you served in the [employer]:   No     As per [employer]      Social Determinants of Health     Financial Resource Strain: Not on file   Food Insecurity: Not on file   Transportation Needs: Not on file   Physical Activity: Not on file   Stress: Not on file   Social Connections: Not on file   Intimate Partner Violence: Not on file   Housing Stability: Not on file      Current Medications:     Current Outpatient Medications   Medication Sig Dispense Refill    apixaban (Eliquis) 5 mg Take  "2 tablets (10 mg total) by mouth 2 (two) times a day for 7 days, THEN 1 tablet (5 mg total) 2 (two) times a day for 23 days. 74 tablet 0    apixaban (Eliquis) 5 mg Take 1 tablet (5 mg total) by mouth 2 (two) times a day 60 tablet 2    aspirin 81 mg chewable tablet Chew 1 tablet (81 mg total) daily 30 tablet 3    losartan (COZAAR) 100 MG tablet TAKE 1 TABLET BY MOUTH EVERY DAY 90 tablet 1    rosuvastatin (CRESTOR) 10 MG tablet TAKE 1 TABLET BY MOUTH EVERY DAY 90 tablet 1    ascorbic acid (VITAMIN C) 250 MG CHEW Chew 250 mg daily Taking 2 daily (Patient not taking: Reported on 11/20/2023)      Cholecalciferol (Vitamin D) 50 MCG (2000 UT) tablet Take 2,000 Units by mouth daily (Patient not taking: Reported on 10/30/2023)      nicotine (NICODERM CQ) 21 mg/24 hr TD 24 hr patch Place 1 patch on the skin every 24 hours (Patient not taking: Reported on 6/1/2023) 28 patch 0     No current facility-administered medications for this visit.      Allergies:     No Known Allergies   Physical Exam:     /86 (BP Location: Left arm, Patient Position: Sitting, Cuff Size: Standard)   Pulse 68   Temp (!) 97.4 °F (36.3 °C) (Tympanic)   Resp 16   Ht 5' 11.22\" (1.809 m)   Wt 105 kg (232 lb)   SpO2 96%   BMI 32.16 kg/m²     Physical Exam  Vitals and nursing note reviewed.   Constitutional:       General: He is not in acute distress.     Appearance: Normal appearance. He is well-developed.   HENT:      Head: Normocephalic and atraumatic.      Right Ear: Tympanic membrane and external ear normal.      Left Ear: Tympanic membrane and external ear normal.      Nose: Nose normal.      Mouth/Throat:      Mouth: Mucous membranes are moist.   Eyes:      Extraocular Movements: Extraocular movements intact.      Conjunctiva/sclera: Conjunctivae normal.      Pupils: Pupils are equal, round, and reactive to light.   Cardiovascular:      Rate and Rhythm: Normal rate and regular rhythm.      Pulses: Normal pulses.      Heart sounds: No " murmur heard.  Pulmonary:      Effort: Pulmonary effort is normal. No respiratory distress.      Breath sounds: Normal breath sounds. No wheezing, rhonchi or rales.   Abdominal:      General: Bowel sounds are normal.      Palpations: Abdomen is soft.      Tenderness: There is no abdominal tenderness. There is no guarding.   Musculoskeletal:         General: No swelling. Normal range of motion.      Cervical back: Normal range of motion and neck supple.      Right lower leg: No edema.      Left lower leg: No edema.   Lymphadenopathy:      Cervical: No cervical adenopathy.   Skin:     General: Skin is warm and dry.      Capillary Refill: Capillary refill takes less than 2 seconds.   Neurological:      General: No focal deficit present.      Mental Status: He is alert and oriented to person, place, and time.   Psychiatric:         Mood and Affect: Mood normal.         Behavior: Behavior normal.          Naseem Corrigan, DO [facility]

## 2023-12-18 ENCOUNTER — TELEPHONE (OUTPATIENT)
Dept: VASCULAR SURGERY | Facility: CLINIC | Age: 58
End: 2023-12-18

## 2023-12-18 NOTE — TELEPHONE ENCOUNTER
Managed with Prinivil 40 mg qd. Denies any headache, leg swelling stroke or CAD sxs.   Pt's wife called, he is scheduled for surgery (L fem pop bypass) 1/16/24 and received jury summons for 1/29/24.  Wife requested letter to excuse him for jury duty be mailed to their home - done.

## 2023-12-19 ENCOUNTER — HOSPITAL ENCOUNTER (OUTPATIENT)
Dept: VASCULAR ULTRASOUND | Facility: HOSPITAL | Age: 58
Discharge: HOME | End: 2023-12-19
Attending: SURGERY
Payer: COMMERCIAL

## 2023-12-19 DIAGNOSIS — I72.4 POPLITEAL ARTERY ANEURYSM, BILATERAL (HCC): ICD-10-CM

## 2023-12-19 PROCEDURE — 93971 EXTREMITY STUDY: CPT | Performed by: SURGERY

## 2023-12-19 PROCEDURE — 93971 EXTREMITY STUDY: CPT

## 2023-12-24 NOTE — ASSESSMENT & PLAN NOTE
-Continue current regimen losartan 100 mg p.o. daily  -Continue low-salt, low-carb, low sugar, high-fiber, whole food eating.  -Discussed recommended activity level with a goal of 30 minutes moderate intensity exercise 5 days/week.  -Follow-up in 6 months.

## 2024-01-10 ENCOUNTER — OFFICE VISIT (OUTPATIENT)
Dept: CARDIOLOGY CLINIC | Facility: CLINIC | Age: 59
End: 2024-01-10
Payer: COMMERCIAL

## 2024-01-10 ENCOUNTER — PREP FOR PROCEDURE (OUTPATIENT)
Dept: VASCULAR SURGERY | Facility: CLINIC | Age: 59
End: 2024-01-10

## 2024-01-10 VITALS
BODY MASS INDEX: 31.92 KG/M2 | HEIGHT: 71 IN | WEIGHT: 228 LBS | DIASTOLIC BLOOD PRESSURE: 74 MMHG | OXYGEN SATURATION: 96 % | SYSTOLIC BLOOD PRESSURE: 132 MMHG | HEART RATE: 61 BPM

## 2024-01-10 DIAGNOSIS — I71.43 INFRARENAL ABDOMINAL AORTIC ANEURYSM (AAA) WITHOUT RUPTURE (HCC): ICD-10-CM

## 2024-01-10 DIAGNOSIS — I77.810 THORACIC AORTIC ECTASIA (HCC): ICD-10-CM

## 2024-01-10 DIAGNOSIS — Z01.810 PRE-OPERATIVE CARDIOVASCULAR EXAMINATION: Primary | ICD-10-CM

## 2024-01-10 DIAGNOSIS — I72.4 POPLITEAL ARTERY ANEURYSM, BILATERAL (HCC): Primary | ICD-10-CM

## 2024-01-10 DIAGNOSIS — Z72.0 TOBACCO ABUSE: ICD-10-CM

## 2024-01-10 DIAGNOSIS — I72.4 POPLITEAL ARTERY ANEURYSM, BILATERAL (HCC): ICD-10-CM

## 2024-01-10 DIAGNOSIS — E78.2 MIXED HYPERLIPIDEMIA: ICD-10-CM

## 2024-01-10 PROCEDURE — 93000 ELECTROCARDIOGRAM COMPLETE: CPT | Performed by: INTERNAL MEDICINE

## 2024-01-10 PROCEDURE — 99204 OFFICE O/P NEW MOD 45 MIN: CPT | Performed by: INTERNAL MEDICINE

## 2024-01-10 NOTE — PROGRESS NOTES
[facility]    CHIEF COMPLAINT: No chief complaint on file.      HPI:  Shilo Olmos is a 58 y.o. male with a past medical history current everyday smoker, PAD, AAA, bilateral popliteal artery aneurysm, hx DVT (9/2023).    He presents today for preoperative risk assessment for left popliteal aneurysm exclusion and distal SFA to below the knee popliteal artery bypass.  Surgery is tentatively scheduled for next week on 1/16/2024.    He is a current everyday smoker but has significantly cut back from 1 pack/day to 1-3 cigarettes/day.  He does not buy his own packs of cigarettes anymore.    He has a history of hypercholesterolemia and takes rosuvastatin 10 mg.  Last lipid panel was in January 2023.    He has no current exercise regimen.  He complains of leg cramping worse in his right leg. No rest pain. He denies any fever, chills, visual changes, lightheadedness, syncope, chest pain or pressure, palpitations, shortness of breath with exertion, orthopnea, PND.    Social history: Current everyday smoker. Smoking since 20s - up to 1 ppd.   Family history: Mother has hypercholesterolemia, hypertension. Sister has both as well. No history of MI or CVA in first degree relatives. Doesn't know biological father medical history. No family history of aneurysms that he is aware of.     The following portions of the patient's history were reviewed and updated as appropriate: allergies, current medications, past family history, past medical history, past social history, past surgical history, and problem list.    SINCE LAST OV I REVIEWED WITH THE PATIENT THE INTERIM LABS, TEST RESULTS, CONSULTANT(S) NOTES AND PERFORMED AN INTERIM REVIEW OF HISTORY    Past Medical History:   Diagnosis Date    Hypertension     Psoriasis     Sleep apnea        Past Surgical History:   Procedure Laterality Date    KNEE SURGERY Bilateral        Social History     Socioeconomic History    Marital status: /Civil Union      Spouse name: Not on file    Number of children: Not on file    Years of education: Not on file    Highest education level: Not on file   Occupational History    Not on file   Tobacco Use    Smoking status: Every Day     Current packs/day: 0.25     Average packs/day: 0.3 packs/day for 32.0 years (8.0 ttl pk-yrs)     Types: Cigarettes    Smokeless tobacco: Never    Tobacco comments:     1 PPD x 15 years - As per [employer]    Vaping Use    Vaping status: Never Used   Substance and Sexual Activity    Alcohol use: Yes     Comment: rare social use one time per month    Drug use: Never    Sexual activity: Yes     Partners: Female   Other Topics Concern    Not on file   Social History Narrative    Current some day smoker - As per [employer]         · Most recent tobacco use screening:   10-      · Do you currently or have you served in the [employer]:   No     As per [employer]      Social Determinants of Health     Financial Resource Strain: Not on file   Food Insecurity: Not on file   Transportation Needs: Not on file   Physical Activity: Not on file   Stress: Not on file   Social Connections: Not on file   Intimate Partner Violence: Not on file   Housing Stability: Not on file       Family History   Problem Relation Age of Onset    Hypertension Mother     No Known Problems Father        No Known Allergies    Current Outpatient Medications   Medication Sig Dispense Refill    apixaban (Eliquis) 5 mg Take 1 tablet (5 mg total) by mouth 2 (two) times a day 60 tablet 2    aspirin 81 mg chewable tablet Chew 1 tablet (81 mg total) daily 30 tablet 3    losartan (COZAAR) 100 MG tablet TAKE 1 TABLET BY MOUTH EVERY DAY 90 tablet 1    rosuvastatin (CRESTOR) 10 MG tablet TAKE 1 TABLET BY MOUTH EVERY DAY 90 tablet 1    apixaban (Eliquis) 5 mg Take 2 tablets (10 mg total) by mouth 2 (two) times a day for 7 days, THEN 1 tablet (5 mg total) 2 (two) times a day for 23 days. 74 tablet 0    ascorbic acid (VITAMIN C) 250 MG CHEW Chew  "250 mg daily Taking 2 daily (Patient not taking: Reported on 11/20/2023)      Cholecalciferol (Vitamin D) 50 MCG (2000 UT) tablet Take 2,000 Units by mouth daily (Patient not taking: Reported on 10/30/2023)      nicotine (NICODERM CQ) 21 mg/24 hr TD 24 hr patch Place 1 patch on the skin every 24 hours (Patient not taking: Reported on 6/1/2023) 28 patch 0     No current facility-administered medications for this visit.       /74 (BP Location: Left arm, Patient Position: Sitting, Cuff Size: Large)   Pulse 61   Ht 5' 11\" (1.803 m)   Wt 103 kg (228 lb)   SpO2 96%   BMI 31.80 kg/m²     Review of Systems   All other systems reviewed and are negative.      Physical Exam  Vitals reviewed.   Constitutional:       General: He is not in acute distress.     Appearance: Normal appearance. He is well-developed. He is not toxic-appearing.   HENT:      Head: Normocephalic and atraumatic.   Eyes:      General: No scleral icterus.     Extraocular Movements: Extraocular movements intact.      Conjunctiva/sclera: Conjunctivae normal.   Cardiovascular:      Rate and Rhythm: Normal rate and regular rhythm.      Heart sounds: Normal heart sounds. No murmur heard.     No gallop.   Pulmonary:      Effort: Pulmonary effort is normal. No respiratory distress.      Breath sounds: Normal breath sounds. No wheezing or rales.   Abdominal:      General: Abdomen is flat. Bowel sounds are normal. There is no distension.      Palpations: Abdomen is soft.      Tenderness: There is no abdominal tenderness.   Musculoskeletal:      Right lower leg: No edema.      Left lower leg: No edema.   Skin:     General: Skin is warm and dry.      Capillary Refill: Capillary refill takes less than 2 seconds.      Coloration: Skin is not jaundiced or pale.   Neurological:      Mental Status: He is alert.   Psychiatric:         Mood and Affect: Mood normal.         Behavior: Behavior normal.          Lab Results   Component Value Date    K 4.2 10/30/2023    "  10/30/2023    CO2 28 10/30/2023    BUN 15 10/30/2023    CREATININE 1.00 10/30/2023    CALCIUM 9.7 10/30/2023    ALT 25 01/10/2023    AST 20 01/10/2023       Lab Results   Component Value Date    HDL 39 (L) 01/10/2023    LDLCALC 172 (H) 01/10/2023    TRIG 140 01/10/2023       Lab Results   Component Value Date    WBC 6.31 06/01/2019    HGB 17.8 (H) 06/01/2019    HCT 51.3 (H) 06/01/2019     06/01/2019       Cardiac studies:   Results for orders placed or performed in visit on 01/10/24   POCT ECG    Impression    Normal sinus rhythm     Lower limb arterial duplex, bilateral-11/7/2023:   RIGHT LOWER LIMB:  Diffuse disease noted throughout with an occlusion noted in the distal  superficial femoral artery to the proximal popliteal artery. Flow reconstitutes  in the distal popliteal artery.  The  common femoral artery is ectatic measuring 1.89 cm.  There are multiple fusiform aneurysms noted in the distal superficial femoral  artery, and popliteal artery.  Ankle/Brachial index: 0.80 which is in the moderate disease category.  PVR/ PPG tracings are dampened.  Metatarsal pressure of 134 mmHg.  Great toe pressure of 83 mmHg, within the healing range.     LEFT LOWER LIMB:  Diffuse disease noted throughout without any focal stenosis.  There are multiple fusiform aneurysms noted in the popliteal artery with the  largest measuring 2.45 cm.  Ankle/Brachial index: 1.27 which is normal.  PVR/ PPG tracings are normal.  Metatarsal were non compressible.  Great toe pressure of 153 mmHg, within the healing range    CTA abdominal with runoff-11/6/2023:  1. Infrarenal abdominal aortic aneurysm measures up to 4.4 cm. See details above.  2. RIGHT: Distal SFA and popliteal occlusion. Two thrombosed popliteal aneurysms measure 2.5 cm proximally and 2.9 cm distally. Patent three-vessel runoff as demonstrated on delayed images.  3. LEFT: Partially thrombosed popliteal artery aneurysms. A proximal aneurysm measures up to 2.6 cm, a  more distal aneurysm measures 1.8 cm. An area of relative stenosis is demonstrated on series 301 image 332. Patent three-vessel runoff as demonstrated   on delayed images.  4. Indeterminate left lateral renal midpole hypodense lesion, may represent a hemorrhagic cyst. Follow-up with ultrasound or multiphase renal mass protocol can be obtained for further evaluation.  4. Marked colonic diverticulosis without evidence of active inflammation.  5. Large hiatal hernia.  6. Cholelithiasis without evidence of active inflammation.    TTE - 9/15/23:    Left Ventricle: Left ventricular cavity size is normal. Wall thickness is mildly increased. There is mild to moderate concentric hypertrophy. The left ventricular ejection fraction is 60%. Systolic function is normal. Wall motion is normal. Diastolic function is normal.    Aorta: The aortic root is mildly dilated. The ascending aorta is mildly dilated. The aortic root is 4.00 cm. The ascending aorta is 4.2 cm.    CT lung screening-9/15/2023:No nodules and/or definitely benign nodules. Mild emphysema. 43 mm ascending aortic ectasia. Recommend follow-up low radiation dose noncontrast chest CT in one year.     ASSESSMENT AND PLAN:  Diagnoses and all orders for this visit:    #.  Pre-operative cardiovascular examination  #.  Popliteal artery aneurysm, bilateral (HCC)    58-year-old gentleman with the above-mentioned past medical history who presents today in consultation for preoperative risk assessment.  He is tentatively scheduled next week for left popliteal aneurysm exclusion and distal SFA to below the knee popliteal artery bypass with Dr. Parker Valdez.  He is asymptomatic from a cardiac and pulmonary standpoint.  He has no signs/symptoms of congestive heart failure.  Echocardiogram from September 2023 demonstrates normal biventricular size and systolic function, mild to moderate concentric hypertrophy, normal diastolic function, no significant valvular abnormalities.      Preoperative cardiac risk assessment: Alex Perioperative Risk for myocardial infarction or cardiac arrest <1%.    Recommendations:  -No Cardiac Contraindication for Planned Surgical Procedures  -No additional cardiac work up is recommend at this time prior to the above-mentioned procedure  -Continue current cardiac medication in the roberta-operative period    #.  Infrarenal abdominal aortic aneurysm (AAA) without rupture (HCC)  #.  Thoracic aortic ectasia (HCC)  No family history of thoracic aortic disease.  Suspect this is degenerative disease from hypertension and atherosclerosis.  Echocardiogram was personally reviewed and aortic valve is trileaflet.  Recommend repeat limited transthoracic echo 6 months from prior (March 2024) to assess for rapid growth.  If no significant change in size, screening could be every 12 months.  He will be following closely with vascular surgery for continued surveillance of his AAA and popliteal artery aneurysms.  -     Echo follow up/limited w/ contrast if indicated; Future    Mixed hyperlipidemia: Lipid panel from 1/10/2023 with total cholesterol 239, triglycerides 140, HDL 39, .  He has been taking Crestor 10 mg consistently for at least 3 months.  We will repeat a lipid panel to assess if LDL is at goal.    Tobacco abuse: Smoking cessation counseling performed today      Racquel Cortez MD

## 2024-01-10 NOTE — PATIENT INSTRUCTIONS
You were seen today in the Cardiology office for pre operative risk assessment. No medication changes were made today. Please have another echocardiogram performed I March. No additional cardiac testing is required prior to your surgery.    Thank you for choosing [facility].

## 2024-01-12 ENCOUNTER — ANESTHESIA EVENT (OUTPATIENT)
Dept: PERIOP | Facility: HOSPITAL | Age: 59
DRG: 253 | End: 2024-01-12
Payer: COMMERCIAL

## 2024-01-12 NOTE — PRE-PROCEDURE INSTRUCTIONS
Pre-Surgery Instructions:   Medication Instructions    apixaban (Eliquis) 5 mg Instructions provided by MD. Patient aware last dose 1/13/24    aspirin 81 mg chewable tablet Take day of surgery.    losartan (COZAAR) 100 MG tablet Hold day of surgery.    rosuvastatin (CRESTOR) 10 MG tablet Take day of surgery.      Medication instructions for day surgery reviewed. Please use only a sip of water to take your instructed medications. Avoid all over the counter vitamins, supplements and NSAIDS for one week prior to surgery per anesthesia guidelines. Tylenol is ok to take as needed.     You will receive a call one business day prior to surgery with an arrival time and hospital directions. If your surgery is scheduled on a Monday, the hospital will be calling you on the Friday prior to your surgery. If you have not heard from anyone by 8pm, please call the hospital supervisor through the hospital  at [phone]. (Nimesh [phone]).    Do not eat or drink anything after midnight the night before your surgery, including candy, mints, lifesavers, or chewing gum. Do not drink alcohol 24hrs before your surgery. Try not to smoke at least 24hrs before your surgery.       Follow the pre surgery showering instructions as listed in the “My Surgical Experience Booklet” or otherwise provided by your surgeon's office. Do not use a blade to shave the surgical area 1 week before surgery. It is okay to use a clean electric clippers up to 24 hours before surgery. Do not apply any lotions, creams, including makeup, cologne, deodorant, or perfumes after showering on the day of your surgery. Do not use dry shampoo, hair spray, hair gel, or any type of hair products.     No contact lenses, eye make-up, or artificial eyelashes. Remove nail polish, including gel polish, and any artificial, gel, or acrylic nails if possible. Remove all jewelry including rings and body piercing jewelry.     Wear causal clothing that is easy to take  on and off. Consider your type of surgery.    Keep any valuables, jewelry, piercings at home. Please bring any specially ordered equipment (sling, braces) if indicated.    Arrange for a responsible person to drive you to and from the hospital on the day of your surgery. Visitor Guidelines discussed.     Call the surgeon's office with any new illnesses, exposures, or additional questions prior to surgery.    Please reference your “My Surgical Experience Booklet” for additional information to prepare for your upcoming surgery.

## 2024-01-13 ENCOUNTER — LAB REQUISITION (OUTPATIENT)
Dept: LAB | Facility: HOSPITAL | Age: 59
End: 2024-01-13
Payer: COMMERCIAL

## 2024-01-13 ENCOUNTER — APPOINTMENT (OUTPATIENT)
Dept: LAB | Facility: CLINIC | Age: 59
End: 2024-01-13
Payer: COMMERCIAL

## 2024-01-13 DIAGNOSIS — Z12.5 PROSTATE CANCER SCREENING: ICD-10-CM

## 2024-01-13 DIAGNOSIS — I72.4 ANEURYSM OF ARTERY OF LOWER EXTREMITY (HCC): ICD-10-CM

## 2024-01-13 DIAGNOSIS — I72.4 POPLITEAL ARTERY ANEURYSM, BILATERAL (HCC): ICD-10-CM

## 2024-01-13 DIAGNOSIS — E78.5 MILD HYPERLIPIDEMIA: ICD-10-CM

## 2024-01-13 LAB
ABO GROUP BLD: NORMAL
ANION GAP SERPL CALCULATED.3IONS-SCNC: 6 MMOL/L
BLD GP AB SCN SERPL QL: NEGATIVE
BUN SERPL-MCNC: 17 MG/DL (ref 5–25)
CALCIUM SERPL-MCNC: 9.6 MG/DL (ref 8.4–10.2)
CHLORIDE SERPL-SCNC: 105 MMOL/L (ref 96–108)
CO2 SERPL-SCNC: 26 MMOL/L (ref 21–32)
CREAT SERPL-MCNC: 1.08 MG/DL (ref 0.6–1.3)
ERYTHROCYTE [DISTWIDTH] IN BLOOD BY AUTOMATED COUNT: 13.3 % (ref 11.6–15.1)
GFR SERPL CREATININE-BSD FRML MDRD: 75 ML/MIN/1.73SQ M
GLUCOSE P FAST SERPL-MCNC: 97 MG/DL (ref 65–99)
HCT VFR BLD AUTO: 50.1 % (ref 36.5–49.3)
HGB BLD-MCNC: 16.8 G/DL (ref 12–17)
MCH RBC QN AUTO: 30.1 PG (ref 26.8–34.3)
MCHC RBC AUTO-ENTMCNC: 33.5 G/DL (ref 31.4–37.4)
MCV RBC AUTO: 90 FL (ref 82–98)
PLATELET # BLD AUTO: 180 THOUSANDS/UL (ref 149–390)
PMV BLD AUTO: 9.5 FL (ref 8.9–12.7)
POTASSIUM SERPL-SCNC: 4.2 MMOL/L (ref 3.5–5.3)
RBC # BLD AUTO: 5.58 MILLION/UL (ref 3.88–5.62)
RH BLD: POSITIVE
SODIUM SERPL-SCNC: 137 MMOL/L (ref 135–147)
SPECIMEN EXPIRATION DATE: NORMAL
WBC # BLD AUTO: 6.25 THOUSAND/UL (ref 4.31–10.16)

## 2024-01-13 PROCEDURE — 86850 RBC ANTIBODY SCREEN: CPT | Performed by: SURGERY

## 2024-01-13 PROCEDURE — 85027 COMPLETE CBC AUTOMATED: CPT

## 2024-01-13 PROCEDURE — 80048 BASIC METABOLIC PNL TOTAL CA: CPT

## 2024-01-13 PROCEDURE — 36415 COLL VENOUS BLD VENIPUNCTURE: CPT

## 2024-01-13 PROCEDURE — 86900 BLOOD TYPING SEROLOGIC ABO: CPT | Performed by: SURGERY

## 2024-01-13 PROCEDURE — 86901 BLOOD TYPING SEROLOGIC RH(D): CPT | Performed by: SURGERY

## 2024-01-16 ENCOUNTER — HOSPITAL ENCOUNTER (INPATIENT)
Facility: HOSPITAL | Age: 59
LOS: 5 days | Discharge: HOME | DRG: 253 | End: 2024-01-21
Attending: SURGERY | Admitting: SURGERY
Payer: COMMERCIAL

## 2024-01-16 ENCOUNTER — HOSPITAL ENCOUNTER (OUTPATIENT)
Dept: RADIOLOGY | Facility: HOSPITAL | Age: 59
Setting detail: SURGERY ADMIT
Discharge: HOME | DRG: 253 | End: 2024-01-16
Payer: COMMERCIAL

## 2024-01-16 ENCOUNTER — ANESTHESIA (OUTPATIENT)
Dept: PERIOP | Facility: HOSPITAL | Age: 59
DRG: 253 | End: 2024-01-16
Payer: COMMERCIAL

## 2024-01-16 DIAGNOSIS — I72.4 POPLITEAL ARTERY ANEURYSM, BILATERAL (HCC): Primary | ICD-10-CM

## 2024-01-16 DIAGNOSIS — I72.4 POPLITEAL ARTERY ANEURYSM, BILATERAL (HCC): ICD-10-CM

## 2024-01-16 LAB
ABO GROUP BLD: NORMAL
ANION GAP SERPL CALCULATED.3IONS-SCNC: 7 MMOL/L
APTT PPP: 31 SECONDS (ref 23–37)
BUN SERPL-MCNC: 17 MG/DL (ref 5–25)
CALCIUM SERPL-MCNC: 8.7 MG/DL (ref 8.4–10.2)
CHLORIDE SERPL-SCNC: 108 MMOL/L (ref 96–108)
CO2 SERPL-SCNC: 22 MMOL/L (ref 21–32)
CREAT SERPL-MCNC: 0.85 MG/DL (ref 0.6–1.3)
ERYTHROCYTE [DISTWIDTH] IN BLOOD BY AUTOMATED COUNT: 13.2 % (ref 11.6–15.1)
GFR SERPL CREATININE-BSD FRML MDRD: 96 ML/MIN/1.73SQ M
GLUCOSE SERPL-MCNC: 115 MG/DL (ref 65–140)
GLUCOSE SERPL-MCNC: 116 MG/DL (ref 65–140)
HCT VFR BLD AUTO: 46.4 % (ref 36.5–49.3)
HGB BLD-MCNC: 15.7 G/DL (ref 12–17)
MCH RBC QN AUTO: 30.6 PG (ref 26.8–34.3)
MCHC RBC AUTO-ENTMCNC: 33.8 G/DL (ref 31.4–37.4)
MCV RBC AUTO: 90 FL (ref 82–98)
PLATELET # BLD AUTO: 150 THOUSANDS/UL (ref 149–390)
PMV BLD AUTO: 9.9 FL (ref 8.9–12.7)
POTASSIUM SERPL-SCNC: 4.6 MMOL/L (ref 3.5–5.3)
RBC # BLD AUTO: 5.13 MILLION/UL (ref 3.88–5.62)
RH BLD: POSITIVE
SODIUM SERPL-SCNC: 137 MMOL/L (ref 135–147)
WBC # BLD AUTO: 8.44 THOUSAND/UL (ref 4.31–10.16)

## 2024-01-16 PROCEDURE — NC001 PR NO CHARGE: Performed by: SURGERY

## 2024-01-16 PROCEDURE — 85347 COAGULATION TIME ACTIVATED: CPT

## 2024-01-16 PROCEDURE — 80048 BASIC METABOLIC PNL TOTAL CA: CPT | Performed by: SURGERY

## 2024-01-16 PROCEDURE — 35151 REPAIR DEFECT OF ARTERY: CPT | Performed by: SURGERY

## 2024-01-16 PROCEDURE — 94664 DEMO&/EVAL PT USE INHALER: CPT

## 2024-01-16 PROCEDURE — 041N0JQ BYPASS LEFT POPLITEAL ARTERY TO LOWER EXTREMITY ARTERY WITH SYNTHETIC SUBSTITUTE, OPEN APPROACH: ICD-10-PCS | Performed by: SURGERY

## 2024-01-16 PROCEDURE — 85027 COMPLETE CBC AUTOMATED: CPT | Performed by: SURGERY

## 2024-01-16 PROCEDURE — 85730 THROMBOPLASTIN TIME PARTIAL: CPT

## 2024-01-16 PROCEDURE — 82948 REAGENT STRIP/BLOOD GLUCOSE: CPT

## 2024-01-16 RX ORDER — ONDANSETRON 2 MG/ML
4 INJECTION INTRAMUSCULAR; INTRAVENOUS ONCE AS NEEDED
Status: DISCONTINUED | OUTPATIENT
Start: 2024-01-16 | End: 2024-01-16 | Stop reason: HOSPADM

## 2024-01-16 RX ORDER — OXYCODONE HYDROCHLORIDE 5 MG/1
5 TABLET ORAL EVERY 4 HOURS PRN
Status: DISCONTINUED | OUTPATIENT
Start: 2024-01-16 | End: 2024-01-21 | Stop reason: HOSPADM

## 2024-01-16 RX ORDER — OXYCODONE HYDROCHLORIDE 10 MG/1
10 TABLET ORAL EVERY 4 HOURS PRN
Status: DISCONTINUED | OUTPATIENT
Start: 2024-01-16 | End: 2024-01-21 | Stop reason: HOSPADM

## 2024-01-16 RX ORDER — SODIUM CHLORIDE, SODIUM LACTATE, POTASSIUM CHLORIDE, CALCIUM CHLORIDE 600; 310; 30; 20 MG/100ML; MG/100ML; MG/100ML; MG/100ML
100 INJECTION, SOLUTION INTRAVENOUS CONTINUOUS
Status: DISCONTINUED | OUTPATIENT
Start: 2024-01-16 | End: 2024-01-17

## 2024-01-16 RX ORDER — NICOTINE 21 MG/24HR
1 PATCH, TRANSDERMAL 24 HOURS TRANSDERMAL EVERY 24 HOURS
Status: DISCONTINUED | OUTPATIENT
Start: 2024-01-16 | End: 2024-01-21 | Stop reason: HOSPADM

## 2024-01-16 RX ORDER — PRAVASTATIN SODIUM 80 MG/1
80 TABLET ORAL
Status: DISCONTINUED | OUTPATIENT
Start: 2024-01-16 | End: 2024-01-21 | Stop reason: HOSPADM

## 2024-01-16 RX ORDER — HYDROMORPHONE HYDROCHLORIDE 2 MG/ML
INJECTION, SOLUTION INTRAMUSCULAR; INTRAVENOUS; SUBCUTANEOUS AS NEEDED
Status: DISCONTINUED | OUTPATIENT
Start: 2024-01-16 | End: 2024-01-16

## 2024-01-16 RX ORDER — MIDAZOLAM HYDROCHLORIDE 2 MG/2ML
INJECTION, SOLUTION INTRAMUSCULAR; INTRAVENOUS AS NEEDED
Status: DISCONTINUED | OUTPATIENT
Start: 2024-01-16 | End: 2024-01-16

## 2024-01-16 RX ORDER — GLYCOPYRROLATE 0.2 MG/ML
INJECTION INTRAMUSCULAR; INTRAVENOUS AS NEEDED
Status: DISCONTINUED | OUTPATIENT
Start: 2024-01-16 | End: 2024-01-16

## 2024-01-16 RX ORDER — ONDANSETRON 2 MG/ML
INJECTION INTRAMUSCULAR; INTRAVENOUS AS NEEDED
Status: DISCONTINUED | OUTPATIENT
Start: 2024-01-16 | End: 2024-01-16

## 2024-01-16 RX ORDER — HEPARIN SODIUM 10000 [USP'U]/100ML
3-30 INJECTION, SOLUTION INTRAVENOUS
Status: DISCONTINUED | OUTPATIENT
Start: 2024-01-16 | End: 2024-01-17

## 2024-01-16 RX ORDER — PROPOFOL 10 MG/ML
INJECTION, EMULSION INTRAVENOUS AS NEEDED
Status: DISCONTINUED | OUTPATIENT
Start: 2024-01-16 | End: 2024-01-16

## 2024-01-16 RX ORDER — HYDROMORPHONE HCL/PF 1 MG/ML
0.5 SYRINGE (ML) INJECTION
Status: DISCONTINUED | OUTPATIENT
Start: 2024-01-16 | End: 2024-01-16 | Stop reason: HOSPADM

## 2024-01-16 RX ORDER — SODIUM CHLORIDE, SODIUM LACTATE, POTASSIUM CHLORIDE, CALCIUM CHLORIDE 600; 310; 30; 20 MG/100ML; MG/100ML; MG/100ML; MG/100ML
50 INJECTION, SOLUTION INTRAVENOUS CONTINUOUS
Status: DISCONTINUED | OUTPATIENT
Start: 2024-01-16 | End: 2024-01-16

## 2024-01-16 RX ORDER — ROCURONIUM BROMIDE 10 MG/ML
INJECTION, SOLUTION INTRAVENOUS AS NEEDED
Status: DISCONTINUED | OUTPATIENT
Start: 2024-01-16 | End: 2024-01-16

## 2024-01-16 RX ORDER — HEPARIN SODIUM 1000 [USP'U]/ML
4000 INJECTION, SOLUTION INTRAVENOUS; SUBCUTANEOUS EVERY 6 HOURS PRN
Status: DISCONTINUED | OUTPATIENT
Start: 2024-01-16 | End: 2024-01-17

## 2024-01-16 RX ORDER — CEFAZOLIN SODIUM 1 G/3ML
INJECTION, POWDER, FOR SOLUTION INTRAMUSCULAR; INTRAVENOUS AS NEEDED
Status: DISCONTINUED | OUTPATIENT
Start: 2024-01-16 | End: 2024-01-16

## 2024-01-16 RX ORDER — HYDROMORPHONE HCL/PF 1 MG/ML
0.5 SYRINGE (ML) INJECTION EVERY 4 HOURS PRN
Status: DISCONTINUED | OUTPATIENT
Start: 2024-01-16 | End: 2024-01-21 | Stop reason: HOSPADM

## 2024-01-16 RX ORDER — LIDOCAINE HYDROCHLORIDE 10 MG/ML
0.5 INJECTION, SOLUTION EPIDURAL; INFILTRATION; INTRACAUDAL; PERINEURAL ONCE AS NEEDED
Status: DISCONTINUED | OUTPATIENT
Start: 2024-01-16 | End: 2024-01-16 | Stop reason: HOSPADM

## 2024-01-16 RX ORDER — ONDANSETRON 2 MG/ML
4 INJECTION INTRAMUSCULAR; INTRAVENOUS EVERY 6 HOURS PRN
Status: DISCONTINUED | OUTPATIENT
Start: 2024-01-16 | End: 2024-01-21 | Stop reason: HOSPADM

## 2024-01-16 RX ORDER — CHLORHEXIDINE GLUCONATE ORAL RINSE 1.2 MG/ML
15 SOLUTION DENTAL ONCE
Status: COMPLETED | OUTPATIENT
Start: 2024-01-16 | End: 2024-01-16

## 2024-01-16 RX ORDER — SODIUM CHLORIDE 9 MG/ML
INJECTION, SOLUTION INTRAVENOUS CONTINUOUS PRN
Status: DISCONTINUED | OUTPATIENT
Start: 2024-01-16 | End: 2024-01-16

## 2024-01-16 RX ORDER — LIDOCAINE HYDROCHLORIDE 10 MG/ML
INJECTION, SOLUTION EPIDURAL; INFILTRATION; INTRACAUDAL; PERINEURAL AS NEEDED
Status: DISCONTINUED | OUTPATIENT
Start: 2024-01-16 | End: 2024-01-16

## 2024-01-16 RX ORDER — HEPARIN SODIUM 1000 [USP'U]/ML
INJECTION, SOLUTION INTRAVENOUS; SUBCUTANEOUS AS NEEDED
Status: DISCONTINUED | OUTPATIENT
Start: 2024-01-16 | End: 2024-01-16

## 2024-01-16 RX ORDER — FENTANYL CITRATE 50 UG/ML
INJECTION, SOLUTION INTRAMUSCULAR; INTRAVENOUS AS NEEDED
Status: DISCONTINUED | OUTPATIENT
Start: 2024-01-16 | End: 2024-01-16

## 2024-01-16 RX ORDER — DEXAMETHASONE SODIUM PHOSPHATE 10 MG/ML
INJECTION, SOLUTION INTRAMUSCULAR; INTRAVENOUS AS NEEDED
Status: DISCONTINUED | OUTPATIENT
Start: 2024-01-16 | End: 2024-01-16

## 2024-01-16 RX ORDER — SODIUM CHLORIDE, SODIUM LACTATE, POTASSIUM CHLORIDE, CALCIUM CHLORIDE 600; 310; 30; 20 MG/100ML; MG/100ML; MG/100ML; MG/100ML
INJECTION, SOLUTION INTRAVENOUS CONTINUOUS PRN
Status: DISCONTINUED | OUTPATIENT
Start: 2024-01-16 | End: 2024-01-16

## 2024-01-16 RX ORDER — ENOXAPARIN SODIUM 100 MG/ML
40 INJECTION SUBCUTANEOUS DAILY
Status: DISCONTINUED | OUTPATIENT
Start: 2024-01-16 | End: 2024-01-16

## 2024-01-16 RX ORDER — ASPIRIN 81 MG/1
81 TABLET, CHEWABLE ORAL DAILY
Status: DISCONTINUED | OUTPATIENT
Start: 2024-01-16 | End: 2024-01-21 | Stop reason: HOSPADM

## 2024-01-16 RX ORDER — PROPOFOL 10 MG/ML
INJECTION, EMULSION INTRAVENOUS CONTINUOUS PRN
Status: DISCONTINUED | OUTPATIENT
Start: 2024-01-16 | End: 2024-01-16

## 2024-01-16 RX ORDER — HEPARIN SODIUM 1000 [USP'U]/ML
8000 INJECTION, SOLUTION INTRAVENOUS; SUBCUTANEOUS EVERY 6 HOURS PRN
Status: DISCONTINUED | OUTPATIENT
Start: 2024-01-16 | End: 2024-01-17

## 2024-01-16 RX ORDER — MAGNESIUM HYDROXIDE 1200 MG/15ML
LIQUID ORAL AS NEEDED
Status: DISCONTINUED | OUTPATIENT
Start: 2024-01-16 | End: 2024-01-16 | Stop reason: HOSPADM

## 2024-01-16 RX ORDER — ACETAMINOPHEN 325 MG/1
975 TABLET ORAL EVERY 8 HOURS SCHEDULED
Status: DISCONTINUED | OUTPATIENT
Start: 2024-01-16 | End: 2024-01-21 | Stop reason: HOSPADM

## 2024-01-16 RX ADMIN — HEPARIN SODIUM 2000 UNITS: 1000 INJECTION, SOLUTION INTRAVENOUS; SUBCUTANEOUS at 12:11

## 2024-01-16 RX ADMIN — HYDROMORPHONE HYDROCHLORIDE 0.5 MG: 1 INJECTION, SOLUTION INTRAMUSCULAR; INTRAVENOUS; SUBCUTANEOUS at 14:04

## 2024-01-16 RX ADMIN — HYDROMORPHONE HYDROCHLORIDE 0.6 MG: 2 INJECTION, SOLUTION INTRAMUSCULAR; INTRAVENOUS; SUBCUTANEOUS at 12:37

## 2024-01-16 RX ADMIN — DEXAMETHASONE SODIUM PHOSPHATE 5 MG: 10 INJECTION, SOLUTION INTRAMUSCULAR; INTRAVENOUS at 09:50

## 2024-01-16 RX ADMIN — GLYCOPYRROLATE 0.2 MG: 0.2 INJECTION INTRAMUSCULAR; INTRAVENOUS at 10:59

## 2024-01-16 RX ADMIN — ONDANSETRON 4 MG: 2 INJECTION INTRAMUSCULAR; INTRAVENOUS at 12:55

## 2024-01-16 RX ADMIN — HYDROMORPHONE HYDROCHLORIDE 0.5 MG: 1 INJECTION, SOLUTION INTRAMUSCULAR; INTRAVENOUS; SUBCUTANEOUS at 13:57

## 2024-01-16 RX ADMIN — OXYCODONE HYDROCHLORIDE 10 MG: 10 TABLET ORAL at 20:06

## 2024-01-16 RX ADMIN — ACETAMINOPHEN 975 MG: 325 TABLET, FILM COATED ORAL at 22:14

## 2024-01-16 RX ADMIN — HYDROMORPHONE HYDROCHLORIDE 0.5 MG: 1 INJECTION, SOLUTION INTRAMUSCULAR; INTRAVENOUS; SUBCUTANEOUS at 14:15

## 2024-01-16 RX ADMIN — FENTANYL CITRATE 100 MCG: 50 INJECTION, SOLUTION INTRAMUSCULAR; INTRAVENOUS at 09:17

## 2024-01-16 RX ADMIN — SODIUM CHLORIDE, SODIUM LACTATE, POTASSIUM CHLORIDE, CALCIUM CHLORIDE: 600; 310; 30; 20 INJECTION, SOLUTION INTRAVENOUS at 09:11

## 2024-01-16 RX ADMIN — ROCURONIUM BROMIDE 20 MG: 10 INJECTION, SOLUTION INTRAVENOUS at 10:35

## 2024-01-16 RX ADMIN — PRAVASTATIN SODIUM 80 MG: 80 TABLET ORAL at 16:44

## 2024-01-16 RX ADMIN — PROPOFOL 150 MCG/KG/MIN: 10 INJECTION, EMULSION INTRAVENOUS at 12:48

## 2024-01-16 RX ADMIN — HEPARIN SODIUM 3000 UNITS: 1000 INJECTION, SOLUTION INTRAVENOUS; SUBCUTANEOUS at 11:22

## 2024-01-16 RX ADMIN — HEPARIN SODIUM 10000 UNITS: 1000 INJECTION, SOLUTION INTRAVENOUS; SUBCUTANEOUS at 11:04

## 2024-01-16 RX ADMIN — HYDROMORPHONE HYDROCHLORIDE 0.4 MG: 2 INJECTION, SOLUTION INTRAMUSCULAR; INTRAVENOUS; SUBCUTANEOUS at 10:41

## 2024-01-16 RX ADMIN — ASPIRIN 81 MG CHEWABLE TABLET 81 MG: 81 TABLET CHEWABLE at 16:44

## 2024-01-16 RX ADMIN — MIDAZOLAM HYDROCHLORIDE 2 MG: 2 INJECTION, SOLUTION INTRAMUSCULAR; INTRAVENOUS at 09:07

## 2024-01-16 RX ADMIN — ACETAMINOPHEN 975 MG: 325 TABLET, FILM COATED ORAL at 16:43

## 2024-01-16 RX ADMIN — HYDROMORPHONE HYDROCHLORIDE 0.6 MG: 2 INJECTION, SOLUTION INTRAMUSCULAR; INTRAVENOUS; SUBCUTANEOUS at 09:50

## 2024-01-16 RX ADMIN — ROCURONIUM BROMIDE 30 MG: 10 INJECTION, SOLUTION INTRAVENOUS at 09:50

## 2024-01-16 RX ADMIN — OXYCODONE HYDROCHLORIDE 10 MG: 10 TABLET ORAL at 14:39

## 2024-01-16 RX ADMIN — CHLORHEXIDINE GLUCONATE 15 ML: 1.2 SOLUTION ORAL at 09:03

## 2024-01-16 RX ADMIN — HYDROMORPHONE HYDROCHLORIDE 0.5 MG: 1 INJECTION, SOLUTION INTRAMUSCULAR; INTRAVENOUS; SUBCUTANEOUS at 14:27

## 2024-01-16 RX ADMIN — ENOXAPARIN SODIUM 40 MG: 40 INJECTION SUBCUTANEOUS at 16:45

## 2024-01-16 RX ADMIN — LIDOCAINE HYDROCHLORIDE 50 MG: 10 INJECTION, SOLUTION EPIDURAL; INFILTRATION; INTRACAUDAL; PERINEURAL at 09:17

## 2024-01-16 RX ADMIN — PROPOFOL 180 MG: 10 INJECTION, EMULSION INTRAVENOUS at 09:17

## 2024-01-16 RX ADMIN — SODIUM CHLORIDE, SODIUM LACTATE, POTASSIUM CHLORIDE, AND CALCIUM CHLORIDE 100 ML/HR: .6; .31; .03; .02 INJECTION, SOLUTION INTRAVENOUS at 14:13

## 2024-01-16 RX ADMIN — ROCURONIUM BROMIDE 50 MG: 10 INJECTION, SOLUTION INTRAVENOUS at 09:18

## 2024-01-16 RX ADMIN — CEFAZOLIN SODIUM 2000 MG: 1 INJECTION, POWDER, FOR SOLUTION INTRAMUSCULAR; INTRAVENOUS at 09:34

## 2024-01-16 RX ADMIN — HEPARIN SODIUM 2000 UNITS: 1000 INJECTION, SOLUTION INTRAVENOUS; SUBCUTANEOUS at 11:33

## 2024-01-16 RX ADMIN — SODIUM CHLORIDE: 9 INJECTION, SOLUTION INTRAVENOUS at 09:25

## 2024-01-16 RX ADMIN — HEPARIN SODIUM 18 UNITS/KG/HR: 10000 INJECTION, SOLUTION INTRAVENOUS at 20:11

## 2024-01-16 NOTE — RESPIRATORY THERAPY NOTE
"RT Protocol Note  Shilo Olmos 58 y.o. male MRN: [id number]  Unit/Bed#: [facility] 519-01 Encounter: [id number]    Assessment    Active Problems:  There are no active Hospital Problems.      Home Pulmonary Medications:  none       Past Medical History:   Diagnosis Date    Hypertension     Psoriasis      Social History     Socioeconomic History    Marital status: /Civil Union     Spouse name: None    Number of children: None    Years of education: None    Highest education level: None   Occupational History    None   Tobacco Use    Smoking status: Every Day     Current packs/day: 0.25     Average packs/day: 0.3 packs/day for 32.0 years (8.0 ttl pk-yrs)     Types: Cigarettes    Smokeless tobacco: Never    Tobacco comments:     1 PPD x 15 years - As per [employer]    Vaping Use    Vaping status: Never Used   Substance and Sexual Activity    Alcohol use: Yes     Comment: rare social use one time per month    Drug use: Never    Sexual activity: Yes     Partners: Female   Other Topics Concern    None   Social History Narrative    Current some day smoker - As per [employer]         · Most recent tobacco use screening:   10-      · Do you currently or have you served in the [employer]:   No     As per [place]      Social Determinants of Health     Financial Resource Strain: Not on file   Food Insecurity: Not on file   Transportation Needs: Not on file   Physical Activity: Not on file   Stress: Not on file   Social Connections: Not on file   Intimate Partner Violence: Not on file   Housing Stability: Not on file       Subjective         Objective    Physical Exam:   Assessment Type: Assess only  General Appearance: Awake  Respiratory Pattern: Normal  Chest Assessment: Chest expansion symmetrical  Bilateral Breath Sounds: Clear, Diminished    Vitals:  Blood pressure 112/74, pulse 64, temperature (!) 97.4 °F (36.3 °C), resp. rate 12, height 5' 11\" (1.803 m), weight 103 kg (228 lb), SpO2 98%.          Imaging and " other studies: I have personally reviewed pertinent reports.            Plan    Respiratory Plan: Discontinue Protocol        Resp Comments: pt has no respiratory hx or takes any respiratory meds @ home  pt is not admitted for respiratory issues no other respiratory intervention is needed at this time will d/c from protocol

## 2024-01-16 NOTE — ANESTHESIA POSTPROCEDURE EVALUATION
Post-Op Assessment Note    CV Status:  Stable  Pain Score: 0    Pain management: adequate       Mental Status:  Arousable and sleepy   Hydration Status:  Euvolemic   PONV Controlled:  Controlled   Airway Patency:  Patent     Post Op Vitals Reviewed: Yes      Staff: CRNA               BP   140/94   Temp   97.2   Pulse  81   Resp   12   SpO2   94

## 2024-01-16 NOTE — H&P
History and Physical    HPI: Mr. Olmos is a 58-year-old male  who presented to our office setting for evaluation of abdominal aortic aneurysm.  He was concurrently found to have bilateral popliteal artery aneurysms.  The right side been thrombosed.  The left side is likely symptomatic as he has been treated for gout in the past with what sounds more like blue toe syndrome.  These embolic events can precipitate acute limb ischemia in some settings with a high rate of limb loss.  We discussed this at length in the office setting and rehash again this morning.    Past Medical History:   Diagnosis Date    Hypertension     Psoriasis      Past Surgical History:   Procedure Laterality Date    KNEE SURGERY Bilateral      FH: noncontributory    Social History   Smoker, down to several borrowed cigarettes per day from 1 ppd    Review of Systems - General ROS: negative    There were no vitals filed for this visit.  Physical Exam  Constitutional:       Appearance: He is normal weight.   HENT:      Head: Normocephalic and atraumatic.      Nose: No congestion or rhinorrhea.   Eyes:      Extraocular Movements: Extraocular movements intact.      Pupils: Pupils are equal, round, and reactive to light.   Cardiovascular:      Rate and Rhythm: Normal rate and regular rhythm.   Pulmonary:      Effort: Pulmonary effort is normal.      Breath sounds: No stridor.   Abdominal:      General: There is no distension.      Tenderness: There is no abdominal tenderness.   Musculoskeletal:         General: No swelling or tenderness.      Cervical back: Normal range of motion and neck supple.   Skin:     Capillary Refill: Capillary refill takes less than 2 seconds.   Neurological:      General: No focal deficit present.      Mental Status: He is alert and oriented to person, place, and time.   Psychiatric:         Mood and Affect: Mood normal.         Behavior: Behavior normal.          Lab Results   Component Value Date    WBC 6.25  01/13/2024    HGB 16.8 01/13/2024    HCT 50.1 (H) 01/13/2024    MCV 90 01/13/2024     01/13/2024     Lab Results   Component Value Date    SODIUM 137 01/13/2024    K 4.2 01/13/2024     01/13/2024    CO2 26 01/13/2024    BUN 17 01/13/2024    CREATININE 1.08 01/13/2024    GLUC 94 08/24/2021    CALCIUM 9.6 01/13/2024     Assessment: 58M with bilateral popliteal aneurysms.  R occluded and claudication present.  Left patent with embolic phenomena noted.      Plan: Fusiform appearing aneurysm, will perform AK pop to BK pop bypass with exclusion.  Vein mapping suggests ample ipsilateral conduit.    Parker Valdez MD

## 2024-01-16 NOTE — ANESTHESIA PROCEDURE NOTES
Arterial Line Insertion    Performed by: Guy Suarez MD  Authorized by: Guy Suarez MD  Consent: Written consent obtained.  Risks and benefits: risks, benefits and alternatives were discussed  Consent given by: patient  Required items: required blood products, implants, devices, and special equipment available  Patient identity confirmed: anonymous protocol, patient vented/unresponsive  Preparation: Patient was prepped and draped in the usual sterile fashion.  Indications: hemodynamic monitoring  Orientation:  Left  Location: radial artery  Sedation:  Patient sedated: yes  Sedation type: (GETA)  Vitals: Vital signs were monitored during sedation.    Procedure Details:  Needle gauge: 20  Seldinger technique: Seldinger technique used  Number of attempts: 2    Post-procedure:  Waveform: good waveform  Patient tolerance: patient tolerated the procedure well with no immediate complications

## 2024-01-16 NOTE — ANESTHESIA PREPROCEDURE EVALUATION
Procedure:  BYPASS FEMORAL-POPLITEAL -Left above knee popliteal to below knee popliteal artery bypass (Left: Leg Upper)    Relevant Problems   CARDIO   (+) Essential hypertension   (+) Infrarenal abdominal aortic aneurysm (AAA) without rupture (HCC)   (+) Popliteal artery aneurysm, bilateral (HCC)     Denies recent fever, cough or other symptom of upper respiratory tract infection.    Confirmed NPO appropriate    Physical Exam    Airway    Mallampati score: II  TM Distance: >3 FB  Neck ROM: full     Dental   Comment: edentulous     Cardiovascular      Pulmonary      Other Findings        9/15/23 TTE: Normal biventricular systolic function. No significant valvular disease.    Anesthesia Plan  ASA Score- 3     Anesthesia Type- general with ASA Monitors.         Additional Monitors: arterial line.    Airway Plan: ETT.           Plan Factors-Exercise tolerance (METS): >4 METS.Exercise comment: Limited by claudication, but able to climb two flights of stairs without chest pain or shortness of breath.    Chart reviewed.   Existing labs reviewed.     Patient is a current smoker.              Induction- intravenous.    Postoperative Plan- Plan for postoperative opioid use. Planned trial extubation    Informed Consent- Anesthetic plan and risks discussed with patient.

## 2024-01-16 NOTE — OP NOTE
DATE OF PROCEDURE: 01/16/24     PERFORMING SERVICE: Vascular and Endovascular Surgery    ATTENDING SURGEON: Parker Valdez MD    PREOPERATIVE DIAGNOSIS: Left popliteal artery aneurysm, symptomatic, > 2cm    POSTOPERATIVE DIAGNOSIS: Same    PROCEDURE NAME:    Left above knee popliteal artery to below knee popliteal artery bypass with ipsilateral reversed greater saphenous vein  Exclusion of left popliteal artery aneurysm  Left greater saphenectomy    ANESTHESIA: general    EBL: 50cc    UOP:  300 cc    IVF:  2000 cc     SPECIMENS:  None     COMPLICATIONS: None    DRAINS: None    INDICATION:  Mr. Olmos is a 58-year-old male  who presented to our office setting for evaluation of abdominal aortic aneurysm.  He was concurrently found to have bilateral popliteal artery aneurysms.  The right side been thrombosed.  The left side is likely symptomatic as he has been treated for gout in the past with what sounds more like blue toe syndrome.  These embolic events can precipitate acute limb ischemia in some settings with a high rate of limb loss.  We discussed this at length in the office setting and rehash again this morning.      INTRAOPERATIVE FINDINGS: Thrombus in the site of the distal anastomosis consistent with emboli from the aneurysm.  This is concordant with the patients recent history of left calf pain dating approximately 2 weeks ago.  He presented with multiphasic signal.  He office evaluation approximately 2 months ago demonstrated a pulsatile dorsalis pedis.  Following bypass today he did examine with a dorsalis pedis pulse.      ASSISTING SURGEON: Dr. Selvin Merino DO, Dr. Gerber Bagley DO    DESCRIPTION OF PROCEDURE:   Informed consent was obtained from the patient prior to proceeding to the operating room.  The patient was then identified in the pre-anesthesia area, then taken to the operating room, placed in the supine position on the operating table, and induced under general  endotracheal anesthesia. The patient was correctly positioned, padded at all pressure points. The operative field was then prepared and draped in a sterile fashion.  A pre-operative timeout was performed.  Preoperative antibiotics were administered at this time.      A longitudinal incision was made with a #15 blade on the medial aspect of the left thigh just above the knee.  Dissection was carried down through the subcutaneous tissue.  The sartorius was identified and retracted posteriorly.  The avascular plane was entered and the popliteal neurovascular bundle was isolated.  The popliteal artery was dissected free circumferentially and encircled with vessel loops proximally and distally.      We then turned our attention to the below-knee popliteal artery exposure. A longitudinal incision was made with a #15 blade on the medial aspect of the left lower extremity just below the knee and posterior to the tibia.  Dissection was carried down through the skin and subcutaneous tissue and the gastrocnemius was retracted posteriorly.  An avascular plane was entered and further blunt dissection was used to identify the neurovascular bundle.  The below-knee popliteal artery was gently  from the nerve and vein with a combination of sharp dissection with Metzenbaum scissors and blunt dissection.  The below-knee popliteal artery was then encircled proximally and distally with Silastic Vesseloops. A [place] tunneler was then used to create an anatomic tunnel between the common femoral and below-knee popliteal arteries.    An incision was made on the left lower extremity over the area where the saphenous vein was previously identified under ultrasound guidance. The saphenous vein was skeletonized and branching vessels were divided between ligatures of silk and surgical clips. The vein was flushed multiple times with heparinized saline and any points of leak were closed with 4-0 silk ties or interrupted 6-0 Prolene  sutures.    The patient was then systemically heparinized. ACTs were periodically checked and heparin was re-dosed to maintain ACT >250 throughout the case.    After control of the femoral vessels with silastic vessel loops and an atraumatic vascular clamp placed on the distal EIA, a patchotomy was created with a #11 blade and this was extended proximally and distally with Henriquez scissors.  The greater saphenous conduit was brought onto the field.  The end of the conduit was cut to size, spatulated, and beveled.  The proximal anastomosis was completed with 6-0 running prolene suture.  The graft was then brought through the previously created tunnel ensuring that no kinks or twists were introduced during the process. Appropriate 1:1 movement of the graft was appreciated after the tunneler was removed. A soft noncalcified region of the below knee artery was identified.  Proximal and distal Vesseloops were pulled up and a #11 blade was used to create a longitudinal arteriotomy. This was extended proximally distally with Henriquez scissors. A chronic appearing aneurysm thrombus was removed.  The distal vasculature was assessed with a #3 umu catheter with no further return of emboli or thrombus.  The distal aspect of the graft was was cut to size, spatulated, and beveled. The anastomosis was then created with running 6-0 prolene suture.  The native artery was backbled and the anastomosis was de-aired.     After completion of the anastomoses, the patient was given protamine. Good hemostasis was achieved in the wound beds. The wounds were closed in multiple layers of monocryl suture, and the skin was closed with 4-0 Monocryl. The incisions were covered with dermabond and primapore dressings.     Parker Valdez MD  01/16/24   9:28 AM    Vascular Quality Initiative - Infra-Inguinal Bypass   [id number]]      Urgency: Elective     Side: left       Functional Status:  Fully active; able to carry on all  predisease activities without restriction.   Ambulation: Amb = independently ambulatory       Ambulation:  Amb = independently ambulatory    Exercise Program:  NO    Pathology:Aneurysm,    Completion Assessment  Artery 1 treated: Popliteal   Left               Outflow: AT,PT,Peroneal: 3             Segments treated: bypass and exclusion                       Was this Site previously treated?: No          TASC Grade: Not occlusive disease          Maximum Aneurysm Diameter: 2.6 cm            Anesthesia: General  ASA Class: ASA 3 - Patient with moderate systemic disease with functional limitations    Skin Prep:Chlorhexidene    Graft Origin: AK Pop    Graft Recipient: BK Pop    Graft Vein Type: Reversed GSV    Number of Vein Segments: 1    Prosthetic: None      Groin Incision: None    Total Procedure Time: No case tracking events are documented in the log.    Contrast Volume: 0 ml    Fluoro Time: 0 minutes    Air Kerma: 00 mGy   OR    DAP: 0 Gy.cm2    EBL: Minimal    If Graft Vein: Vein [place] Incision: Skip    Vein Graft Location: Sub-fascial    Adjuncts:  Vein Cuff:  no Sequential Graft:no    Concomitant Proximal Ipsilateral:   PVI: no    Completion Study:   Doppler: yes   Duplex: no    Arteriogram: no

## 2024-01-17 ENCOUNTER — ANESTHESIA EVENT (INPATIENT)
Dept: PERIOP | Facility: HOSPITAL | Age: 59
DRG: 253 | End: 2024-01-17
Payer: COMMERCIAL

## 2024-01-17 ENCOUNTER — ANESTHESIA (INPATIENT)
Dept: PERIOP | Facility: HOSPITAL | Age: 59
DRG: 253 | End: 2024-01-17
Payer: COMMERCIAL

## 2024-01-17 LAB
ANION GAP SERPL CALCULATED.3IONS-SCNC: 7 MMOL/L
APTT PPP: 83 SECONDS (ref 23–37)
BUN SERPL-MCNC: 19 MG/DL (ref 5–25)
CALCIUM SERPL-MCNC: 8.4 MG/DL (ref 8.4–10.2)
CHLORIDE SERPL-SCNC: 104 MMOL/L (ref 96–108)
CO2 SERPL-SCNC: 23 MMOL/L (ref 21–32)
CREAT SERPL-MCNC: 0.92 MG/DL (ref 0.6–1.3)
ERYTHROCYTE [DISTWIDTH] IN BLOOD BY AUTOMATED COUNT: 13.3 % (ref 11.6–15.1)
GFR SERPL CREATININE-BSD FRML MDRD: 91 ML/MIN/1.73SQ M
GLUCOSE SERPL-MCNC: 126 MG/DL (ref 65–140)
HCT VFR BLD AUTO: 36.8 % (ref 36.5–49.3)
HGB BLD-MCNC: 12.4 G/DL (ref 12–17)
KCT BLD-ACNC: 228 SEC (ref 89–137)
KCT BLD-ACNC: 246 SEC (ref 89–137)
KCT BLD-ACNC: 252 SEC (ref 89–137)
MCH RBC QN AUTO: 30.2 PG (ref 26.8–34.3)
MCHC RBC AUTO-ENTMCNC: 33.7 G/DL (ref 31.4–37.4)
MCV RBC AUTO: 90 FL (ref 82–98)
PLATELET # BLD AUTO: 126 THOUSANDS/UL (ref 149–390)
PLATELET # BLD AUTO: 167 THOUSANDS/UL (ref 149–390)
PMV BLD AUTO: 10 FL (ref 8.9–12.7)
PMV BLD AUTO: 9.8 FL (ref 8.9–12.7)
POTASSIUM SERPL-SCNC: 4.3 MMOL/L (ref 3.5–5.3)
RBC # BLD AUTO: 4.11 MILLION/UL (ref 3.88–5.62)
SODIUM SERPL-SCNC: 134 MMOL/L (ref 135–147)
SPECIMEN SOURCE: ABNORMAL
WBC # BLD AUTO: 11.67 THOUSAND/UL (ref 4.31–10.16)

## 2024-01-17 PROCEDURE — 27301 DRAIN THIGH/KNEE LESION: CPT | Performed by: SURGERY

## 2024-01-17 PROCEDURE — 85730 THROMBOPLASTIN TIME PARTIAL: CPT | Performed by: SURGERY

## 2024-01-17 PROCEDURE — 80048 BASIC METABOLIC PNL TOTAL CA: CPT | Performed by: SURGERY

## 2024-01-17 PROCEDURE — 85049 AUTOMATED PLATELET COUNT: CPT | Performed by: STUDENT IN AN ORGANIZED HEALTH CARE EDUCATION/TRAINING PROGRAM

## 2024-01-17 PROCEDURE — 85027 COMPLETE CBC AUTOMATED: CPT | Performed by: SURGERY

## 2024-01-17 PROCEDURE — 99024 POSTOP FOLLOW-UP VISIT: CPT | Performed by: SURGERY

## 2024-01-17 PROCEDURE — 0JCP0ZZ EXTIRPATION OF MATTER FROM LEFT LOWER LEG SUBCUTANEOUS TISSUE AND FASCIA, OPEN APPROACH: ICD-10-PCS | Performed by: SURGERY

## 2024-01-17 RX ORDER — HYDROMORPHONE HCL/PF 1 MG/ML
0.5 SYRINGE (ML) INJECTION
Status: DISCONTINUED | OUTPATIENT
Start: 2024-01-17 | End: 2024-01-17 | Stop reason: HOSPADM

## 2024-01-17 RX ORDER — DEXAMETHASONE SODIUM PHOSPHATE 10 MG/ML
INJECTION, SOLUTION INTRAMUSCULAR; INTRAVENOUS AS NEEDED
Status: DISCONTINUED | OUTPATIENT
Start: 2024-01-17 | End: 2024-01-17

## 2024-01-17 RX ORDER — FENTANYL CITRATE/PF 50 MCG/ML
25 SYRINGE (ML) INJECTION
Status: DISCONTINUED | OUTPATIENT
Start: 2024-01-17 | End: 2024-01-17 | Stop reason: HOSPADM

## 2024-01-17 RX ORDER — HYDROMORPHONE HCL/PF 1 MG/ML
SYRINGE (ML) INJECTION AS NEEDED
Status: DISCONTINUED | OUTPATIENT
Start: 2024-01-17 | End: 2024-01-17

## 2024-01-17 RX ORDER — EPHEDRINE SULFATE 50 MG/ML
INJECTION INTRAVENOUS AS NEEDED
Status: DISCONTINUED | OUTPATIENT
Start: 2024-01-17 | End: 2024-01-17

## 2024-01-17 RX ORDER — MIDAZOLAM HYDROCHLORIDE 2 MG/2ML
INJECTION, SOLUTION INTRAMUSCULAR; INTRAVENOUS AS NEEDED
Status: DISCONTINUED | OUTPATIENT
Start: 2024-01-17 | End: 2024-01-17

## 2024-01-17 RX ORDER — ALBUTEROL SULFATE 2.5 MG/3ML
2.5 SOLUTION RESPIRATORY (INHALATION) ONCE AS NEEDED
Status: DISCONTINUED | OUTPATIENT
Start: 2024-01-17 | End: 2024-01-17 | Stop reason: HOSPADM

## 2024-01-17 RX ORDER — ONDANSETRON 2 MG/ML
4 INJECTION INTRAMUSCULAR; INTRAVENOUS ONCE AS NEEDED
Status: DISCONTINUED | OUTPATIENT
Start: 2024-01-17 | End: 2024-01-17 | Stop reason: HOSPADM

## 2024-01-17 RX ORDER — CEFAZOLIN SODIUM 2 G/50ML
2000 SOLUTION INTRAVENOUS ONCE
Status: COMPLETED | OUTPATIENT
Start: 2024-01-17 | End: 2024-01-19

## 2024-01-17 RX ORDER — LOSARTAN POTASSIUM 50 MG/1
100 TABLET ORAL DAILY
Status: DISCONTINUED | OUTPATIENT
Start: 2024-01-17 | End: 2024-01-21 | Stop reason: HOSPADM

## 2024-01-17 RX ORDER — ALBUMIN, HUMAN INJ 5% 5 %
SOLUTION INTRAVENOUS CONTINUOUS PRN
Status: DISCONTINUED | OUTPATIENT
Start: 2024-01-17 | End: 2024-01-17

## 2024-01-17 RX ORDER — PROPOFOL 10 MG/ML
INJECTION, EMULSION INTRAVENOUS AS NEEDED
Status: DISCONTINUED | OUTPATIENT
Start: 2024-01-17 | End: 2024-01-17

## 2024-01-17 RX ORDER — HYDRALAZINE HYDROCHLORIDE 20 MG/ML
5 INJECTION INTRAMUSCULAR; INTRAVENOUS EVERY 6 HOURS PRN
Status: DISCONTINUED | OUTPATIENT
Start: 2024-01-17 | End: 2024-01-21 | Stop reason: HOSPADM

## 2024-01-17 RX ORDER — HEPARIN SODIUM 5000 [USP'U]/ML
5000 INJECTION, SOLUTION INTRAVENOUS; SUBCUTANEOUS EVERY 8 HOURS SCHEDULED
Status: DISCONTINUED | OUTPATIENT
Start: 2024-01-17 | End: 2024-01-18

## 2024-01-17 RX ORDER — SUCCINYLCHOLINE/SOD CL,ISO/PF 100 MG/5ML
SYRINGE (ML) INTRAVENOUS AS NEEDED
Status: DISCONTINUED | OUTPATIENT
Start: 2024-01-17 | End: 2024-01-17

## 2024-01-17 RX ORDER — MEPERIDINE HYDROCHLORIDE 25 MG/ML
12.5 INJECTION INTRAMUSCULAR; INTRAVENOUS; SUBCUTANEOUS
Status: DISCONTINUED | OUTPATIENT
Start: 2024-01-17 | End: 2024-01-17 | Stop reason: HOSPADM

## 2024-01-17 RX ORDER — CEFAZOLIN SODIUM 1 G/50ML
1000 SOLUTION INTRAVENOUS EVERY 8 HOURS
Qty: 100 ML | Refills: 0 | Status: COMPLETED | OUTPATIENT
Start: 2024-01-17 | End: 2024-01-18

## 2024-01-17 RX ORDER — LIDOCAINE HYDROCHLORIDE 10 MG/ML
INJECTION, SOLUTION EPIDURAL; INFILTRATION; INTRACAUDAL; PERINEURAL AS NEEDED
Status: DISCONTINUED | OUTPATIENT
Start: 2024-01-17 | End: 2024-01-17

## 2024-01-17 RX ORDER — SODIUM CHLORIDE, SODIUM LACTATE, POTASSIUM CHLORIDE, CALCIUM CHLORIDE 600; 310; 30; 20 MG/100ML; MG/100ML; MG/100ML; MG/100ML
INJECTION, SOLUTION INTRAVENOUS CONTINUOUS PRN
Status: DISCONTINUED | OUTPATIENT
Start: 2024-01-17 | End: 2024-01-17

## 2024-01-17 RX ORDER — ONDANSETRON 2 MG/ML
INJECTION INTRAMUSCULAR; INTRAVENOUS AS NEEDED
Status: DISCONTINUED | OUTPATIENT
Start: 2024-01-17 | End: 2024-01-17

## 2024-01-17 RX ORDER — FENTANYL CITRATE 50 UG/ML
INJECTION, SOLUTION INTRAMUSCULAR; INTRAVENOUS AS NEEDED
Status: DISCONTINUED | OUTPATIENT
Start: 2024-01-17 | End: 2024-01-17

## 2024-01-17 RX ORDER — CEFAZOLIN SODIUM 1 G/3ML
INJECTION, POWDER, FOR SOLUTION INTRAMUSCULAR; INTRAVENOUS AS NEEDED
Status: DISCONTINUED | OUTPATIENT
Start: 2024-01-17 | End: 2024-01-17

## 2024-01-17 RX ORDER — PHENYLEPHRINE HCL IN 0.9% NACL 1 MG/10 ML
SYRINGE (ML) INTRAVENOUS AS NEEDED
Status: DISCONTINUED | OUTPATIENT
Start: 2024-01-17 | End: 2024-01-17

## 2024-01-17 RX ORDER — PROMETHAZINE HYDROCHLORIDE 25 MG/ML
12.5 INJECTION, SOLUTION INTRAMUSCULAR; INTRAVENOUS ONCE AS NEEDED
Status: DISCONTINUED | OUTPATIENT
Start: 2024-01-17 | End: 2024-01-17 | Stop reason: HOSPADM

## 2024-01-17 RX ADMIN — EPHEDRINE SULFATE 5 MG: 50 INJECTION, SOLUTION INTRAVENOUS at 11:10

## 2024-01-17 RX ADMIN — ONDANSETRON 4 MG: 2 INJECTION INTRAMUSCULAR; INTRAVENOUS at 12:19

## 2024-01-17 RX ADMIN — EPHEDRINE SULFATE 5 MG: 50 INJECTION, SOLUTION INTRAVENOUS at 11:18

## 2024-01-17 RX ADMIN — PHENYLEPHRINE HYDROCHLORIDE 30 MCG/MIN: 10 INJECTION INTRAVENOUS at 12:08

## 2024-01-17 RX ADMIN — PROPOFOL 200 MG: 10 INJECTION, EMULSION INTRAVENOUS at 10:35

## 2024-01-17 RX ADMIN — HYDROMORPHONE HYDROCHLORIDE 0.5 MG: 1 INJECTION, SOLUTION INTRAMUSCULAR; INTRAVENOUS; SUBCUTANEOUS at 09:49

## 2024-01-17 RX ADMIN — OXYCODONE HYDROCHLORIDE 10 MG: 10 TABLET ORAL at 22:21

## 2024-01-17 RX ADMIN — HEPARIN SODIUM 5000 UNITS: 5000 INJECTION INTRAVENOUS; SUBCUTANEOUS at 22:22

## 2024-01-17 RX ADMIN — DEXAMETHASONE SODIUM PHOSPHATE 10 MG: 10 INJECTION, SOLUTION INTRAMUSCULAR; INTRAVENOUS at 10:44

## 2024-01-17 RX ADMIN — SODIUM CHLORIDE, SODIUM LACTATE, POTASSIUM CHLORIDE, AND CALCIUM CHLORIDE: .6; .31; .03; .02 INJECTION, SOLUTION INTRAVENOUS at 11:49

## 2024-01-17 RX ADMIN — MIDAZOLAM 2 MG: 1 INJECTION INTRAMUSCULAR; INTRAVENOUS at 10:29

## 2024-01-17 RX ADMIN — CEFAZOLIN SODIUM 1000 MG: 1 SOLUTION INTRAVENOUS at 17:32

## 2024-01-17 RX ADMIN — HYDROMORPHONE HYDROCHLORIDE 0.5 MG: 1 INJECTION, SOLUTION INTRAMUSCULAR; INTRAVENOUS; SUBCUTANEOUS at 12:45

## 2024-01-17 RX ADMIN — SODIUM CHLORIDE, SODIUM LACTATE, POTASSIUM CHLORIDE, AND CALCIUM CHLORIDE 100 ML/HR: .6; .31; .03; .02 INJECTION, SOLUTION INTRAVENOUS at 00:08

## 2024-01-17 RX ADMIN — FENTANYL CITRATE 50 MCG: 50 INJECTION INTRAMUSCULAR; INTRAVENOUS at 10:35

## 2024-01-17 RX ADMIN — Medication 100 MCG: at 11:09

## 2024-01-17 RX ADMIN — PRAVASTATIN SODIUM 80 MG: 80 TABLET ORAL at 16:53

## 2024-01-17 RX ADMIN — LIDOCAINE HYDROCHLORIDE 50 MG: 10 INJECTION, SOLUTION EPIDURAL; INFILTRATION; INTRACAUDAL at 10:35

## 2024-01-17 RX ADMIN — OXYCODONE HYDROCHLORIDE 10 MG: 10 TABLET ORAL at 00:02

## 2024-01-17 RX ADMIN — CEFAZOLIN 2000 MG: 1 INJECTION, POWDER, FOR SOLUTION INTRAMUSCULAR; INTRAVENOUS at 10:40

## 2024-01-17 RX ADMIN — Medication 200 MG: at 10:35

## 2024-01-17 RX ADMIN — ACETAMINOPHEN 975 MG: 325 TABLET, FILM COATED ORAL at 05:41

## 2024-01-17 RX ADMIN — LOSARTAN POTASSIUM 100 MG: 50 TABLET, FILM COATED ORAL at 16:53

## 2024-01-17 RX ADMIN — OXYCODONE HYDROCHLORIDE 10 MG: 10 TABLET ORAL at 05:41

## 2024-01-17 RX ADMIN — EPHEDRINE SULFATE 5 MG: 50 INJECTION, SOLUTION INTRAVENOUS at 11:38

## 2024-01-17 RX ADMIN — ALBUMIN (HUMAN): 12.5 INJECTION, SOLUTION INTRAVENOUS at 11:34

## 2024-01-17 RX ADMIN — SODIUM CHLORIDE, SODIUM LACTATE, POTASSIUM CHLORIDE, AND CALCIUM CHLORIDE: .6; .31; .03; .02 INJECTION, SOLUTION INTRAVENOUS at 10:25

## 2024-01-17 RX ADMIN — ALBUMIN (HUMAN): 12.5 INJECTION, SOLUTION INTRAVENOUS at 11:09

## 2024-01-17 RX ADMIN — FENTANYL CITRATE 50 MCG: 50 INJECTION INTRAMUSCULAR; INTRAVENOUS at 10:29

## 2024-01-17 RX ADMIN — Medication 100 MCG: at 10:48

## 2024-01-17 RX ADMIN — ACETAMINOPHEN 975 MG: 325 TABLET, FILM COATED ORAL at 22:21

## 2024-01-17 RX ADMIN — PHENYLEPHRINE HYDROCHLORIDE 20 MCG/MIN: 10 INJECTION INTRAVENOUS at 10:45

## 2024-01-17 RX ADMIN — HEPARIN SODIUM 5000 UNITS: 5000 INJECTION INTRAVENOUS; SUBCUTANEOUS at 16:53

## 2024-01-17 RX ADMIN — OXYCODONE HYDROCHLORIDE 10 MG: 10 TABLET ORAL at 18:16

## 2024-01-17 RX ADMIN — ASPIRIN 81 MG CHEWABLE TABLET 81 MG: 81 TABLET CHEWABLE at 08:31

## 2024-01-17 NOTE — PHYSICAL THERAPY NOTE
Physical Therapy Cancellation Note           01/17/24 0816   PT Last Visit   PT Visit Date 01/17/24   Note Type   Note type Cancelled Session;Evaluation   Cancel Reasons Patient to operating room   Additional Comments PT orders received and chart reviewed. Spoke to RN this AM, pt is on active bedrest and is to go to OR today. PT will hold and continue to follow       Vaishnavi Walker PT, DPT

## 2024-01-17 NOTE — PROGRESS NOTES
Progress Note - Vascular Surgery   Shilo Olmos 58 y.o. male [id number]  Unit/Bed#:[facility] 519-01 Encounter: [id number]      Assessment:    58 y.o. with PMH HTN, Psoriasis, B/L Knee surgery presenting with Left Popliteal aneurysm (2.45cm) for an elective left leg revascularization and exclusion of popliteal aneurysm.      1/17: Left SFA to BK Pop rGSV bypass c Pop aneurysm exclusion     Plan:  - Monitor left leg edema   - Elevate, compress leg   - expected post-op edema and swelling in lower extremity   - Significant edema in popliteal fossa likely secondary to trauma related to bypass surgery; hold heparin drip this AM and will closely observe.   - Cont NV chks   - Left leg palpable DP/PT   - motor and sensory intact  - Diet: NPO; can resume diet as tolerated if no worsening in leg swelling   - Abx: None  - VTEppx: SCDs; will resume anticoagulation if leg leg swelling stable  - Will plan to discharge on Eliquis  - Cont Aspirin, Statin    Subjective:    Pt s/e. No acute events overnight. Pt awake, alert.    Pt complains of pain in calf muslce, incisional pain. Tolerating diet. Not OOB. Voiding c galeano.     No new complaints. Denies n/v, sob, chest pain, f/c.     I/Os:  I/O last 3 completed shifts:  In: 1200 [I.V.:1200]  Out: 290 [Urine:290]  I/O this shift:  In: 3188 [P.O.:1440; I.V.:1748]  Out: 1650 [Urine:1650]    Review of Systems   All other systems reviewed and are negative.      Vitals:    01/16/24 2312   BP: 102/72   Pulse:    Resp: 17   Temp:    SpO2: 94%        Physical Exam  Vitals and nursing note reviewed.   Constitutional:       General: He is not in acute distress.     Appearance: He is well-developed. He is not ill-appearing, toxic-appearing or diaphoretic.   HENT:      Head: Normocephalic and atraumatic.   Eyes:      Conjunctiva/sclera: Conjunctivae normal.   Cardiovascular:      Rate and Rhythm: Normal rate and regular rhythm.      Heart sounds: No murmur heard.     Comments: B/L Palp fem  LLE  "Palp DP/PT  Pulmonary:      Effort: Pulmonary effort is normal. No respiratory distress.      Breath sounds: Normal breath sounds.   Abdominal:      Palpations: Abdomen is soft.      Tenderness: There is no abdominal tenderness. There is no guarding.   Musculoskeletal:         General: No swelling.      Cervical back: Neck supple.      Right lower leg: No edema.      Left lower leg: Edema present.      Comments: Surgical incisions c/d/I, no bleeding, no hematoma    Left Leg anterior/lateral compartment edematous  Left leg posterior compartments edematous, tender with some ecchymosis    Skin:     General: Skin is warm and dry.      Capillary Refill: Capillary refill takes less than 2 seconds.   Neurological:      General: No focal deficit present.      Mental Status: He is alert and oriented to person, place, and time. Mental status is at baseline.   Psychiatric:         Mood and Affect: Mood normal.         Imaging:I have personally reviewed pertinent reports.        Lab Results and Cultures:   Lab Results   Component Value Date    WBC 11.67 (H) 01/17/2024    HGB 12.4 01/17/2024    HCT 36.8 01/17/2024    MCV 90 01/17/2024     01/17/2024     Lab Results   Component Value Date    CALCIUM 8.4 01/17/2024    K 4.3 01/17/2024    CO2 23 01/17/2024     01/17/2024    BUN 19 01/17/2024    CREATININE 0.92 01/17/2024     No results found for: \"INR\", \"PROTIME\"     Blood Culture: No results found for: \"BLOODCX\",   Urinalysis: No results found for: \"COLORU\", \"CLARITYU\", \"SPECGRAV\", \"PHUR\", \"LEUKOCYTESUR\", \"NITRITE\", \"PROTEINUA\", \"GLUCOSEU\", \"KETONESU\", \"BILIRUBINUR\", \"BLOODU\",   Urine Culture: No results found for: \"URINECX\",   Wound Culure: No results found for: \"WOUNDCULT\"    Medications:  Current Facility-Administered Medications   Medication Dose Route Frequency    acetaminophen (TYLENOL) tablet 975 mg  975 mg Oral Q8H NAWAF    aspirin chewable tablet 81 mg  81 mg Oral Daily    heparin (porcine) 25,000 units in " 0.45% NaCl 250 mL infusion (premix)  3-30 Units/kg/hr (Order-Specific) Intravenous Titrated    heparin (porcine) 4,000 Units, papaverine 120 mg in multi-electrolyte (PLASMALYTE-A/ISOLYTE-S PH 7.4) 1,000 mL irrigation   Irrigation Once    heparin (porcine) injection 4,000 Units  4,000 Units Intravenous Q6H PRN    heparin (porcine) injection 8,000 Units  8,000 Units Intravenous Q6H PRN    HYDROmorphone (DILAUDID) injection 0.5 mg  0.5 mg Intravenous Q4H PRN    lactated ringers bolus 500 mL  500 mL Intravenous Once PRN    And    lactated ringers bolus 500 mL  500 mL Intravenous Once PRN    nicotine (NICODERM CQ) 21 mg/24 hr TD 24 hr patch 1 patch  1 patch Transdermal Q24H    ondansetron (ZOFRAN) injection 4 mg  4 mg Intravenous Q6H PRN    oxyCODONE (ROXICODONE) IR tablet 10 mg  10 mg Oral Q4H PRN    oxyCODONE (ROXICODONE) IR tablet 5 mg  5 mg Oral Q4H PRN    pravastatin (PRAVACHOL) tablet 80 mg  80 mg Oral Daily With Dinner    sodium chloride 0.9 % bolus 500 mL  500 mL Intravenous Once PRN    And    sodium chloride 0.9 % bolus 500 mL  500 mL Intravenous Once PRN       Patient Active Problem List   Diagnosis    Essential hypertension    Insect bite of lower leg with local reaction, right, initial encounter    Leg swelling    Gout of left foot    Primary osteoarthritis of left knee    Acute deep vein thrombosis (DVT) of tibial vein of right lower extremity (HCC)    Popliteal artery aneurysm, bilateral (HCC)    Infrarenal abdominal aortic aneurysm (AAA) without rupture (HCC)       Past Surgical History:   Procedure Laterality Date    KNEE SURGERY Bilateral        Family History   Problem Relation Age of Onset    Hypertension Mother     No Known Problems Father        Social History     Socioeconomic History    Marital status: /Civil Union     Spouse name: Not on file    Number of children: Not on file    Years of education: Not on file    Highest education level: Not on file   Occupational History    Not on file    Tobacco Use    Smoking status: Every Day     Current packs/day: 0.25     Average packs/day: 0.3 packs/day for 32.0 years (8.0 ttl pk-yrs)     Types: Cigarettes    Smokeless tobacco: Never    Tobacco comments:     1 PPD x 15 years - As per [employer]    Vaping Use    Vaping status: Never Used   Substance and Sexual Activity    Alcohol use: Yes     Comment: rare social use one time per month    Drug use: Never    Sexual activity: Yes     Partners: Female   Other Topics Concern    Not on file   Social History Narrative    Current some day smoker - As per [employer]         · Most recent tobacco use screening:   10-      · Do you currently or have you served in the [employer]:   No     As per [employer]      Social Determinants of Health     Financial Resource Strain: Not on file   Food Insecurity: Not on file   Transportation Needs: Not on file   Physical Activity: Not on file   Stress: Not on file   Social Connections: Not on file   Intimate Partner Violence: Not on file   Housing Stability: Not on file       No Known Allergies

## 2024-01-17 NOTE — QUICK NOTE
"Vascular Note: (Post op Check)    POD #1 s/p L SFA to BK pop bypass with exclusion of pop a. Aneurysm.    Patient seen and examined in room. Family at bedside.  Earlier today returned  to OR for Evacuation of hematoma and control of active persistent oozing from \"toe\"of proximal anastomosis.  Patient reports leg feels \"much better now.\"  Patient/wife mention that Dr Valdez had stop by a little earlier.  Of note Dr Valdez had scheduled cases at another campus earlier today however had been kept apprised of patient status. He had been in direct communication with family throughout.    /82   Pulse 102   Temp 98 °F (36.7 °C)   Resp 20   Ht 5' 11\" (1.803 m)   Wt 103 kg (228 lb)   SpO2 94%   BMI 31.80 kg/m²     VAC maintaining good seal  Some strikethrough along dressing to distal thigh.  Easily palpable DP pulse with excellent DP/PT signals.  Motor exam has remained intact throughout.  Sensation improved post op.  Compartments appropriate    Continue Neurovascular checks  Pain control as needed  Hold off on anticoagulation at this time.  "

## 2024-01-17 NOTE — OP NOTE
OPERATIVE REPORT  PATIENT NAME: Shilo Olmos    :  1965  MRN: [id number]  Pt Location: [facility] OR ROOM 18    SURGERY DATE: 2024    Surgeons and Role:     * Mariana Vargas, DO - Primary     * Gerber Bagley, DO - Assisting     * Selvin Merino, DO - Assisting    Preop Diagnosis:  Popliteal artery aneurysm, bilateral (HCC) [I72.4]    Post-Op Diagnosis Codes:     * Popliteal artery aneurysm, bilateral (HCC) [I72.4]    Procedure(s):  Left - EVACUATION/ DRAINAGE HEMATOMA. EXPLORATION ON LEFT LEG BYPASS. CONTROLL OF BLEEDING    Specimen(s):  * No specimens in log *    Estimated Blood Loss:   350 mL    Drains:  [REMOVED] Urethral Catheter Latex 16 Fr. (Removed)   Reasons to continue Urinary Catheter  Accurate I&O assessment in critically ill patients (48 hr. max) 24 0000   Site Assessment Clean;Skin intact 24 0000   Acharya Care Done 24 2100   Collection Container Standard drainage bag 24 0000   Securement Method Securing device (Describe) 24 0000   Output (mL) 650 mL 24 0559   Number of days: 1       [REMOVED] Urethral Catheter Latex 16 Fr. (Removed)   Number of days: 0       Anesthesia Type:   General    Operative Indications:  Popliteal artery aneurysm, bilateral (HCC) [I72.4]  Patient is a 58-year-old gentleman who is postop day 1 from left SFA to below-knee pop bypass with exclusion of popliteal artery aneurysm. Intraoperative course unremarkable however early this morning on team rounds was complaining of increase pain in left distal thigh and lower leg.  On exam he was noted to be edematous however with palpable pedal pulses.  He was later reassessed personally by me and at time of my examination was complaining of worse pain with decreased sensation of his foot/lower 1/3 of leg. I was not able to appreciate any pedal pulses although did continue to have doppler signals. His distal thigh and lower leg appeared firm on my exam. Discussed my concerns with  patient of hematoma secondary to anticoagulation effect and/or possible compartment syndrome. I recommend we proceed to OR emergently for formal exploration, evacuation of hematoma, possible bypass thrombectomy/revision if needed, possible VAC. Procedure, risks, benefits, alternatives, and anticipated postoperative course discussed in detail.  All questions answered to his/her satisfaction.  Patient was agreeable to proceed.  Written consent was obtained.      Operative Findings:    -Moderated amount of hematoma evacuated from the below knee pop exposure site.  The distal anastomosis was fully exposed and noted to be intact with no signs of active bleeding. No other active bleeding source was identified. There was minor generalized oozing from surrounding tissues.    -The hematoma appeared to track up through the anatomic tunnel/tract for the bypass.  As such the distal thigh (proximal anastomotic site) incision was reopened. Additional hematoma evacuated. Dissection was carried down to the proximal anastomotic site. There was evidence of active ooze from the toe of the anastomosis. This was controlled with a pledgeted 6-0 prolene suture. Additional topical hemostatic agent to include thrombin/gelfoam as well floseal employed with good hemostasis.    -The bypass was patent and easily palpable at conclusion of case.    -The distal thigh incision was re approximated with interrupted vertical mattress 2-0 nylon suture.    -The below knee pop incision site was partially re approximated with nylon suture.  The mid portion was left open due to concern of excessive tension and VAC.  The wound dimension left open measured approximately 5cm x 3cm x 3cm    Complications:   None immediately apparent    Procedure and Technique:  The patient was brought identified in the preop holding area.  The patient had been consented up in his room earlier in the morning.  Operative site was marked.  Patient was brought to the operating  room where he was positioned supine on the OR table.  After adequate induction of general anesthesia Acharya catheter was inserted under sterile conditions.  Patient had his arterial line in place from his index procedure on 1/16/2024.  The left lower extremity was circumferentially prepped and draped in usual sterile fashion.  A preoperative dose of antibiotics was administered.  A formal timeout was performed and all were in agreement.  We initiated the procedure by reopening of the below-knee popliteal exposure site incision with a #15 scalpel.  The skin incision was opened along its entirety. The fascial closure was also reopened.  Upon opening the fascia there was immediate hematoma that was immediately expressed. Self retaining retractors were placed to facilitate exposure.  The wound was copiously irrigated and additional hematoma evacuated.  It was eventually carried down into the distal anastomosis which was visualized in its entirety.  There was no evidence of active bleeding. There was generalized ooze form surrounding tissue likely related to anticoagulation effect. The hematoma appears to track cephalad through the anatomic tunnel created for the bypass.  As such decision was made to reopen the distal thigh incision at site of proximal anastomosis. Again additional hematoma was noted and evacuated. The wound was copiously irrigated to allow additional clot removal. At base of wound it appeared there was more bright red blood pooling. Dissection was carried down to this site. The proximal anastomosis was exposed. It was noted that at toe of anastomosis there was persistent bright red oozing. The remainder of the anastomosis was intact with no bleeding.  Unclear whether oozing site was emanating from a previous venous branch site. Nevertheless, a 6-0 prolene suture was placed with no apparent change at ooze site. Next, a pledget 6-0 prolene was placed with good control of the oozing site. The wound was  further irrigated. Topical hemostatic agents used to assist with hemostasis. Of note the thrombin gelfoam was removed prior to closure of wound.  Of note there was an easily appreciable bypass pulse in the exposed segments both proximally and distally.  Floseal was applied to the site of both anastomoses. The proximal anastomosis was inspected numerous times and observed for approximately 20 minutes prior to proceeding with closure.  The distal thigh incision site was re-approximated with interrupted 2-0 nylon suture in a vertical mattress fashion.  The below popliteal artery incision site was partially re-approximated with interrupted 2-0 nylon suture. The mid portion of incision measuring 5cm x 3cm x 3cm was left open and a single piece of VAC sponge placed within wound and connected to suction with good seal.  The patient tolerated procedure well.  All needles, instruments and sponge counts were reported correct.  The patient was awakened, extubated and transferred to recovery room stable condition.  There was an easily appreciable DP pulse and faint PT pulse at conclusion of case.     I was present for the entire procedure.  The patient's wife and mother were apprised of the intraoperative findings/procedure at conclusion of procedure.    Patient Disposition:  PACU         SIGNATURE: Mariana Vargas DO  DATE: January 17, 2024  TIME: 2:24 PM

## 2024-01-17 NOTE — QUICK NOTE
"Post-Op Check - Vascular Surgery   Shilo Olmos 58 y.o. male MRN: [id number]  Unit/Bed#: [facility] 519-01 Encounter: [id number]    ASSESMENT:  58yoM with bilateral pop aneurysm (R thrombosed) presents for elective exclusion of L popliteal aneurysm, L fem bk pop bypass with rGSV tunneled anatomically.     PLAN:  - Incentive spirometry  - Diet as tolerated  - PRN analgesia  - Goal SBP normotensive; -160; A-Line for accurate BP monitoring overnight  - I/Os; galeano in place  - hep gtt to be restarted overnight; NO initial bolus, okay for reboluses as needed to obtain therapeutic PTT   - Continue ASA/Statin  - Continue frequent neurovascular checks    Subjective: Patient with mild incisional site pain, anxious to be up OOB. Tolerated diet without nausea/emesis. Denies paresthesias, motor weakness to LLE.     Vitals:  /78   Pulse 66   Temp 97.6 °F (36.4 °C)   Resp 18   Ht 5' 11\" (1.803 m)   Wt 103 kg (228 lb)   SpO2 96%   BMI 31.80 kg/m²     Physical Exam:  General appearance: alert and oriented, in no acute distress  Neurologic: Grossly neurologically intact  Neck: supple, symmetrical, trachea midline  Lungs:  Normal work of breathing, no accessory muscle use  Heart:  Regular rate and rhythm  Abdomen: soft, non-tender abdomen  Extremities:  Bilateral LE M/S intact. LLE with mepilex dressings in place. Incision sites grossly soft without noted hematoma, minimal strike through at medial dressing at knee.     Pulse exam:  Femoral: Right: 2+ Left: 2+  DP: Right: doppler signal Left: 2+  PT: Right: doppler signal Left: 2+    I/Os:  I/O last 3 completed shifts:  In: 1200 [I.V.:1200]  Out: 290 [Urine:290]  No intake/output data recorded.    Invasive Lines/Tubes:  Invasive Devices       Peripheral Intravenous Line  Duration             Peripheral IV 01/16/24 Left Hand <1 day    Peripheral IV 01/16/24 Right Wrist <1 day              Arterial Line  Duration             Arterial Line 01/16/24 Left Radial <1 day "              Drain  Duration             Urethral Catheter Latex 16 Fr. <1 day                    VTE Prophylaxis: RX contraindicated due to: hep gtt    Luanne Zaldivar PA-C  1/16/2024

## 2024-01-17 NOTE — ANESTHESIA PREPROCEDURE EVALUATION
Procedure:  EVACUATION/ DRAINAGE HEMATOMA, possible fasciotomy, possible wound vac (Left: Leg Lower)  BYPASS FEMORAL-POPLITEAL (Leg Upper)    Relevant Problems   CARDIO   (+) Acute deep vein thrombosis (DVT) of tibial vein of right lower extremity (HCC)   (+) Essential hypertension   (+) Infrarenal abdominal aortic aneurysm (AAA) without rupture (HCC)      MUSCULOSKELETAL   (+) Gout of left foot   (+) Primary osteoarthritis of left knee        Physical Exam    Airway    Mallampati score: I  TM Distance: >3 FB  Neck ROM: full     Dental       Cardiovascular  Cardiovascular exam normal    Pulmonary  Pulmonary exam normal     Other Findings        Anesthesia Plan  ASA Score- 3 Emergent    Anesthesia Type- general with ASA Monitors.         Additional Monitors:     Airway Plan: ETT.           Plan Factors-Exercise tolerance (METS): >4 METS.    Chart reviewed. EKG reviewed. Imaging results reviewed. Existing labs reviewed. Patient summary reviewed.    Patient is not a current smoker.  Patient did not smoke on day of surgery.    Obstructive sleep apnea risk education given perioperatively.        Induction- intravenous.    Postoperative Plan- Plan for postoperative opioid use. Planned trial extubation    Informed Consent- Anesthetic plan and risks discussed with patient.  I personally reviewed this patient with the CRNA. Discussed and agreed on the Anesthesia Plan with the CRNA..

## 2024-01-17 NOTE — ANESTHESIA POSTPROCEDURE EVALUATION
Post-Op Assessment Note    CV Status:  Stable  Pain Score: 0    Pain management: adequate       Mental Status:  Awake   Hydration Status:  Euvolemic   PONV Controlled:  Controlled   Airway Patency:  Patent  Two or more mitigation strategies used for obstructive sleep apnea   Post Op Vitals Reviewed: Yes      Staff: Anesthesiologist, CRNA               /79 (01/17/24 1302)    Temp 98.4 °F (36.9 °C) (01/17/24 1302)    Pulse 97 (01/17/24 1302)   Resp 16 (01/17/24 1302)    SpO2   91%

## 2024-01-17 NOTE — UTILIZATION REVIEW
"NOTIFICATION OF INPATIENT ADMISSION   AUTHORIZATION REQUEST   [facility]  Address: [address] [place] [address]  Tax ID: [id number]  NPI: [id number] ATTENDING PROVIDER:  Attending Name and NPI#: Parker Valdez Md [[id number]]  Address: [address] [place] [address]  Phone: [phone]   ADMISSION INFORMATION:  Place of Service: Inpatient [facility]  Place of Service Code: 21  Inpatient Admission Date/Time: 1/16/24  1:30 PM  Discharge Date/Time: No discharge date for patient encounter.  Admitting Diagnosis Code/Description:  Popliteal artery aneurysm, bilateral (HCC) [I72.4]     UTILIZATION REVIEW CONTACT:  Destiny Romero"Sosa\"[place] Utilization   Network Utilization Review Department  Phone: [phone]  Fax: [phone]  Email: [email or web address]  Contact for approvals/pending authorizations, clinical reviews, and discharge.     PHYSICIAN ADVISORY SERVICES:  Medical Necessity Denial & Gpka-xv-Jqrh Review  Phone: [phone]  Fax: [phone]  Email: [email or web address]     DISCHARGE SUPPORT TEAM:  For Patients Discharge Needs & Updates  Phone: [phone] opt. 2 Fax: [phone]  Email: [email or web address]     "

## 2024-01-17 NOTE — UTILIZATION REVIEW
Initial Clinical Review    Elective IP surgical procedure  Age/Sex: 58 y.o. male  Surgery Date: 1/16/24  Procedure:   PREOPERATIVE DIAGNOSIS: Left popliteal artery aneurysm, symptomatic, > 2cm     POSTOPERATIVE DIAGNOSIS: Same     PROCEDURE NAME:    Left above knee popliteal artery to below knee popliteal artery bypass with ipsilateral reversed greater saphenous vein  Exclusion of left popliteal artery aneurysm  Left greater saphenectomy     Anesthesia: General   Operative Findings:  Thrombus in the site of the distal anastomosis consistent with emboli from the aneurysm.  This is concordant with the patients recent history of left calf pain dating approximately 2 weeks ago.  He presented with multiphasic signal.  He office evaluation approximately 2 months ago demonstrated a pulsatile dorsalis pedis.  Following bypass today he did examine with a dorsalis pedis pulse.       POD#1 Progress Note:   Pt c/o worsening pain and sensory deficits to L foot dursim/plantar surfaces, motor function in tact, dressing C/D/I.  [place] site on distal thigh w/ ecchymosis.  LLL compartments are firm and tender to palpation, no DP/PT pulses but can get doppler signals.  Concern for compartment syndrome.  Plan to go to OR emergently for evacuation of hematoma, possible bypass thrombectomy, fasciotomies, VAC placement. Voiding well with galeano.  Pt is in the OR.  WBC elevated 11.67.      ++++++++++++++++++++++  1/17 OPERATIVE NOTE  Preop Diagnosis:  Popliteal artery aneurysm, bilateral (HCC) [I72.4]     Post-Op Diagnosis Codes:     * Popliteal artery aneurysm, bilateral (HCC) [I72.4]     Procedure(s):  Left - EVACUATION/ DRAINAGE HEMATOMA. EXPLORATION ON LEFT LEG BYPASS. CONTROLL OF BLEEDING    Estimated Blood Loss: 350 mL    Anesthesia: General     Operative Findings:     -Moderated amount of hematoma evacuated from the below knee pop exposure site.  The distal anastomosis was fully exposed and noted to be intact with no signs of active  "bleeding. No other active bleeding source was identified. There was minor generalized oozing from surrounding tissues.     -The hematoma appeared to track up through the anatomic tunnel/tract for the bypass.  As such the distal thigh (proximal anastomotic site) incision was reopened. Additional hematoma evacuated. Dissection was carried down to the proximal anastomotic site. There was evidence of active ooze from the toe of the anastomosis. This was controlled with a pledgeted 6-0 prolene suture. Additional topical hemostatic agent to include thrombin/gelfoam as well floseal employed with good hemostasis.     -The bypass was patent and easily palpable at conclusion of case.     -The distal thigh incision was re approximated with interrupted vertical mattress 2-0 nylon suture.     -The below knee pop incision site was partially re approximated with nylon suture.  The mid portion was left open due to concern of excessive tension and VAC.  The wound dimension left open measured approximately 5cm x 3cm x 3cm    Admission Orders: Date/Time/Statement:   Admission Orders (From admission, onward)       Ordered        01/16/24 1330  Inpatient Admission  Once                          Orders Placed This Encounter   Procedures    Inpatient Admission     Standing Status:   Standing     Number of Occurrences:   1     Order Specific Question:   Level of Care     Answer:   Level 1 Stepdown [13]     Order Specific Question:   Estimated length of stay     Answer:   More than 2 Midnights     Order Specific Question:   Certification     Answer:   I certify that inpatient services are medically necessary for this patient for a duration of greater than two midnights. See H&P and MD Progress Notes for additional information about the patient's course of treatment.     Vital Signs: /86 (BP Location: Right arm)   Pulse 67   Temp 97.6 °F (36.4 °C)   Resp 14   Ht 5' 11\" (1.803 m)   Wt 103 kg (228 lb)   SpO2 96%   BMI 31.80 kg/m² "     Pertinent Labs/Diagnostic Test Results:         Results from last 7 days   Lab Units 01/17/24  0247 01/16/24  1348 01/13/24  0747   WBC Thousand/uL 11.67* 8.44 6.25   HEMOGLOBIN g/dL 12.4 15.7 16.8   HEMATOCRIT % 36.8 46.4 50.1*   PLATELETS Thousands/uL 167 150 180         Results from last 7 days   Lab Units 01/17/24  0247 01/16/24  1348 01/13/24  0747   SODIUM mmol/L 134* 137 137   POTASSIUM mmol/L 4.3 4.6 4.2   CHLORIDE mmol/L 104 108 105   CO2 mmol/L 23 22 26   ANION GAP mmol/L 7 7 6   BUN mg/dL 19 17 17   CREATININE mg/dL 0.92 0.85 1.08   EGFR ml/min/1.73sq m 91 96 75   CALCIUM mg/dL 8.4 8.7 9.6         Results from last 7 days   Lab Units 01/16/24  1349   POC GLUCOSE mg/dl 115     Results from last 7 days   Lab Units 01/17/24  0247 01/16/24  1348   GLUCOSE RANDOM mg/dL 126 116     Results from last 7 days   Lab Units 01/17/24  0247 01/16/24 2011   PTT seconds 83* 31     Diet: NPO  Mobility: UP as pierre  DVT Prophylaxis: SCD, Heparin drip now d/c, ASA    Medications/Pain Control:   Scheduled Medications:  acetaminophen, 975 mg, Oral, Q8H NAWAF  aspirin, 81 mg, Oral, Daily  heparin (porcine) 4,000 Units, papaverine 120 mg in multi-electrolyte (PLASMALYTE-A/ISOLYTE-S PH 7.4) 1,000 mL irrigation, , Irrigation, Once  nicotine, 1 patch, Transdermal, Q24H  pravastatin, 80 mg, Oral, Daily With Dinner      Continuous IV Infusions:  Heparin drip  - d/c 1/17  IV LR @ 100 ml/hr - d/c 1/17     PRN Meds:  HYDROmorphone, 0.5 mg, Intravenous, Q4H PRN  ondansetron, 4 mg, Intravenous, Q6H PRN  oxyCODONE, 10 mg, Oral, Q4H PRN - x 2 1/16, 1/17  oxyCODONE, 5 mg, Oral, Q4H PRN    Level 1 SD     Network Utilization Review Department  ATTENTION: Please call with any questions or concerns to [phone] and carefully listen to the prompts so that you are directed to the right person. All voicemails are confidential.   For Discharge needs, contact Care Management DC Support Team at [phone] opt. 2  Send all requests for admission  clinical reviews, approved or denied determinations and any other requests to dedicated fax number below belonging to the campus where the patient is receiving treatment. List of dedicated fax numbers for the Facilities:  FACILITY NAME UR FAX NUMBER   ADMISSION DENIALS (Administrative/Medical Necessity) [phone]   DISCHARGE SUPPORT TEAM (NETWORK) [phone]   PARENT CHILD HEALTH (Maternity/NICU/Pediatrics) [phone]   [facility] [phone]   [facility] [phone]   [facility] [phone]   [facility] [phone]   [facility] [phone]   [facility] [phone]   [facility] [phone]   [facility] [phone]   [facility] [phone]   [facility] [phone]   [facility] [phone]

## 2024-01-17 NOTE — OCCUPATIONAL THERAPY NOTE
Occupational Therapy cx        Patient Name: Shilo Olmos  Today's Date: 1/17/2024 01/17/24 0810   OT Last Visit   OT Visit Date 01/17/24   Note Type   Note type Evaluation   Cancel Reasons Medical status   Additional Comments Pt returning to OR today and is with active bed rest this morning. will hold and address as clinical course allows.         Christina Diggs, KATIA, OTR/L

## 2024-01-18 ENCOUNTER — ANESTHESIA EVENT (INPATIENT)
Dept: PERIOP | Facility: HOSPITAL | Age: 59
DRG: 253 | End: 2024-01-18
Payer: COMMERCIAL

## 2024-01-18 ENCOUNTER — DOCUMENTATION (OUTPATIENT)
Dept: VASCULAR SURGERY | Facility: CLINIC | Age: 59
End: 2024-01-18

## 2024-01-18 LAB
ANION GAP SERPL CALCULATED.3IONS-SCNC: 6 MMOL/L
APTT PPP: 24 SECONDS (ref 23–37)
APTT PPP: 83 SECONDS (ref 23–37)
APTT PPP: 83 SECONDS (ref 23–37)
BUN SERPL-MCNC: 19 MG/DL (ref 5–25)
CALCIUM SERPL-MCNC: 8.1 MG/DL (ref 8.4–10.2)
CHLORIDE SERPL-SCNC: 108 MMOL/L (ref 96–108)
CO2 SERPL-SCNC: 24 MMOL/L (ref 21–32)
CREAT SERPL-MCNC: 0.8 MG/DL (ref 0.6–1.3)
ERYTHROCYTE [DISTWIDTH] IN BLOOD BY AUTOMATED COUNT: 13.3 % (ref 11.6–15.1)
ERYTHROCYTE [DISTWIDTH] IN BLOOD BY AUTOMATED COUNT: 13.5 % (ref 11.6–15.1)
GFR SERPL CREATININE-BSD FRML MDRD: 98 ML/MIN/1.73SQ M
GLUCOSE SERPL-MCNC: 121 MG/DL (ref 65–140)
HCT VFR BLD AUTO: 27.9 % (ref 36.5–49.3)
HCT VFR BLD AUTO: 28.4 % (ref 36.5–49.3)
HGB BLD-MCNC: 9.3 G/DL (ref 12–17)
HGB BLD-MCNC: 9.5 G/DL (ref 12–17)
INR PPP: 1.1 (ref 0.84–1.19)
MCH RBC QN AUTO: 30.4 PG (ref 26.8–34.3)
MCH RBC QN AUTO: 30.4 PG (ref 26.8–34.3)
MCHC RBC AUTO-ENTMCNC: 33.3 G/DL (ref 31.4–37.4)
MCHC RBC AUTO-ENTMCNC: 33.5 G/DL (ref 31.4–37.4)
MCV RBC AUTO: 91 FL (ref 82–98)
MCV RBC AUTO: 91 FL (ref 82–98)
PLATELET # BLD AUTO: 136 THOUSANDS/UL (ref 149–390)
PLATELET # BLD AUTO: 156 THOUSANDS/UL (ref 149–390)
PMV BLD AUTO: 10 FL (ref 8.9–12.7)
PMV BLD AUTO: 10.1 FL (ref 8.9–12.7)
POTASSIUM SERPL-SCNC: 4 MMOL/L (ref 3.5–5.3)
PROTHROMBIN TIME: 14.1 SECONDS (ref 11.6–14.5)
RBC # BLD AUTO: 3.06 MILLION/UL (ref 3.88–5.62)
RBC # BLD AUTO: 3.13 MILLION/UL (ref 3.88–5.62)
SODIUM SERPL-SCNC: 138 MMOL/L (ref 135–147)
WBC # BLD AUTO: 9.04 THOUSAND/UL (ref 4.31–10.16)
WBC # BLD AUTO: 9.42 THOUSAND/UL (ref 4.31–10.16)

## 2024-01-18 PROCEDURE — 80048 BASIC METABOLIC PNL TOTAL CA: CPT | Performed by: STUDENT IN AN ORGANIZED HEALTH CARE EDUCATION/TRAINING PROGRAM

## 2024-01-18 PROCEDURE — 85730 THROMBOPLASTIN TIME PARTIAL: CPT | Performed by: STUDENT IN AN ORGANIZED HEALTH CARE EDUCATION/TRAINING PROGRAM

## 2024-01-18 PROCEDURE — 97163 PT EVAL HIGH COMPLEX 45 MIN: CPT

## 2024-01-18 PROCEDURE — 97167 OT EVAL HIGH COMPLEX 60 MIN: CPT

## 2024-01-18 PROCEDURE — 85730 THROMBOPLASTIN TIME PARTIAL: CPT | Performed by: SURGERY

## 2024-01-18 PROCEDURE — 85730 THROMBOPLASTIN TIME PARTIAL: CPT | Performed by: PHYSICIAN ASSISTANT

## 2024-01-18 PROCEDURE — 99024 POSTOP FOLLOW-UP VISIT: CPT | Performed by: SURGERY

## 2024-01-18 PROCEDURE — 85610 PROTHROMBIN TIME: CPT | Performed by: STUDENT IN AN ORGANIZED HEALTH CARE EDUCATION/TRAINING PROGRAM

## 2024-01-18 PROCEDURE — 85027 COMPLETE CBC AUTOMATED: CPT | Performed by: STUDENT IN AN ORGANIZED HEALTH CARE EDUCATION/TRAINING PROGRAM

## 2024-01-18 RX ORDER — SODIUM CHLORIDE, SODIUM GLUCONATE, SODIUM ACETATE, POTASSIUM CHLORIDE, MAGNESIUM CHLORIDE, SODIUM PHOSPHATE, DIBASIC, AND POTASSIUM PHOSPHATE .53; .5; .37; .037; .03; .012; .00082 G/100ML; G/100ML; G/100ML; G/100ML; G/100ML; G/100ML; G/100ML
125 INJECTION, SOLUTION INTRAVENOUS CONTINUOUS
Status: DISCONTINUED | OUTPATIENT
Start: 2024-01-19 | End: 2024-01-20

## 2024-01-18 RX ORDER — HEPARIN SODIUM 10000 [USP'U]/100ML
3-30 INJECTION, SOLUTION INTRAVENOUS
Status: DISCONTINUED | OUTPATIENT
Start: 2024-01-18 | End: 2024-01-19

## 2024-01-18 RX ORDER — HEPARIN SODIUM 1000 [USP'U]/ML
4000 INJECTION, SOLUTION INTRAVENOUS; SUBCUTANEOUS EVERY 6 HOURS PRN
Status: DISCONTINUED | OUTPATIENT
Start: 2024-01-18 | End: 2024-01-19

## 2024-01-18 RX ORDER — HEPARIN SODIUM 1000 [USP'U]/ML
8000 INJECTION, SOLUTION INTRAVENOUS; SUBCUTANEOUS ONCE
Status: DISCONTINUED | OUTPATIENT
Start: 2024-01-18 | End: 2024-01-19

## 2024-01-18 RX ORDER — HEPARIN SODIUM 1000 [USP'U]/ML
8000 INJECTION, SOLUTION INTRAVENOUS; SUBCUTANEOUS EVERY 6 HOURS PRN
Status: DISCONTINUED | OUTPATIENT
Start: 2024-01-18 | End: 2024-01-19

## 2024-01-18 RX ADMIN — OXYCODONE HYDROCHLORIDE 5 MG: 5 TABLET ORAL at 04:52

## 2024-01-18 RX ADMIN — HYDROMORPHONE HYDROCHLORIDE 0.5 MG: 1 INJECTION, SOLUTION INTRAMUSCULAR; INTRAVENOUS; SUBCUTANEOUS at 16:06

## 2024-01-18 RX ADMIN — HEPARIN SODIUM 18 UNITS/KG/HR: 10000 INJECTION, SOLUTION INTRAVENOUS at 09:24

## 2024-01-18 RX ADMIN — ACETAMINOPHEN 975 MG: 325 TABLET, FILM COATED ORAL at 04:53

## 2024-01-18 RX ADMIN — OXYCODONE HYDROCHLORIDE 10 MG: 10 TABLET ORAL at 16:37

## 2024-01-18 RX ADMIN — HEPARIN SODIUM 8000 UNITS: 1000 INJECTION INTRAVENOUS; SUBCUTANEOUS at 09:25

## 2024-01-18 RX ADMIN — SODIUM CHLORIDE, SODIUM GLUCONATE, SODIUM ACETATE, POTASSIUM CHLORIDE, MAGNESIUM CHLORIDE, SODIUM PHOSPHATE, DIBASIC, AND POTASSIUM PHOSPHATE 125 ML/HR: .53; .5; .37; .037; .03; .012; .00082 INJECTION, SOLUTION INTRAVENOUS at 23:55

## 2024-01-18 RX ADMIN — CEFAZOLIN SODIUM 1000 MG: 1 SOLUTION INTRAVENOUS at 02:02

## 2024-01-18 RX ADMIN — ASPIRIN 81 MG CHEWABLE TABLET 81 MG: 81 TABLET CHEWABLE at 08:02

## 2024-01-18 RX ADMIN — HEPARIN SODIUM 5000 UNITS: 5000 INJECTION INTRAVENOUS; SUBCUTANEOUS at 04:53

## 2024-01-18 RX ADMIN — PRAVASTATIN SODIUM 80 MG: 80 TABLET ORAL at 16:06

## 2024-01-18 RX ADMIN — LOSARTAN POTASSIUM 100 MG: 50 TABLET, FILM COATED ORAL at 08:02

## 2024-01-18 RX ADMIN — ACETAMINOPHEN 975 MG: 325 TABLET, FILM COATED ORAL at 21:38

## 2024-01-18 RX ADMIN — OXYCODONE HYDROCHLORIDE 10 MG: 10 TABLET ORAL at 12:30

## 2024-01-18 RX ADMIN — ACETAMINOPHEN 975 MG: 325 TABLET, FILM COATED ORAL at 13:49

## 2024-01-18 NOTE — PROGRESS NOTES
Vascular Nurse Navigator Post Op Education    Met with patient and wife Jerry at bedside to introduce myself as Vascular Nurse Navigator and explained my role.  Patient is appropriate and accepting to education. Patient was educated with Review of written materials provided, Teachback, Explanation, Demonstration, and Question & Answer on expectations of post op care and recovery on Left SFA to BK Pop rGSV bypass c Pop aneurysm exclusion and Evacuation of hematoma with wound vac placement and possible closure. Patient is a smoker  (1/4 ppd x 32 yrs), as such Smoking effects on the lungs, tobacco triggers, and Smoking cessation was reviewed. Education provided to patient and his wife Jerry on infection prevention, activity limitations, when to call the office, importance of follow up, and incisional care.  Discharge instruction handout provided to patient to review.        Tobacco use is a significant patient-modifiable risk factor for this patient’s vascular disease with multiple vascular comorbidities, and a significant risk factor for failure of and complications from any endovascular or surgical interventions.    I explained to the patient the effects of smoking including peripheral artery disease, coronary artery disease, cerebrovascular disease as well as cancer and chronic obstructive pulmonary disease. I asked the patient to stop smoking immediately. It is never too late to quit, and many studies show significant health benefits as well as economical savings after smoking cessation. I offered to the patient nicotine replacement therapy as well as referral to the smoking cessation program and access to the quit line 7-144-ARGNTSK or ambulatory referral to our network smoking cessation program.    Based on our conversation, this patient appears motivated to quit  And plans to quit without nicotine replacement or medications    The patient set a quit date of 1/14/24 .  Per patient's wife, he was only smoking 1  -2 cigarettes a day and last smoked on the Sunday prior to admission and has made the decision to quit prior to surgery.    I spent approximately 5 minutes on tobacco cessation counseling with this patient.

## 2024-01-18 NOTE — OCCUPATIONAL THERAPY NOTE
Occupational Therapy Evaluation     Patient Name: Shilo Olmos  Today's Date: 1/18/2024  Problem List  Active Problems:  There are no active Hospital Problems.    Past Medical History  Past Medical History:   Diagnosis Date    Hypertension     Psoriasis      Past Surgical History  Past Surgical History:   Procedure Laterality Date    EVACUATION OF HEMATOMA Left 1/17/2024    Procedure: EVACUATION/ DRAINAGE HEMATOMA, EXPLORATION ON LEFT LEG BYPASS, CONTROLL OF BLEEDING;  Surgeon: Mariana Vargas DO;  Location: [facility];  Service: Vascular    KNEE SURGERY Bilateral     GA BYPASS W/VEIN FEMORAL-POPLITEAL Left 1/16/2024    Procedure: BYPASS FEMORAL-POPLITEAL -Left above knee popliteal to below knee popliteal artery bypass WITH REVERSE SAPHENOUS VEIN GRAFT; EXCLUSION OF POPLITEAL ANEURYSM;  Surgeon: Parker Valdez MD;  Location: [facility];  Service: Vascular         01/18/24 0830   OT Last Visit   OT Visit Date 01/18/24   Note Type   Note type Evaluation   Pain Assessment   Pain Assessment Tool 0-10   Pain Score 10 - Worst Possible Pain   Pain Location/Orientation Orientation: Left;Location: Leg   Patient's Stated Pain Goal No pain   Hospital Pain Intervention(s) Repositioned;Ambulation/increased activity;Emotional support   Restrictions/Precautions   Weight Bearing Precautions Per Order No   Other Precautions Fall Risk;Pain;Multiple lines;Telemetry  (a-line)   Home Living   Type of Home House   Home Layout Two level;Stairs to enter with rails;Bed/bath upstairs  (3 ирина)   Bathroom Shower/Tub Walk-in shower   Bathroom Toilet Standard   Bathroom Equipment Shower chair   Bathroom Accessibility Accessible   Home Equipment Crutches;Other (Comment)  (walking stick. was not using either)   Prior Function   Level of [place] Independent with ADLs;Independent with functional mobility;Independent with IADLS   Lives With Spouse   Receives Help From Family   IADLs Independent with driving;Independent  "with meal prep;Independent with medication management   Falls in the last 6 months 0   Vocational Full time employment   Lifestyle   Autonomy Pta, pt was I w ADL/IADL, no AD mobility. +    Reciprocal Relationships supportive spouse who can provide assistance as needed.   Service to Others fte   Intrinsic Gratification spending time with his family.   Subjective   Subjective \"I have to go to the bathroom\"   ADL   Where Assessed Edge of bed   Eating Assistance 6  Modified independent   Grooming Assistance 6  Modified Independent   UB Bathing Assistance 5  Supervision/Setup   LB Bathing Assistance 3  Moderate Assistance   UB Dressing Assistance 5  Supervision/Setup   LB Dressing Assistance 3  Moderate Assistance   Toileting Assistance  5  Supervision/Setup   Functional Assistance 4  Minimal Assistance   Bed Mobility   Supine to Sit 4  Minimal assistance   Additional items Assist x 1;Increased time required;LE management;Verbal cues   Additional Comments found in bed, left in chair w all needs in reach and LLE elevated.   Transfers   Sit to Stand 4  Minimal assistance   Additional items Assist x 1;Increased time required   Stand to Sit 4  Minimal assistance   Additional items Assist x 1;Increased time required   Additional Comments rw   Functional Mobility   Functional Mobility 4  Minimal assistance   Additional Comments ax1, short distance EOB<>Bath   Additional items Rolling walker   Balance   Static Sitting Good   Dynamic Sitting Fair +   Static Standing Fair   Dynamic Standing Fair -   Ambulatory Poor +   Activity Tolerance   Activity Tolerance Patient tolerated treatment well   Medical Staff Made Aware DPT Addis 2' pts med complexity, comorbidities and regression from baseline.   Nurse Made Aware ok per RN   RUE Assessment   RUE Assessment WFL   LUE Assessment   LUE Assessment WFL   Hand Function   Gross Motor Coordination Functional   Fine Motor Coordination Functional   Psychosocial   Psychosocial (WDL) WDL "   Cognition   Overall Cognitive Status WFL   Arousal/Participation Alert;Responsive;Cooperative   Attention Within functional limits   Orientation Level Oriented X4   Memory Within functional limits   Following Commands Follows all commands and directions without difficulty   Comments pt pleasant and cooperative with overall G safety awareness and insight to condition t/o session.   Assessment   Limitation Decreased ADL status;Decreased endurance;Decreased self-care trans;Decreased high-level ADLs   Prognosis Good   Assessment Pt is a 58 y.o. male admitted 1/16/24 for elective LLE revascularization and exclusion of popliteal aneurysm. Pt underwent said procedure on date of admission, then was brought back to the OR for evacuation of hematoma and control of active persistent oozing from toe of proximal anastomosis. Pt is POD 2 and 1, respectively. Pt w active OT eval and treat orders. PMH includes  has a past medical history of Hypertension and Psoriasis. Pt lives w spouse in a 2 SH with 3 ирина, reports bed/bath upstairs which includes walk in shower with shower chair and standard toilet. Pta, pt was independent w/ ADL/IADL and functional mobility, was driving and was not using any DME at baseline. Currently, pt is Supervision for UB ADL, Mod Ax1 for LB ADL, and completed transfers/FM w Min Ax1. Pt is limited at this time 2* decreased endurance/activtiy tolerance, decreased cognition, decreased ADL/High-level ADL status, decreased self-care trans, decreased safety awareness, limited home support and is a fall risk. This impacts pt's ability to complete UB and LB dressing and bathing, toileting, transfers, functional mobility, community mobility, home and health maintenance, and safe engagement in typical daily routine. The patient's raw score on the AM-PAC Daily Activity inpatient short form is 21, standardized score is 44.27, greater than 39.4. Patients at this level are likely to benefit from discharge to home.  Please refer to the recommendation of the Occupational Therapist for safe discharge planning.  From OT standpoint, pt should D/C to home  when medically stable. Pt will benefit from continued acute OT services 2-3 x/wk for 10-14 days to meet goals.   Goals   Patient Goals get home/less pain   Discharge Recommendation   Rehab Resource Intensity Level, OT No post-acute rehabilitation needs   Additional Comments  anticipate no needs as pts clinical status/pain improves.   AM-PAC Daily Activity Inpatient   Lower Body Dressing 3   Bathing 3   Toileting 3   Upper Body Dressing 4   Grooming 4   Eating 4   Daily Activity Raw Score 21   Daily Activity Standardized Score (Calc for Raw Score >=11) 44.27   AM-PAC Applied Cognition Inpatient   Following a Speech/Presentation 4   Understanding Ordinary Conversation 4   Taking Medications 4   Remembering Where Things Are Placed or Put Away 4   Remembering List of 4-5 Errands 4   Taking Care of Complicated Tasks 4   Applied Cognition Raw Score 24   Applied Cognition Standardized Score 62.21   Modified [place] Scale   Modified [place] Scale 3   End of Consult   Education Provided Yes   Patient Position at End of Consult Bedside chair;All needs within reach   Nurse Communication Nurse aware of consult     Pt will complete functional mobility with Mod I using appropriate DME as needed.     Pt will complete UB dressing and bathing with Mod I using appropriate DME as needed.     Pt will complete LB dressing and bathing with Mod I using appropriate DME as needed.    Pt will complete transfers with Mod I using appropriate DME as needed.     Pt will complete toileting with Mod I using appropriate DME as needed.     Pt will complete home maintenance task with Mod I using appropriate DME as needed.     Pt will utilize energy conservation techniques throughout functional activity/ADL s/p skilled education.     Pt will demonstrate increased safety awareness during functional tasks/ADL's s/p  skilled education.     Pt will increase activity tolerance to 30 minutes in order to complete ADL's/ functional tasks, using appropriate DME as needed.       Christina Diggs, KATIA, OTR/L

## 2024-01-18 NOTE — PROGRESS NOTES
Progress Note - Vascular Surgery   Shilo Olmos 58 y.o. male [id number]  Unit/Bed#:[facility] 519-01 Encounter: [id number]      Assessment:    58 y.o. with PMH HTN, Psoriasis, B/L Knee surgery presenting with Left Popliteal aneurysm (2.45cm) for an elective left leg revascularization and exclusion of popliteal aneurysm.      1/16: Left SFA to BK Pop rGSV bypass c Pop aneurysm exclusion  1/17     Plan:  - Left leg warm, well perfused, viable   - edema, swelling significantly reduced   - symptoms resolved   - Cont NV chks   - Left leg palpable DP/PT   - motor and sensory intact  - Diet: as tolerated  - Will resume Hep gtt and monitor overnight   - Abx: None  - VTEppx: Hep gtt  - Will plan to discharge on Eliquis  - Cont Aspirin, Statin    Subjective:    Pt s/e. No acute events overnight. Pt awake, alert.    Pt complains of pain in calf muslce, incisional pain. Tolerating diet. Not OOB. Voiding    States numbness, foot symptoms resolved     No new complaints. Denies n/v, sob, chest pain, f/c.     I/Os:  I/O last 3 completed shifts:  In: 7097.9 [P.O.:3428; I.V.:3169.9; IV Piggyback:500]  Out: 7025 [Urine:6575; Drains:100; Blood:350]  No intake/output data recorded.    Review of Systems   All other systems reviewed and are negative.      Vitals:    01/18/24 0702   BP:    Pulse:    Resp:    Temp:    SpO2: 96%        Physical Exam  Vitals and nursing note reviewed.   Constitutional:       General: He is not in acute distress.     Appearance: He is well-developed. He is not ill-appearing, toxic-appearing or diaphoretic.   HENT:      Head: Normocephalic and atraumatic.   Eyes:      Conjunctiva/sclera: Conjunctivae normal.   Cardiovascular:      Rate and Rhythm: Normal rate and regular rhythm.      Heart sounds: No murmur heard.     Comments: B/L Palp fem  LLE Palp DP/PT  Pulmonary:      Effort: Pulmonary effort is normal. No respiratory distress.      Breath sounds: Normal breath sounds.   Abdominal:      Palpations: Abdomen  "is soft.      Tenderness: There is no abdominal tenderness. There is no guarding.   Musculoskeletal:         General: No swelling.      Cervical back: Neck supple.      Right lower leg: No edema.      Left lower leg: Edema present.      Comments: Surgical incisions c/d/I, no bleeding, no hematoma    Left calf vac in place, functioning     Left Leg anterior/lateral compartment edematous  Left leg posterior compartments edematous, tender with some ecchymosis    Skin:     General: Skin is warm and dry.      Capillary Refill: Capillary refill takes less than 2 seconds.   Neurological:      General: No focal deficit present.      Mental Status: He is alert and oriented to person, place, and time. Mental status is at baseline.   Psychiatric:         Mood and Affect: Mood normal.         Imaging:I have personally reviewed pertinent reports.        Lab Results and Cultures:   Lab Results   Component Value Date    WBC 9.04 01/18/2024    HGB 9.5 (L) 01/18/2024    HCT 28.4 (L) 01/18/2024    MCV 91 01/18/2024     (L) 01/18/2024     Lab Results   Component Value Date    CALCIUM 8.1 (L) 01/18/2024    K 4.0 01/18/2024    CO2 24 01/18/2024     01/18/2024    BUN 19 01/18/2024    CREATININE 0.80 01/18/2024     No results found for: \"INR\", \"PROTIME\"     Blood Culture: No results found for: \"BLOODCX\",   Urinalysis: No results found for: \"COLORU\", \"CLARITYU\", \"SPECGRAV\", \"PHUR\", \"LEUKOCYTESUR\", \"NITRITE\", \"PROTEINUA\", \"GLUCOSEU\", \"KETONESU\", \"BILIRUBINUR\", \"BLOODU\",   Urine Culture: No results found for: \"URINECX\",   Wound Culure: No results found for: \"WOUNDCULT\"    Medications:  Current Facility-Administered Medications   Medication Dose Route Frequency    acetaminophen (TYLENOL) tablet 975 mg  975 mg Oral Q8H NAWAF    aspirin chewable tablet 81 mg  81 mg Oral Daily    ceFAZolin (ANCEF) IVPB (premix in dextrose) 2,000 mg 50 mL  2,000 mg Intravenous Once    heparin (porcine) subcutaneous injection 5,000 Units  5,000 Units " Subcutaneous Q8H NAWAF    hydrALAZINE (APRESOLINE) injection 5 mg  5 mg Intravenous Q6H PRN    HYDROmorphone (DILAUDID) injection 0.5 mg  0.5 mg Intravenous Q4H PRN    lactated ringers bolus 500 mL  500 mL Intravenous Once PRN    And    lactated ringers bolus 500 mL  500 mL Intravenous Once PRN    losartan (COZAAR) tablet 100 mg  100 mg Oral Daily    nicotine (NICODERM CQ) 21 mg/24 hr TD 24 hr patch 1 patch  1 patch Transdermal Q24H    ondansetron (ZOFRAN) injection 4 mg  4 mg Intravenous Q6H PRN    oxyCODONE (ROXICODONE) immediate release tablet 10 mg  10 mg Oral Q4H PRN    oxyCODONE (ROXICODONE) IR tablet 5 mg  5 mg Oral Q4H PRN    pravastatin (PRAVACHOL) tablet 80 mg  80 mg Oral Daily With Dinner    sodium chloride 0.9 % bolus 500 mL  500 mL Intravenous Once PRN    And    sodium chloride 0.9 % bolus 500 mL  500 mL Intravenous Once PRN       Patient Active Problem List   Diagnosis    Essential hypertension    Insect bite of lower leg with local reaction, right, initial encounter    Leg swelling    Gout of left foot    Primary osteoarthritis of left knee    Acute deep vein thrombosis (DVT) of tibial vein of right lower extremity (HCC)    Popliteal artery aneurysm, bilateral (HCC)    Infrarenal abdominal aortic aneurysm (AAA) without rupture (HCC)       Past Surgical History:   Procedure Laterality Date    KNEE SURGERY Bilateral     CO BYPASS W/VEIN FEMORAL-POPLITEAL Left 1/16/2024    Procedure: BYPASS FEMORAL-POPLITEAL -Left above knee popliteal to below knee popliteal artery bypass WITH REVERSE SAPHENOUS VEIN GRAFT; EXCLUSION OF POPLITEAL ANEURYSM;  Surgeon: Parker Valdez MD;  [facility];  Service: Vascular       Family History   Problem Relation Age of Onset    Hypertension Mother     No Known Problems Father        Social History     Socioeconomic History    Marital status: /Civil Union     Spouse name: Not on file    Number of children: Not on file    Years of education: Not on file     Highest education level: Not on file   Occupational History    Not on file   Tobacco Use    Smoking status: Every Day     Current packs/day: 0.25     Average packs/day: 0.3 packs/day for 32.0 years (8.0 ttl pk-yrs)     Types: Cigarettes    Smokeless tobacco: Never    Tobacco comments:     1 PPD x 15 years - As per [place]    Vaping Use    Vaping status: Never Used   Substance and Sexual Activity    Alcohol use: Yes     Comment: rare social use one time per month    Drug use: Never    Sexual activity: Yes     Partners: Female   Other Topics Concern    Not on file   Social History Narrative    Current some day smoker - As per [employer]         · Most recent tobacco use screening:   10-      · Do you currently or have you served in the [employer]:   No     As per [place]      Social Determinants of Health     Financial Resource Strain: Not on file   Food Insecurity: No Food Insecurity (1/17/2024)    Hunger Vital Sign     Worried About Running Out of Food in the Last Year: Never true     Ran Out of Food in the Last Year: Never true   Transportation Needs: No Transportation Needs (1/17/2024)    PRAPARE - Transportation     Lack of Transportation (Medical): No     Lack of Transportation (Non-Medical): No   Physical Activity: Not on file   Stress: Not on file   Social Connections: Not on file   Intimate Partner Violence: Not on file   Housing Stability: Low Risk  (1/17/2024)    Housing Stability Vital Sign     Unable to Pay for Housing in the Last Year: No     Number of Places Lived in the Last Year: 1     Unstable Housing in the Last Year: No       No Known Allergies

## 2024-01-18 NOTE — PHYSICAL THERAPY NOTE
Physical Therapy Evaluation     Patient's Name: Shilo Olmos    Admitting Diagnosis  Popliteal artery aneurysm, bilateral (HCC) [I72.4]    Problem List  Patient Active Problem List   Diagnosis    Essential hypertension    Insect bite of lower leg with local reaction, right, initial encounter    Leg swelling    Gout of left foot    Primary osteoarthritis of left knee    Acute deep vein thrombosis (DVT) of tibial vein of right lower extremity (HCC)    Popliteal artery aneurysm, bilateral (HCC)    Infrarenal abdominal aortic aneurysm (AAA) without rupture (HCC)       Past Medical History  Past Medical History:   Diagnosis Date    Hypertension     Psoriasis        Past Surgical History  Past Surgical History:   Procedure Laterality Date    EVACUATION OF HEMATOMA Left 1/17/2024    Procedure: EVACUATION/ DRAINAGE HEMATOMA, EXPLORATION ON LEFT LEG BYPASS, CONTROLL OF BLEEDING;  Surgeon: Mariana Vargas DO;  [facility];  Service: Vascular    KNEE SURGERY Bilateral     OR BYPASS W/VEIN FEMORAL-POPLITEAL Left 1/16/2024    Procedure: BYPASS FEMORAL-POPLITEAL -Left above knee popliteal to below knee popliteal artery bypass WITH REVERSE SAPHENOUS VEIN GRAFT; EXCLUSION OF POPLITEAL ANEURYSM;  Surgeon: Parker Valdez MD;  [facility];  Service: Vascular        01/18/24 0829   PT Last Visit   PT Visit Date 01/18/24   Note Type   Note type Evaluation   Pain Assessment   Pain Assessment Tool 0-10   Pain Score 10 - Worst Possible Pain   Pain Location/Orientation Orientation: Left   Hospital Pain Intervention(s) Ambulation/increased activity;Repositioned;Elevated;Emotional support   Restrictions/Precautions   Weight Bearing Precautions Per Order No   Braces or Orthoses   (none)   Other Precautions Multiple lines;Telemetry;Pain;Fall Risk  (wound vac, william)   Home Living   Type of Home House   Home Layout Two level;Able to live on main level with bedroom/bathroom;Stairs to enter with rails  (3  "ирина)   Bathroom Shower/Tub Walk-in shower   Bathroom Toilet Standard   Bathroom Equipment Shower chair   Bathroom Accessibility Accessible   Home Equipment Crutches;Other (Comment)  (walking stick)   Prior Function   Level of [place] Independent with ADLs;Independent with functional mobility;Independent with IADLS   Lives With Spouse   Receives Help From Family   IADLs Independent with driving;Independent with meal prep;Independent with medication management   Falls in the last 6 months 0   Vocational Full time employment   General   Additional Pertinent History 58 y.o. male admitted to [facility] on 1/16/2024 for the following planned procedure:  left SFA to BK pop r GSV bypass with pop aneurysm exclusion. On 1/17 patient returned to OR for evacuation of hematoma and does have wound vac. Per patient, he is planned to return to OR on 1/19 additional vascular procedure.   Family/Caregiver Present No   Cognition   Overall Cognitive Status WFL   Orientation Level Oriented X4   Subjective   Subjective \"I would like to use the bathroom\"   RUE Assessment   RUE Assessment WFL   LUE Assessment   LUE Assessment WFL   RLE Assessment   RLE Assessment WFL   LLE Assessment   LLE Assessment X  (limited by pain; grossly 3/5)   Bed Mobility   Supine to Sit 4  Minimal assistance   Additional items Assist x 1;HOB elevated;Increased time required;LE management   Sit to Supine Unable to assess   Additional Comments patient remained OOB in recliner with LE elevated post evaluation   Transfers   Sit to Stand 4  Minimal assistance   Additional items Assist x 1;Increased time required;Verbal cues   Stand to Sit 4  Minimal assistance   Additional items Assist x 1;Increased time required;Verbal cues   Additional Comments rw   Ambulation/Elevation   Gait pattern Excessively slow;Short stride;Antalgic   Gait Assistance 4  Minimal assist   Additional items Assist x 1  (2nd person for lines)   Assistive Device Rolling walker "   Distance 5 feet x 2   Balance   Static Sitting Good   Static Standing Fair   Ambulatory Poor +   Endurance Deficit   Endurance Deficit Yes   Endurance Deficit Description pain   Activity Tolerance   Activity Tolerance Patient limited by fatigue;Patient limited by pain   Medical Staff Made Aware This high complexity evaluation was performed with an occupational therapist due to the patient's co-morbidities, clinically unstable presentation, and present impairments which are a regression from the patient's baseline.   Nurse Made Aware austyn to see per RN   Assessment   Prognosis Good   Problem List Decreased strength;Decreased endurance;Impaired balance;Decreased mobility;Pain;Decreased skin integrity   Assessment PT completed evaluation of 58 y.o. male admitted to [facility] on 1/16/2024 for the following planned procedure:  left SFA to BK pop r GSV bypass with pop aneurysm exclusion. On 1/17 patient returned to OR for evacuation of hematoma and does have wound vac. Per patient, he is planned to return to OR on 1/19 additional vascular procedure.     Patient's current status instabilities include ongoing pain, wound vac, step down patient, continuous O2/HR monitoring, use of new AD, and a regression in function from baseline.  PMH is significant for HTN, AAA, and popliteal artery aneurysm. Prior to this admission patient resided with spouse in a 2 level home (3 PORSCHE, 1/2 bath on first floor + couch). At his baseline he is I with mobility (no use of AD), ADLs, and iADLS. +  and employment.     Patient presents at time of PT evaluation functioning below baseline and currently w/ overall mobility deficits 2* to: impaired balance, decreased skin integrity, gait deviations, decreased activity tolerance and fall risk.  During PT evaluation, patient currently is requiring min-AX1 for bed mobility, transfers, and ambulation. With use of RW patient walked 5 feet x 2 (bed-->toilet-->chair) presenting with  antalgic, step to gait pattern.     Anticipate with improvement in pain, patient will achieve PT goals and d/c home with supportive family. Recommend provision of RW for d/c and HHPT (will continue to monitor for progression from need for HHPT). Patient will continue to benefit from continued skilled PT this admission to achieve maximal function and safety.   Goals   Patient Goals to have less pain   LTG Expiration Date 02/01/24   Long Term Goal #1 1) Perform bed mobility mod-I to participate in frequent repositioning and improve skin integrity; 2) Perform functional transfers mod-I to promote I with toileting and OOB mobility; 3) Ambulate 200 feet mod-I with least restrictive device to participate in household and community level mobility; 4) Improve L LE strength by 1/2 grade in order to improve efficiency of tranfers; 5) Improve balance by 1 grade to reduce risk for falls; 6) Improve overall activity tolerance to 60 minutes in order to increase patient's ability to engage in mobility tasks; 7) Navigate 12 steps S level in order to safely navigate multiple floors at home   PT Treatment Day 0   Plan   Treatment/Interventions Functional transfer training;LE strengthening/ROM;Therapeutic exercise;Endurance training;Patient/family training;Equipment eval/education;Bed mobility;Gait training;OT;Spoke to nursing;Elevations   PT Frequency 3-5x/wk   Discharge Recommendation   Rehab Resource Intensity Level, PT III (Minimum Resource Intensity)   Equipment Recommended (S)  Walker  (will require RW for d/c)   Walker Package Recommended Wheeled walker   Change/add to Walker Package? No   AM-PAC Basic Mobility Inpatient   Turning in Flat Bed Without Bedrails 3   Lying on Back to Sitting on Edge of Flat Bed Without Bedrails 3   Moving Bed to Chair 3   Standing Up From Chair Using Arms 3   Walk in Room 3   Climb 3-5 Stairs With Railing 2   Basic Mobility Inpatient Raw Score 17   Basic Mobility Standardized Score 39.67   Highest  Level Of Mobility   JH-HLM Goal 5: Stand one or more mins   JH-HLM Achieved 6: Walk 10 steps or more     The patient's AM-PAC Basic Mobility Inpatient Standardized Score is less than 42.9, suggesting this patient may benefit from discharge to post-acute rehabilitation services. Please also refer to the recommendation of the Physical Therapist for safe discharge planning.        Silvana Blas, PT, DPT

## 2024-01-18 NOTE — RESTORATIVE TECHNICIAN NOTE
Restorative Technician Note      Patient Name: Shilo Olmos     Note Type: Mobility  Patient Position Upon Consult: Bedside chair  Activity Performed: Transferred; Stood  Assistive Device: Roller walker  Patient Position at End of Consult: Supine; All needs within reach

## 2024-01-18 NOTE — OCCUPATIONAL THERAPY NOTE
Pt is a 58 y.o. male admitted 1/16/24 . Pt underwent ***. PMH includes ***. Pt lives in a ***. Pta, pt was independent w/ ADL/IADL and functional mobility, was *** driving and was not using any DME at baseline. Currently, pt is {rweassist:77077} for UB ADL, {rweassist:72248} for LB ADL, and completed transfers/FM w {rweassist:25615}. Pt is limited at this time 2* decreased endurance/activtiy tolerance, decreased cognition, decreased ADL/High-level ADL status, decreased self-care trans, decreased safety awareness, limited home support and is a fall risk. This impacts pt's ability to complete UB and LB dressing and bathing, toileting, transfers, functional mobility, community mobility, home and health maintenance, and safe engagement in typical daily routine. The patient's raw score on the -PAC Daily Activity inpatient short form is 21, standardized score is 44.27, {greater than/less than:23203} 39.4. Patients at this level are likely to benefit from discharge to {home/post-acute rehab services:67674}. Please refer to the recommendation of the Occupational Therapist for safe discharge planning.  From OT standpoint, pt should D/C to STR when medically stable. Pt will benefit from continued acute OT services 2-3 x/wk for 10-14 days to meet goals.

## 2024-01-18 NOTE — PLAN OF CARE
Problem: OCCUPATIONAL THERAPY ADULT  Goal: Performs self-care activities at highest level of function for planned discharge setting.  See evaluation for individualized goals.  Description:            See flowsheet documentation for full assessment, interventions and recommendations.   Note: Limitation: Decreased ADL status, Decreased endurance, Decreased self-care trans, Decreased high-level ADLs  Prognosis: Good  Assessment: Pt is a 58 y.o. male admitted 1/16/24 for elective LLE revascularization and exclusion of popliteal aneurysm. Pt underwent said procedure on date of admission, then was brought back to the OR for evacuation of hematoma and control of active persistent oozing from toe of proximal anastomosis. Pt is POD 2 and 1, respectively. Pt w active OT eval and treat orders. PMH includes  has a past medical history of Hypertension and Psoriasis. Pt lives w spouse in a 2 SH with 3 ирина, reports bed/bath upstairs which includes walk in shower with shower chair and standard toilet. Pta, pt was independent w/ ADL/IADL and functional mobility, was driving and was not using any DME at baseline. Currently, pt is Supervision for UB ADL, Mod Ax1 for LB ADL, and completed transfers/FM w Min Ax1. Pt is limited at this time 2* decreased endurance/activtiy tolerance, decreased cognition, decreased ADL/High-level ADL status, decreased self-care trans, decreased safety awareness, limited home support and is a fall risk. This impacts pt's ability to complete UB and LB dressing and bathing, toileting, transfers, functional mobility, community mobility, home and health maintenance, and safe engagement in typical daily routine. The patient's raw score on the AM-PAC Daily Activity inpatient short form is 21, standardized score is 44.27, greater than 39.4. Patients at this level are likely to benefit from discharge to home. Please refer to the recommendation of the Occupational Therapist for safe discharge planning.  From OT  standpoint, pt should D/C to home  when medically stable. Pt will benefit from continued acute OT services 2-3 x/wk for 10-14 days to meet goals.     Rehab Resource Intensity Level, OT: No post-acute rehabilitation needs

## 2024-01-18 NOTE — PLAN OF CARE
Problem: PHYSICAL THERAPY ADULT  Goal: Performs mobility at highest level of function for planned discharge setting.  See evaluation for individualized goals.  Description: Treatment/Interventions: Functional transfer training, LE strengthening/ROM, Therapeutic exercise, Endurance training, Patient/family training, Equipment eval/education, Bed mobility, Gait training, OT, Spoke to nursing, Elevations  Equipment Recommended: (S) Walker (will require RW for d/c)       See flowsheet documentation for full assessment, interventions and recommendations.  Note: Prognosis: Good  Problem List: Decreased strength, Decreased endurance, Impaired balance, Decreased mobility, Pain, Decreased skin integrity  Assessment: PT completed evaluation of 58 y.o. male admitted to [facility] on 1/16/2024 for the following planned procedure:  left SFA to BK pop r GSV bypass with pop aneurysm exclusion. On 1/17 patient returned to OR for evacuation of hematoma and does have wound vac. Per patient, he is planned to return to OR on 1/19 additional vascular procedure. Patient's current status instabilities include ongoing pain, wound vac, step down patient, continuous O2/HR monitoring, use of new AD, and a regression in function from baseline.  PMH is significant for HTN, AAA, and popliteal artery aneurysm. Prior to this admission patient resided with spouse in a 2 level home (3 PORSCHE, 1/2 bath on first floor + couch). At his baseline he is I with mobility (no use of AD), ADLs, and iADLS. +  and employment. Patient presents at time of PT evaluation functioning below baseline and currently w/ overall mobility deficits 2* to: impaired balance, decreased skin integrity, gait deviations, decreased activity tolerance and fall risk.  During PT evaluation, patient currently is requiring min-AX1 for bed mobility, transfers, and ambulation. With use of RW patient walked 5 feet x 2 (bed-->toilet-->chair) presenting with antalgic, step to  gait pattern. Anticipate with improvement in pain, patient will achieve PT goals and d/c home with supportive family. Recommend provision of RW for d/c and HHPT (will continue to monitor for progression from need for HHPT). Patient will continue to benefit from continued skilled PT this admission to achieve maximal function and safety.        Rehab Resource Intensity Level, PT: III (Minimum Resource Intensity)    See flowsheet documentation for full assessment.

## 2024-01-19 ENCOUNTER — ANESTHESIA (INPATIENT)
Dept: PERIOP | Facility: HOSPITAL | Age: 59
DRG: 253 | End: 2024-01-19
Payer: COMMERCIAL

## 2024-01-19 LAB
ABO GROUP BLD: NORMAL
ANION GAP SERPL CALCULATED.3IONS-SCNC: 7 MMOL/L
APTT PPP: 76 SECONDS (ref 23–37)
BLD GP AB SCN SERPL QL: NEGATIVE
BUN SERPL-MCNC: 21 MG/DL (ref 5–25)
CALCIUM SERPL-MCNC: 7.6 MG/DL (ref 8.4–10.2)
CHLORIDE SERPL-SCNC: 107 MMOL/L (ref 96–108)
CO2 SERPL-SCNC: 25 MMOL/L (ref 21–32)
CREAT SERPL-MCNC: 0.77 MG/DL (ref 0.6–1.3)
ERYTHROCYTE [DISTWIDTH] IN BLOOD BY AUTOMATED COUNT: 13.5 % (ref 11.6–15.1)
GFR SERPL CREATININE-BSD FRML MDRD: 100 ML/MIN/1.73SQ M
GLUCOSE SERPL-MCNC: 99 MG/DL (ref 65–140)
HCT VFR BLD AUTO: 25.4 % (ref 36.5–49.3)
HGB BLD-MCNC: 8.5 G/DL (ref 12–17)
MCH RBC QN AUTO: 30.6 PG (ref 26.8–34.3)
MCHC RBC AUTO-ENTMCNC: 33.5 G/DL (ref 31.4–37.4)
MCV RBC AUTO: 91 FL (ref 82–98)
PLATELET # BLD AUTO: 147 THOUSANDS/UL (ref 149–390)
PMV BLD AUTO: 10 FL (ref 8.9–12.7)
POTASSIUM SERPL-SCNC: 3.9 MMOL/L (ref 3.5–5.3)
RBC # BLD AUTO: 2.78 MILLION/UL (ref 3.88–5.62)
RH BLD: POSITIVE
SODIUM SERPL-SCNC: 139 MMOL/L (ref 135–147)
SPECIMEN EXPIRATION DATE: NORMAL
WBC # BLD AUTO: 8.74 THOUSAND/UL (ref 4.31–10.16)

## 2024-01-19 PROCEDURE — 86900 BLOOD TYPING SEROLOGIC ABO: CPT | Performed by: STUDENT IN AN ORGANIZED HEALTH CARE EDUCATION/TRAINING PROGRAM

## 2024-01-19 PROCEDURE — 85027 COMPLETE CBC AUTOMATED: CPT | Performed by: PHYSICIAN ASSISTANT

## 2024-01-19 PROCEDURE — 12020 TX SUPFC WND DEHSN SMPL CLSR: CPT | Performed by: SURGERY

## 2024-01-19 PROCEDURE — 85730 THROMBOPLASTIN TIME PARTIAL: CPT | Performed by: SURGERY

## 2024-01-19 PROCEDURE — 80048 BASIC METABOLIC PNL TOTAL CA: CPT | Performed by: PHYSICIAN ASSISTANT

## 2024-01-19 PROCEDURE — 99024 POSTOP FOLLOW-UP VISIT: CPT | Performed by: SURGERY

## 2024-01-19 PROCEDURE — 86901 BLOOD TYPING SEROLOGIC RH(D): CPT | Performed by: STUDENT IN AN ORGANIZED HEALTH CARE EDUCATION/TRAINING PROGRAM

## 2024-01-19 PROCEDURE — 86850 RBC ANTIBODY SCREEN: CPT | Performed by: STUDENT IN AN ORGANIZED HEALTH CARE EDUCATION/TRAINING PROGRAM

## 2024-01-19 RX ORDER — SODIUM CHLORIDE, SODIUM LACTATE, POTASSIUM CHLORIDE, CALCIUM CHLORIDE 600; 310; 30; 20 MG/100ML; MG/100ML; MG/100ML; MG/100ML
100 INJECTION, SOLUTION INTRAVENOUS CONTINUOUS
Status: DISCONTINUED | OUTPATIENT
Start: 2024-01-19 | End: 2024-01-20

## 2024-01-19 RX ORDER — MAGNESIUM HYDROXIDE 1200 MG/15ML
LIQUID ORAL AS NEEDED
Status: DISCONTINUED | OUTPATIENT
Start: 2024-01-19 | End: 2024-01-19 | Stop reason: HOSPADM

## 2024-01-19 RX ORDER — FENTANYL CITRATE/PF 50 MCG/ML
25 SYRINGE (ML) INJECTION
Status: DISCONTINUED | OUTPATIENT
Start: 2024-01-19 | End: 2024-01-19 | Stop reason: HOSPADM

## 2024-01-19 RX ORDER — ONDANSETRON 2 MG/ML
INJECTION INTRAMUSCULAR; INTRAVENOUS AS NEEDED
Status: DISCONTINUED | OUTPATIENT
Start: 2024-01-19 | End: 2024-01-19

## 2024-01-19 RX ORDER — MIDAZOLAM HYDROCHLORIDE 2 MG/2ML
INJECTION, SOLUTION INTRAMUSCULAR; INTRAVENOUS AS NEEDED
Status: DISCONTINUED | OUTPATIENT
Start: 2024-01-19 | End: 2024-01-19

## 2024-01-19 RX ORDER — CHLORPROMAZINE HYDROCHLORIDE 25 MG/1
25 TABLET, FILM COATED ORAL 2 TIMES DAILY
Status: COMPLETED | OUTPATIENT
Start: 2024-01-19 | End: 2024-01-19

## 2024-01-19 RX ORDER — PHENYLEPHRINE HCL IN 0.9% NACL 1 MG/10 ML
SYRINGE (ML) INTRAVENOUS AS NEEDED
Status: DISCONTINUED | OUTPATIENT
Start: 2024-01-19 | End: 2024-01-19

## 2024-01-19 RX ORDER — ONDANSETRON 2 MG/ML
4 INJECTION INTRAMUSCULAR; INTRAVENOUS ONCE AS NEEDED
Status: DISCONTINUED | OUTPATIENT
Start: 2024-01-19 | End: 2024-01-19 | Stop reason: HOSPADM

## 2024-01-19 RX ORDER — LIDOCAINE HYDROCHLORIDE 10 MG/ML
INJECTION, SOLUTION EPIDURAL; INFILTRATION; INTRACAUDAL; PERINEURAL AS NEEDED
Status: DISCONTINUED | OUTPATIENT
Start: 2024-01-19 | End: 2024-01-19

## 2024-01-19 RX ORDER — DEXAMETHASONE SODIUM PHOSPHATE 10 MG/ML
INJECTION, SOLUTION INTRAMUSCULAR; INTRAVENOUS AS NEEDED
Status: DISCONTINUED | OUTPATIENT
Start: 2024-01-19 | End: 2024-01-19

## 2024-01-19 RX ORDER — SODIUM CHLORIDE, SODIUM LACTATE, POTASSIUM CHLORIDE, CALCIUM CHLORIDE 600; 310; 30; 20 MG/100ML; MG/100ML; MG/100ML; MG/100ML
INJECTION, SOLUTION INTRAVENOUS CONTINUOUS PRN
Status: DISCONTINUED | OUTPATIENT
Start: 2024-01-19 | End: 2024-01-19

## 2024-01-19 RX ORDER — PROPOFOL 10 MG/ML
INJECTION, EMULSION INTRAVENOUS AS NEEDED
Status: DISCONTINUED | OUTPATIENT
Start: 2024-01-19 | End: 2024-01-19

## 2024-01-19 RX ORDER — EPHEDRINE SULFATE 50 MG/ML
INJECTION INTRAVENOUS AS NEEDED
Status: DISCONTINUED | OUTPATIENT
Start: 2024-01-19 | End: 2024-01-19

## 2024-01-19 RX ADMIN — HEPARIN SODIUM 18 UNITS/KG/HR: 10000 INJECTION, SOLUTION INTRAVENOUS at 00:00

## 2024-01-19 RX ADMIN — SODIUM CHLORIDE, SODIUM GLUCONATE, SODIUM ACETATE, POTASSIUM CHLORIDE, MAGNESIUM CHLORIDE, SODIUM PHOSPHATE, DIBASIC, AND POTASSIUM PHOSPHATE 125 ML/HR: .53; .5; .37; .037; .03; .012; .00082 INJECTION, SOLUTION INTRAVENOUS at 18:00

## 2024-01-19 RX ADMIN — Medication 200 MCG: at 14:45

## 2024-01-19 RX ADMIN — DEXAMETHASONE SODIUM PHOSPHATE 10 MG: 10 INJECTION, SOLUTION INTRAMUSCULAR; INTRAVENOUS at 14:43

## 2024-01-19 RX ADMIN — CHLORPROMAZINE HYDROCHLORIDE 25 MG: 25 TABLET, FILM COATED ORAL at 13:28

## 2024-01-19 RX ADMIN — ACETAMINOPHEN 975 MG: 325 TABLET, FILM COATED ORAL at 21:33

## 2024-01-19 RX ADMIN — SODIUM CHLORIDE, SODIUM LACTATE, POTASSIUM CHLORIDE, AND CALCIUM CHLORIDE: .6; .31; .03; .02 INJECTION, SOLUTION INTRAVENOUS at 14:32

## 2024-01-19 RX ADMIN — APIXABAN 5 MG: 5 TABLET, FILM COATED ORAL at 17:27

## 2024-01-19 RX ADMIN — OXYCODONE HYDROCHLORIDE 5 MG: 5 TABLET ORAL at 17:26

## 2024-01-19 RX ADMIN — ACETAMINOPHEN 975 MG: 325 TABLET, FILM COATED ORAL at 13:15

## 2024-01-19 RX ADMIN — OXYCODONE HYDROCHLORIDE 10 MG: 10 TABLET ORAL at 13:15

## 2024-01-19 RX ADMIN — EPHEDRINE SULFATE 5 MG: 50 INJECTION INTRAVENOUS at 14:48

## 2024-01-19 RX ADMIN — HYDROMORPHONE HYDROCHLORIDE 0.5 MG: 1 INJECTION, SOLUTION INTRAMUSCULAR; INTRAVENOUS; SUBCUTANEOUS at 18:58

## 2024-01-19 RX ADMIN — ACETAMINOPHEN 975 MG: 325 TABLET, FILM COATED ORAL at 05:24

## 2024-01-19 RX ADMIN — PHENYLEPHRINE HYDROCHLORIDE 20 MCG/MIN: 10 INJECTION INTRAVENOUS at 14:53

## 2024-01-19 RX ADMIN — Medication 200 MCG: at 14:49

## 2024-01-19 RX ADMIN — ASPIRIN 81 MG CHEWABLE TABLET 81 MG: 81 TABLET CHEWABLE at 08:04

## 2024-01-19 RX ADMIN — NOREPINEPHRINE BITARTRATE 16 MCG: 1 INJECTION, SOLUTION, CONCENTRATE INTRAVENOUS at 14:58

## 2024-01-19 RX ADMIN — Medication 200 MCG: at 14:57

## 2024-01-19 RX ADMIN — HEPARIN SODIUM 18 UNITS/KG/HR: 10000 INJECTION, SOLUTION INTRAVENOUS at 14:05

## 2024-01-19 RX ADMIN — LOSARTAN POTASSIUM 100 MG: 50 TABLET, FILM COATED ORAL at 08:04

## 2024-01-19 RX ADMIN — ONDANSETRON 4 MG: 2 INJECTION INTRAMUSCULAR; INTRAVENOUS at 15:07

## 2024-01-19 RX ADMIN — Medication 200 MCG: at 14:55

## 2024-01-19 RX ADMIN — OXYCODONE HYDROCHLORIDE 10 MG: 10 TABLET ORAL at 04:33

## 2024-01-19 RX ADMIN — MIDAZOLAM 2 MG: 1 INJECTION INTRAMUSCULAR; INTRAVENOUS at 14:32

## 2024-01-19 RX ADMIN — Medication 200 MCG: at 14:39

## 2024-01-19 RX ADMIN — SODIUM CHLORIDE, SODIUM GLUCONATE, SODIUM ACETATE, POTASSIUM CHLORIDE, MAGNESIUM CHLORIDE, SODIUM PHOSPHATE, DIBASIC, AND POTASSIUM PHOSPHATE 125 ML/HR: .53; .5; .37; .037; .03; .012; .00082 INJECTION, SOLUTION INTRAVENOUS at 08:00

## 2024-01-19 RX ADMIN — PRAVASTATIN SODIUM 80 MG: 80 TABLET ORAL at 17:26

## 2024-01-19 RX ADMIN — LIDOCAINE HYDROCHLORIDE 5 MG: 10 INJECTION, SOLUTION EPIDURAL; INFILTRATION; INTRACAUDAL; PERINEURAL at 14:39

## 2024-01-19 RX ADMIN — PROPOFOL 150 MG: 10 INJECTION, EMULSION INTRAVENOUS at 14:39

## 2024-01-19 RX ADMIN — SODIUM CHLORIDE, SODIUM LACTATE, POTASSIUM CHLORIDE, AND CALCIUM CHLORIDE 500 ML: .6; .31; .03; .02 INJECTION, SOLUTION INTRAVENOUS at 21:54

## 2024-01-19 RX ADMIN — HYDROMORPHONE HYDROCHLORIDE 0.5 MG: 1 INJECTION, SOLUTION INTRAMUSCULAR; INTRAVENOUS; SUBCUTANEOUS at 11:15

## 2024-01-19 RX ADMIN — NOREPINEPHRINE BITARTRATE 16 MCG: 1 INJECTION, SOLUTION, CONCENTRATE INTRAVENOUS at 15:03

## 2024-01-19 RX ADMIN — CEFAZOLIN SODIUM 2000 MG: 2 SOLUTION INTRAVENOUS at 14:43

## 2024-01-19 RX ADMIN — Medication 200 MCG: at 14:51

## 2024-01-19 RX ADMIN — EPHEDRINE SULFATE 10 MG: 50 INJECTION INTRAVENOUS at 14:46

## 2024-01-19 RX ADMIN — CHLORPROMAZINE HYDROCHLORIDE 25 MG: 25 TABLET, FILM COATED ORAL at 17:29

## 2024-01-19 RX ADMIN — OXYCODONE HYDROCHLORIDE 10 MG: 10 TABLET ORAL at 01:43

## 2024-01-19 RX ADMIN — Medication 200 MCG: at 14:47

## 2024-01-19 RX ADMIN — OXYCODONE HYDROCHLORIDE 10 MG: 10 TABLET ORAL at 09:05

## 2024-01-19 NOTE — PROGRESS NOTES
Progress Note - Vascular Surgery   Shilo Olmos 58 y.o. male [id number]  Unit/Bed#:[facility] 519-01 Encounter: [id number]      Assessment:    58 y.o. with PMH HTN, Psoriasis, B/L Knee surgery presenting with Left Popliteal aneurysm (2.45cm) for an elective left leg revascularization and exclusion of popliteal aneurysm.      1/16: Left SFA to BK Pop rGSV bypass c Pop aneurysm exclusion  1/17     Plan:  - Left leg warm, well perfused, viable   - edema, swelling significantly reduced   - symptoms resolved   - Cont NV chks   - Left leg palpable DP/PT   - motor and sensory intact  - ABLA   - Hgb 8.5 from 12   - Related to volume resuscitation and surgical bleeding    - Type and screen   - No transfusion necessary   - Diet: as tolerated  - Cont Hep gtt  - Abx: None  - VTEppx: Hep gtt  - Will plan to discharge on Eliquis  - Cont Aspirin, Statin    Subjective:    Pt s/e. No acute events overnight. Pt awake, alert.    Pt complains of pain in calf muslce, incisional pain. Tolerating diet. Not OOB. Voiding    States numbness, foot symptoms resolved     No new complaints. Denies n/v, sob, chest pain, f/c.     I/Os:  I/O last 3 completed shifts:  In: 4186.8 [P.O.:2168; I.V.:1518.8; IV Piggyback:500]  Out: 6225 [Urine:5650; Drains:225; Blood:350]  I/O this shift:  In: 1246.4 [P.O.:520; I.V.:726.4]  Out: 1870 [Urine:1845; Drains:25]    Review of Systems   All other systems reviewed and are negative.      Vitals:    01/18/24 2356   BP: 124/84   Pulse: 88   Resp: 18   Temp: 98.3 °F (36.8 °C)   SpO2: 95%        Physical Exam  Vitals and nursing note reviewed.   Constitutional:       General: He is not in acute distress.     Appearance: He is well-developed. He is not ill-appearing, toxic-appearing or diaphoretic.   HENT:      Head: Normocephalic and atraumatic.   Eyes:      Conjunctiva/sclera: Conjunctivae normal.   Cardiovascular:      Rate and Rhythm: Normal rate and regular rhythm.      Heart sounds: No murmur heard.     Comments:  "B/L Palp fem  LLE Palp DP/PT  Pulmonary:      Effort: Pulmonary effort is normal. No respiratory distress.      Breath sounds: Normal breath sounds.   Abdominal:      Palpations: Abdomen is soft.      Tenderness: There is no abdominal tenderness. There is no guarding.   Musculoskeletal:         General: No swelling.      Cervical back: Neck supple.      Right lower leg: No edema.      Left lower leg: Edema present.      Comments: Surgical incisions c/d/I, no bleeding, no hematoma    Left calf vac in place, functioning     Left Leg anterior/lateral compartment edematous  Left leg posterior compartments edematous, tender with some ecchymosis    Skin:     General: Skin is warm and dry.      Capillary Refill: Capillary refill takes less than 2 seconds.   Neurological:      General: No focal deficit present.      Mental Status: He is alert and oriented to person, place, and time. Mental status is at baseline.   Psychiatric:         Mood and Affect: Mood normal.         Imaging:I have personally reviewed pertinent reports.        Lab Results and Cultures:   Lab Results   Component Value Date    WBC 8.74 01/19/2024    HGB 8.5 (L) 01/19/2024    HCT 25.4 (L) 01/19/2024    MCV 91 01/19/2024     (L) 01/19/2024     Lab Results   Component Value Date    CALCIUM 7.6 (L) 01/19/2024    K 3.9 01/19/2024    CO2 25 01/19/2024     01/19/2024    BUN 21 01/19/2024    CREATININE 0.77 01/19/2024     Lab Results   Component Value Date    INR 1.10 01/18/2024    PROTIME 14.1 01/18/2024        Blood Culture: No results found for: \"BLOODCX\",   Urinalysis: No results found for: \"COLORU\", \"CLARITYU\", \"SPECGRAV\", \"PHUR\", \"LEUKOCYTESUR\", \"NITRITE\", \"PROTEINUA\", \"GLUCOSEU\", \"KETONESU\", \"BILIRUBINUR\", \"BLOODU\",   Urine Culture: No results found for: \"URINECX\",   Wound Culure: No results found for: \"WOUNDCULT\"    Medications:  Current Facility-Administered Medications   Medication Dose Route Frequency    acetaminophen (TYLENOL) tablet 975 " mg  975 mg Oral Q8H [facility]    aspirin chewable tablet 81 mg  81 mg Oral Daily    ceFAZolin (ANCEF) IVPB (premix in dextrose) 2,000 mg 50 mL  2,000 mg Intravenous Once    heparin (porcine) 25,000 units in 0.45% NaCl 250 mL infusion (premix)  3-30 Units/kg/hr (Order-Specific) Intravenous Titrated    heparin (porcine) injection 4,000 Units  4,000 Units Intravenous Q6H PRN    heparin (porcine) injection 8,000 Units  8,000 Units Intravenous Once    heparin (porcine) injection 8,000 Units  8,000 Units Intravenous Q6H PRN    hydrALAZINE (APRESOLINE) injection 5 mg  5 mg Intravenous Q6H PRN    HYDROmorphone (DILAUDID) injection 0.5 mg  0.5 mg Intravenous Q4H PRN    losartan (COZAAR) tablet 100 mg  100 mg Oral Daily    multi-electrolyte (PLASMALYTE-A/ISOLYTE-S PH 7.4) IV solution  125 mL/hr Intravenous Continuous    nicotine (NICODERM CQ) 21 mg/24 hr TD 24 hr patch 1 patch  1 patch Transdermal Q24H    ondansetron (ZOFRAN) injection 4 mg  4 mg Intravenous Q6H PRN    oxyCODONE (ROXICODONE) immediate release tablet 10 mg  10 mg Oral Q4H PRN    oxyCODONE (ROXICODONE) IR tablet 5 mg  5 mg Oral Q4H PRN    pravastatin (PRAVACHOL) tablet 80 mg  80 mg Oral Daily With Dinner       Patient Active Problem List   Diagnosis    Essential hypertension    Insect bite of lower leg with local reaction, right, initial encounter    Leg swelling    Gout of left foot    Primary osteoarthritis of left knee    Acute deep vein thrombosis (DVT) of tibial vein of right lower extremity (HCC)    Popliteal artery aneurysm, bilateral (HCC)    Infrarenal abdominal aortic aneurysm (AAA) without rupture (HCC)       Past Surgical History:   Procedure Laterality Date    EVACUATION OF HEMATOMA Left 1/17/2024    Procedure: EVACUATION/ DRAINAGE HEMATOMA, EXPLORATION ON LEFT LEG BYPASS, CONTROLL OF BLEEDING;  Surgeon: Mariana Vargas DO;  Location: [facility];  Service: Vascular    KNEE SURGERY Bilateral     SD BYPASS W/VEIN FEMORAL-POPLITEAL Left 1/16/2024     Procedure: BYPASS FEMORAL-POPLITEAL -Left above knee popliteal to below knee popliteal artery bypass WITH REVERSE SAPHENOUS VEIN GRAFT; EXCLUSION OF POPLITEAL ANEURYSM;  Surgeon: Parker Valdez MD;  Location: [facility];  Service: Vascular       Family History   Problem Relation Age of Onset    Hypertension Mother     No Known Problems Father        Social History     Socioeconomic History    Marital status: /Civil Union     Spouse name: Not on file    Number of children: Not on file    Years of education: Not on file    Highest education level: Not on file   Occupational History    Not on file   Tobacco Use    Smoking status: Every Day     Current packs/day: 0.25     Average packs/day: 0.3 packs/day for 32.0 years (8.0 ttl pk-yrs)     Types: Cigarettes    Smokeless tobacco: Never    Tobacco comments:     1 PPD x 15 years - As per [employer]    Vaping Use    Vaping status: Never Used   Substance and Sexual Activity    Alcohol use: Yes     Comment: rare social use one time per month    Drug use: Never    Sexual activity: Yes     Partners: Female   Other Topics Concern    Not on file   Social History Narrative    Current some day smoker - As per [employer]         · Most recent tobacco use screening:   10-      · Do you currently or have you served in the [employer]:   No     As per [employer]      Social Determinants of Health     Financial Resource Strain: Not on file   Food Insecurity: No Food Insecurity (1/17/2024)    Hunger Vital Sign     Worried About Running Out of Food in the Last Year: Never true     Ran Out of Food in the Last Year: Never true   Transportation Needs: No Transportation Needs (1/17/2024)    PRAPARE - Transportation     Lack of Transportation (Medical): No     Lack of Transportation (Non-Medical): No   Physical Activity: Not on file   Stress: Not on file   Social Connections: Not on file   Intimate Partner Violence: Not on file   Housing Stability: Low Risk  (1/17/2024)     Housing Stability Vital Sign     Unable to Pay for Housing in the Last Year: No     Number of Places Lived in the Last Year: 1     Unstable Housing in the Last Year: No       No Known Allergies

## 2024-01-19 NOTE — ANESTHESIA PREPROCEDURE EVALUATION
Procedure:  CLOSURE WOUND EXTREMITY, washout, possible vac placement (Left: Leg)    Relevant Problems   CARDIO   (+) Acute deep vein thrombosis (DVT) of tibial vein of right lower extremity (HCC)   (+) Essential hypertension   (+) Infrarenal abdominal aortic aneurysm (AAA) without rupture (HCC)   (+) Popliteal artery aneurysm, bilateral (HCC)      MUSCULOSKELETAL   (+) Gout of left foot   (+) Primary osteoarthritis of left knee      Other   (+) Leg swelling        Surgical history:  Pt is a 57 yo M w/ 4.6cm AAA, B popliteal aneurysm s/p L AK-BK popliteal bypass w/ anatomically tunneled rGSV 1/16/24 c/b surgical site hematoma s/p washout and partial closure 1/17/24     LLE with moderate edema  Proximal incision C/D/I  Distal incision w/ VAC in place  2+ L DP/PT pulse     -s/p L AK-BK pop bypass w/ rGSV 1/16/24  -c/b hematoma, s/p washout and plegit to prox anastomosis  -now with palpable pulses and resolved motorsensory deficits  -continue leg elevation for edema  -plan for OR tomorrow for closure of remainder of below knee incision  -will need outpatient f/u fordiscussion of RLE bypass for claudication; R popliteal aneurysms are thrombosed  -ASA, statin; Eliquis per Dr. Valdez       MARIO 9/15/23:    Left Ventricle: Left ventricular cavity size is normal. Wall thickness is mildly increased. There is mild to moderate concentric hypertrophy. The left ventricular ejection fraction is 60%. Systolic function is normal. Wall motion is normal. Diastolic function is normal.    Aorta: The aortic root is mildly dilated. The ascending aorta is mildly dilated. The aortic root is 4.00 cm. The ascending aorta is 4.2 cm.    BMP: Calcium = 8.1 otherwise wnl    CBC: WBC are high otherwise wnl    Anesthesia Plan  ASA Score- 3     Anesthesia Type- general with ASA Monitors.         Additional Monitors: arterial line.    Airway Plan: ETT.           Plan Factors-    Chart reviewed.   Existing labs reviewed. Patient summary  reviewed.    Patient is a current smoker.              Induction- intravenous.    Postoperative Plan- Plan for postoperative opioid use. Planned trial extubation    Informed Consent-

## 2024-01-19 NOTE — RESTORATIVE TECHNICIAN NOTE
Restorative Technician Note      Patient Name: Shilo Olmos     Note Type: Mobility  Patient Position Upon Consult: Supine  Activity Performed: Ambulated; Range of motion; Repositioned  Assistive Device: Roller walker  Patient Position at End of Consult: Supine; All needs within reach

## 2024-01-19 NOTE — OP NOTE
OPERATIVE REPORT  PATIENT NAME: Shilo Olmos    :  1965  MRN: [id number]  Pt Location: [facility] OR ROOM 08    SURGERY DATE: 2024    Surgeons and Role:     * Parker Valdez MD - Primary     * Gerber Bagley DO - Assisting     * Hien Stockton MD - Assisting    Preop Diagnosis:  Popliteal artery aneurysm, bilateral (HCC) [I72.4]    Post-Op Diagnosis Codes:     * Popliteal artery aneurysm, bilateral (HCC) [I72.4]    Procedure(s):  Left - CLOSURE LEFT LOWER EXTREMITY WOUND. WASHOUT    Specimen(s):  * No specimens in log *    Estimated Blood Loss:   Minimal    Drains:  * No LDAs found *    Anesthesia Type:   General    Operative Indications:  Popliteal artery aneurysm, bilateral (HCC) [I72.4]  Delayed primary closure    Operative Findings:  Tissue underlying left medial calf incision appeared healthy and viable  Incision closed with 2-0 nylon vertical mattress and simple interrupted     Complications:   None    Procedure and Technique:  After informed consent was obtained explaining the risks/benefits/alternatives of the procedure, the patient was taken to the OR. General anesthesia was induced and the patient was prepped and draped in the usual sterile fashion. A time out was performed to verify correct site and procedure.    Left medial leg incision was thoroughly irrigated with sterile saline. Underlying muscle appeared healthy and viable. Skin was closed with 2-0 nylon suture using vertical mattress and simple interrupted. Easily approximated without tension. Sterile dressing applied.     Palpable LLE DP/PT pulses at the end of the case.     The patient was awakened and taken to the PACU without any immediate post op complications.      Dr Valdez was present for the entire procedure.    Patient Disposition:  PACU     SIGNATURE: Hien Stockton MD  DATE: 2024  TIME: 3:19 PM

## 2024-01-19 NOTE — ANESTHESIA POSTPROCEDURE EVALUATION
Post-Op Assessment Note    CV Status:  Stable  Pain Score: 0    Pain management: adequate       Mental Status:  Awake   Hydration Status:  Euvolemic   PONV Controlled:  Controlled   Airway Patency:  Patent  Two or more mitigation strategies used for obstructive sleep apnea   Post Op Vitals Reviewed: Yes      Staff: CRNA               /69   Temp 98.4 °F (36.9 °C) (01/19/24 1515)    Pulse (!) 107 (01/19/24 1515)   Resp 14 (01/19/24 1515)    SpO2   93%

## 2024-01-20 LAB
ANION GAP SERPL CALCULATED.3IONS-SCNC: 6 MMOL/L
BUN SERPL-MCNC: 17 MG/DL (ref 5–25)
CALCIUM SERPL-MCNC: 8.1 MG/DL (ref 8.4–10.2)
CHLORIDE SERPL-SCNC: 106 MMOL/L (ref 96–108)
CO2 SERPL-SCNC: 27 MMOL/L (ref 21–32)
CREAT SERPL-MCNC: 0.72 MG/DL (ref 0.6–1.3)
ERYTHROCYTE [DISTWIDTH] IN BLOOD BY AUTOMATED COUNT: 13.3 % (ref 11.6–15.1)
GFR SERPL CREATININE-BSD FRML MDRD: 102 ML/MIN/1.73SQ M
GLUCOSE SERPL-MCNC: 126 MG/DL (ref 65–140)
HCT VFR BLD AUTO: 22.3 % (ref 36.5–49.3)
HGB BLD-MCNC: 7.4 G/DL (ref 12–17)
MCH RBC QN AUTO: 30.7 PG (ref 26.8–34.3)
MCHC RBC AUTO-ENTMCNC: 33.2 G/DL (ref 31.4–37.4)
MCV RBC AUTO: 93 FL (ref 82–98)
PLATELET # BLD AUTO: 146 THOUSANDS/UL (ref 149–390)
PMV BLD AUTO: 10.1 FL (ref 8.9–12.7)
POTASSIUM SERPL-SCNC: 4.5 MMOL/L (ref 3.5–5.3)
RBC # BLD AUTO: 2.41 MILLION/UL (ref 3.88–5.62)
SODIUM SERPL-SCNC: 139 MMOL/L (ref 135–147)
WBC # BLD AUTO: 6.63 THOUSAND/UL (ref 4.31–10.16)

## 2024-01-20 PROCEDURE — 80048 BASIC METABOLIC PNL TOTAL CA: CPT | Performed by: PHYSICIAN ASSISTANT

## 2024-01-20 PROCEDURE — 99024 POSTOP FOLLOW-UP VISIT: CPT | Performed by: SURGERY

## 2024-01-20 PROCEDURE — 85027 COMPLETE CBC AUTOMATED: CPT | Performed by: PHYSICIAN ASSISTANT

## 2024-01-20 RX ORDER — AMOXICILLIN 250 MG
1 CAPSULE ORAL
Status: DISCONTINUED | OUTPATIENT
Start: 2024-01-20 | End: 2024-01-21 | Stop reason: HOSPADM

## 2024-01-20 RX ORDER — CALCIUM CARBONATE 500 MG/1
500 TABLET, CHEWABLE ORAL DAILY PRN
Status: DISCONTINUED | OUTPATIENT
Start: 2024-01-20 | End: 2024-01-21 | Stop reason: HOSPADM

## 2024-01-20 RX ORDER — POLYETHYLENE GLYCOL 3350 17 G/17G
17 POWDER, FOR SOLUTION ORAL DAILY
Status: DISCONTINUED | OUTPATIENT
Start: 2024-01-20 | End: 2024-01-21 | Stop reason: HOSPADM

## 2024-01-20 RX ADMIN — OXYCODONE HYDROCHLORIDE 5 MG: 5 TABLET ORAL at 09:55

## 2024-01-20 RX ADMIN — ASPIRIN 81 MG CHEWABLE TABLET 81 MG: 81 TABLET CHEWABLE at 09:44

## 2024-01-20 RX ADMIN — ACETAMINOPHEN 975 MG: 325 TABLET, FILM COATED ORAL at 04:37

## 2024-01-20 RX ADMIN — ACETAMINOPHEN 975 MG: 325 TABLET, FILM COATED ORAL at 14:18

## 2024-01-20 RX ADMIN — SODIUM CHLORIDE, SODIUM GLUCONATE, SODIUM ACETATE, POTASSIUM CHLORIDE, MAGNESIUM CHLORIDE, SODIUM PHOSPHATE, DIBASIC, AND POTASSIUM PHOSPHATE 125 ML/HR: .53; .5; .37; .037; .03; .012; .00082 INJECTION, SOLUTION INTRAVENOUS at 04:02

## 2024-01-20 RX ADMIN — SENNOSIDES AND DOCUSATE SODIUM 1 TABLET: 50; 8.6 TABLET ORAL at 21:50

## 2024-01-20 RX ADMIN — HYDROMORPHONE HYDROCHLORIDE 0.5 MG: 1 INJECTION, SOLUTION INTRAMUSCULAR; INTRAVENOUS; SUBCUTANEOUS at 11:56

## 2024-01-20 RX ADMIN — PRAVASTATIN SODIUM 80 MG: 80 TABLET ORAL at 17:02

## 2024-01-20 RX ADMIN — SENNOSIDES AND DOCUSATE SODIUM 1 TABLET: 50; 8.6 TABLET ORAL at 14:19

## 2024-01-20 RX ADMIN — LOSARTAN POTASSIUM 100 MG: 50 TABLET, FILM COATED ORAL at 09:43

## 2024-01-20 RX ADMIN — APIXABAN 5 MG: 5 TABLET, FILM COATED ORAL at 17:02

## 2024-01-20 RX ADMIN — CALCIUM CARBONATE (ANTACID) CHEW TAB 500 MG 500 MG: 500 CHEW TAB at 11:45

## 2024-01-20 RX ADMIN — ACETAMINOPHEN 975 MG: 325 TABLET, FILM COATED ORAL at 21:50

## 2024-01-20 RX ADMIN — APIXABAN 5 MG: 5 TABLET, FILM COATED ORAL at 09:43

## 2024-01-20 RX ADMIN — POLYETHYLENE GLYCOL 3350 17 G: 17 POWDER, FOR SOLUTION ORAL at 14:18

## 2024-01-20 RX ADMIN — OXYCODONE HYDROCHLORIDE 5 MG: 5 TABLET ORAL at 17:14

## 2024-01-20 NOTE — PROGRESS NOTES
Progress Note - Vascular Surgery   Shilo Olmos 58 y.o. male [id number]  Unit/Bed#:[facility] 519-01 Encounter: [id number]      Assessment:    58 y.o. M  w/ 4.6cm AAA, B popliteal aneurysm s/p L AK-BK popliteal bypass w/ anatomically tunneled rGSV 1/16/24 c/b surgical site hematoma s/p washout and partial closure 1/17/24. Now s/p washout and closure on 1/19/24.    Hypotensive overnight received 500 cc bolus, otherwise vitals stable    Hgb 7.4 (8.5)  WBC 6.6 (8.7)  Cr 0.72    Palpable L DP/PT    Plan:  - Cont NV chks   - Left leg palpable DP/PT   - motor and sensory intact  - ABLA   -monitor for s/s of bleeding   -No current indication for transfusion  - Diet: as tolerated  - Eliquis restarted  - Will plan to discharge on Eliquis  - Cont Aspirin, Statin  - OOB and ambulate  - Dispo planning    Subjective:  Hypotensive overnight improved with IVF. No nausea or vomiting. Having some left leg pain. No numbness or tingling. Denies any nausea or vomiting. No fevers/chils. No chest pain or sob.    I/Os:  I/O last 3 completed shifts:  In: 6675.6 [P.O.:1840; I.V.:4285.6; IV Piggyback:550]  Out: 4520 [Urine:4245; Drains:275]  I/O this shift:  In: -   Out: 800 [Urine:800]    Review of Systems   All other systems reviewed and are negative.      Vitals:    01/20/24 0740   BP: 132/86   Pulse:    Resp: 16   Temp: 97.6 °F (36.4 °C)   SpO2:         Physical exam  General: NAD  HENT: NCAT MMM  Neck: supple, no JVD  CV: nl rate  Lungs: nl wob. No resp distress  ABD: Soft, nontender, nondistended  Extrem: LLE w/ palpable fem and dp/pt. Motor/sensory intact. Swelling noted over left leg, improved. Incision c//I. Some scabbing and ecchymosis noted over GSV harvest site. SS drainage from remainder of incisions  Neuro: AAOx3      Imaging:I have personally reviewed pertinent reports.        Lab Results and Cultures:   Lab Results   Component Value Date    WBC 6.63 01/20/2024    HGB 7.4 (L) 01/20/2024    HCT 22.3 (L) 01/20/2024    MCV 93  "01/20/2024     (L) 01/20/2024     Lab Results   Component Value Date    CALCIUM 8.1 (L) 01/20/2024    K 4.5 01/20/2024    CO2 27 01/20/2024     01/20/2024    BUN 17 01/20/2024    CREATININE 0.72 01/20/2024     Lab Results   Component Value Date    INR 1.10 01/18/2024    PROTIME 14.1 01/18/2024        Blood Culture: No results found for: \"BLOODCX\",   Urinalysis: No results found for: \"COLORU\", \"CLARITYU\", \"SPECGRAV\", \"PHUR\", \"LEUKOCYTESUR\", \"NITRITE\", \"PROTEINUA\", \"GLUCOSEU\", \"KETONESU\", \"BILIRUBINUR\", \"BLOODU\",   Urine Culture: No results found for: \"URINECX\",   Wound Culure: No results found for: \"WOUNDCULT\"    Medications:  Current Facility-Administered Medications   Medication Dose Route Frequency    acetaminophen (TYLENOL) tablet 975 mg  975 mg Oral Q8H [facility]    apixaban (ELIQUIS) tablet 5 mg  5 mg Oral BID    aspirin chewable tablet 81 mg  81 mg Oral Daily    hydrALAZINE (APRESOLINE) injection 5 mg  5 mg Intravenous Q6H PRN    HYDROmorphone (DILAUDID) injection 0.5 mg  0.5 mg Intravenous Q4H PRN    losartan (COZAAR) tablet 100 mg  100 mg Oral Daily    multi-electrolyte (PLASMALYTE-A/ISOLYTE-S PH 7.4) IV solution  125 mL/hr Intravenous Continuous    nicotine (NICODERM CQ) 21 mg/24 hr TD 24 hr patch 1 patch  1 patch Transdermal Q24H    ondansetron (ZOFRAN) injection 4 mg  4 mg Intravenous Q6H PRN    oxyCODONE (ROXICODONE) immediate release tablet 10 mg  10 mg Oral Q4H PRN    oxyCODONE (ROXICODONE) IR tablet 5 mg  5 mg Oral Q4H PRN    pravastatin (PRAVACHOL) tablet 80 mg  80 mg Oral Daily With Dinner       Patient Active Problem List   Diagnosis    Essential hypertension    Insect bite of lower leg with local reaction, right, initial encounter    Leg swelling    Gout of left foot    Primary osteoarthritis of left knee    Acute deep vein thrombosis (DVT) of tibial vein of right lower extremity (HCC)    Popliteal artery aneurysm, bilateral (HCC)    Infrarenal abdominal aortic aneurysm (AAA) without " rupture (HCC)       Past Surgical History:   Procedure Laterality Date    EVACUATION OF HEMATOMA Left 1/17/2024    Procedure: EVACUATION/ DRAINAGE HEMATOMA, EXPLORATION ON LEFT LEG BYPASS, CONTROLL OF BLEEDING;  Surgeon: Mariana Vargas DO;  [facility];  Service: Vascular    KNEE SURGERY Bilateral     VA BYPASS W/VEIN FEMORAL-POPLITEAL Left 1/16/2024    Procedure: BYPASS FEMORAL-POPLITEAL -Left above knee popliteal to below knee popliteal artery bypass WITH REVERSE SAPHENOUS VEIN GRAFT; EXCLUSION OF POPLITEAL ANEURYSM;  Surgeon: Parker Valdez MD;  [facility];  Service: Vascular       Family History   Problem Relation Age of Onset    Hypertension Mother     No Known Problems Father        Social History     Socioeconomic History    Marital status: /Civil Union     Spouse name: Not on file    Number of children: Not on file    Years of education: Not on file    Highest education level: Not on file   Occupational History    Not on file   Tobacco Use    Smoking status: Every Day     Current packs/day: 0.25     Average packs/day: 0.3 packs/day for 32.0 years (8.0 ttl pk-yrs)     Types: Cigarettes    Smokeless tobacco: Never    Tobacco comments:     1 PPD x 15 years - As per [employer]    Vaping Use    Vaping status: Never Used   Substance and Sexual Activity    Alcohol use: Yes     Comment: rare social use one time per month    Drug use: Never    Sexual activity: Yes     Partners: Female   Other Topics Concern    Not on file   Social History Narrative    Current some day smoker - As per [employer]         · Most recent tobacco use screening:   10-      · Do you currently or have you served in the [employer]:   No     As per [employer]      Social Determinants of Health     Financial Resource Strain: Not on file   Food Insecurity: No Food Insecurity (1/17/2024)    Hunger Vital Sign     Worried About Running Out of Food in the Last Year: Never true     Ran Out of Food in the  Last Year: Never true   Transportation Needs: No Transportation Needs (1/17/2024)    PRAPARE - Transportation     Lack of Transportation (Medical): No     Lack of Transportation (Non-Medical): No   Physical Activity: Not on file   Stress: Not on file   Social Connections: Not on file   Intimate Partner Violence: Not on file   Housing Stability: Low Risk  (1/17/2024)    Housing Stability Vital Sign     Unable to Pay for Housing in the Last Year: No     Number of Places Lived in the Last Year: 1     Unstable Housing in the Last Year: No       No Known Allergies

## 2024-01-20 NOTE — QUICK NOTE
"Post Op Check Note - Vascular Surgery   Shilo Olmos 58 y.o. male MRN: [id number]  Unit/Bed#: [facility] 519-01 Encounter: [id number]    ASSESSMENT:  Shilo Olmos is a 58 y.o. male who is status post LLE wound closure.     - Hypotensive tonight however asymptomatic, will follow hypotension protocol if no response will recheck labs and follow up accordingly.     Subjective: Patient seen and evaluated at bedside. Denies any significant pain however does reports incisional pain. Denies lightheadedness, dizziness, nausea, fevers, or chills.     Physical Exam:  GEN: NAD  CV: RRR  Lung: Normal effort  Ab: Soft, NT/ND  Neuro: A+Ox3  Incisions: Clean, dry and intact.   Pulses: Lt Palp DP/PT    Blood pressure (!) 89/55, pulse 95, temperature 99.2 °F (37.3 °C), resp. rate 15, height 5' 11\" (1.803 m), weight 103 kg (228 lb), SpO2 (!) 89%.,Body mass index is 31.8 kg/m².      Intake/Output Summary (Last 24 hours) at 1/19/2024 2227  Last data filed at 1/19/2024 1900  Gross per 24 hour   Intake 3927.05 ml   Output 2195 ml   Net 1732.05 ml       Invasive Devices       Peripheral Intravenous Line  Duration             Peripheral IV 01/16/24 Left Hand 3 days    Peripheral IV 01/16/24 Right Wrist 3 days                    VTE Pharmacologic Prophylaxis: Sequential compression device (Venodyne)             "

## 2024-01-21 VITALS
BODY MASS INDEX: 31.92 KG/M2 | SYSTOLIC BLOOD PRESSURE: 148 MMHG | RESPIRATION RATE: 18 BRPM | OXYGEN SATURATION: 97 % | HEIGHT: 71 IN | TEMPERATURE: 98 F | WEIGHT: 228 LBS | DIASTOLIC BLOOD PRESSURE: 103 MMHG | HEART RATE: 82 BPM
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
## 2024-01-21 PROBLEM — S80.861A: Status: RESOLVED | Noted: 2019-09-20 | Resolved: 2024-01-21

## 2024-01-21 PROBLEM — I71.43 INFRARENAL ABDOMINAL AORTIC ANEURYSM (AAA) WITHOUT RUPTURE (HCC): Status: RESOLVED | Noted: 2023-11-20 | Resolved: 2024-01-21

## 2024-01-21 LAB
ANION GAP SERPL CALCULATED.3IONS-SCNC: 7 MMOL/L
BUN SERPL-MCNC: 16 MG/DL (ref 5–25)
CALCIUM SERPL-MCNC: 8.2 MG/DL (ref 8.4–10.2)
CHLORIDE SERPL-SCNC: 109 MMOL/L (ref 96–108)
CO2 SERPL-SCNC: 27 MMOL/L (ref 21–32)
CREAT SERPL-MCNC: 0.8 MG/DL (ref 0.6–1.3)
ERYTHROCYTE [DISTWIDTH] IN BLOOD BY AUTOMATED COUNT: 13.8 % (ref 11.6–15.1)
GFR SERPL CREATININE-BSD FRML MDRD: 98 ML/MIN/1.73SQ M
GLUCOSE SERPL-MCNC: 97 MG/DL (ref 65–140)
HCT VFR BLD AUTO: 23.6 % (ref 36.5–49.3)
HGB BLD-MCNC: 7.5 G/DL (ref 12–17)
MCH RBC QN AUTO: 30.1 PG (ref 26.8–34.3)
MCHC RBC AUTO-ENTMCNC: 31.8 G/DL (ref 31.4–37.4)
MCV RBC AUTO: 95 FL (ref 82–98)
PLATELET # BLD AUTO: 199 THOUSANDS/UL (ref 149–390)
PMV BLD AUTO: 10 FL (ref 8.9–12.7)
POTASSIUM SERPL-SCNC: 4.2 MMOL/L (ref 3.5–5.3)
RBC # BLD AUTO: 2.49 MILLION/UL (ref 3.88–5.62)
SODIUM SERPL-SCNC: 143 MMOL/L (ref 135–147)
WBC # BLD AUTO: 6.32 THOUSAND/UL (ref 4.31–10.16)

## 2024-01-21 PROCEDURE — 80048 BASIC METABOLIC PNL TOTAL CA: CPT | Performed by: PHYSICIAN ASSISTANT

## 2024-01-21 PROCEDURE — 99024 POSTOP FOLLOW-UP VISIT: CPT | Performed by: SURGERY

## 2024-01-21 PROCEDURE — 85027 COMPLETE CBC AUTOMATED: CPT | Performed by: PHYSICIAN ASSISTANT

## 2024-01-21 PROCEDURE — NC001 PR NO CHARGE: Performed by: SURGERY

## 2024-01-21 RX ORDER — POLYETHYLENE GLYCOL 3350 17 G/17G
17 POWDER, FOR SOLUTION ORAL DAILY
Qty: 238 G | Refills: 0 | Status: SHIPPED | OUTPATIENT
Start: 2024-01-22 | End: 2024-02-01 | Stop reason: ALTCHOICE

## 2024-01-21 RX ORDER — CALCIUM CARBONATE 500 MG/1
500 TABLET, CHEWABLE ORAL DAILY PRN
Start: 2024-01-21

## 2024-01-21 RX ORDER — ACETAMINOPHEN 325 MG/1
975 TABLET ORAL EVERY 8 HOURS SCHEDULED
Start: 2024-01-21

## 2024-01-21 RX ORDER — OXYCODONE HYDROCHLORIDE 5 MG/1
5 TABLET ORAL EVERY 4 HOURS PRN
Qty: 15 TABLET | Refills: 0 | Status: SHIPPED | OUTPATIENT
Start: 2024-01-21 | End: 2024-01-31

## 2024-01-21 RX ORDER — AMOXICILLIN 250 MG
1 CAPSULE ORAL
Qty: 30 TABLET | Refills: 0 | Status: SHIPPED | OUTPATIENT
Start: 2024-01-21 | End: 2024-02-01 | Stop reason: ALTCHOICE

## 2024-01-21 RX ORDER — NICOTINE 21 MG/24HR
1 PATCH, TRANSDERMAL 24 HOURS TRANSDERMAL DAILY
Qty: 28 PATCH | Refills: 0 | Status: SHIPPED | OUTPATIENT
Start: 2024-01-21 | End: 2024-02-01 | Stop reason: ALTCHOICE

## 2024-01-21 RX ADMIN — ACETAMINOPHEN 975 MG: 325 TABLET, FILM COATED ORAL at 05:23

## 2024-01-21 RX ADMIN — LOSARTAN POTASSIUM 100 MG: 50 TABLET, FILM COATED ORAL at 08:02

## 2024-01-21 RX ADMIN — ASPIRIN 81 MG CHEWABLE TABLET 81 MG: 81 TABLET CHEWABLE at 08:02

## 2024-01-21 RX ADMIN — APIXABAN 5 MG: 5 TABLET, FILM COATED ORAL at 08:02

## 2024-01-21 RX ADMIN — POLYETHYLENE GLYCOL 3350 17 G: 17 POWDER, FOR SOLUTION ORAL at 08:02

## 2024-01-21 NOTE — DISCHARGE SUMMARY
Discharge Summary   Shilo Olmos 58 y.o. male MRN: [id number]  Unit/Bed#: [facility] 519-01 Encounter: [id number]    Admission Date: 1/16/2024     Discharge Date:01/21/24    Attending: Dr. Valdez    Consultants: None    Admitting Diagnosis: Popliteal artery aneurysm, bilateral (HCC) [I72.4]    Principle/ Secondary Diagnosis:  Past Medical History:   Diagnosis Date    Hypertension     Psoriasis      Past Surgical History:   Procedure Laterality Date    EVACUATION OF HEMATOMA Left 1/17/2024    Procedure: EVACUATION/ DRAINAGE HEMATOMA, EXPLORATION ON LEFT LEG BYPASS, CONTROLL OF BLEEDING;  Surgeon: Mariana Vargas DO;  Location: [facility];  Service: Vascular    KNEE SURGERY Bilateral     WY BYPASS W/VEIN FEMORAL-POPLITEAL Left 1/16/2024    Procedure: BYPASS FEMORAL-POPLITEAL -Left above knee popliteal to below knee popliteal artery bypass WITH REVERSE SAPHENOUS VEIN GRAFT; EXCLUSION OF POPLITEAL ANEURYSM;  Surgeon: Parker Valdez MD;  Location: [facility];  Service: Vascular       Procedures Performed:     1/16/24: WY BYPASS W/VEIN FEMORAL-POPLITEAL [20292] (BYPASS FEMORAL-POPLITEAL -Left above knee popliteal to below knee popliteal artery bypass)  CLOSURE LEFT LOWER EXTREMITY WOUND, WASHOUT (Left: Leg)  Procedure(s):  1/19/24: CLOSURE LEFT LOWER EXTREMITY WOUND, WASHOUT    Laboratory data at discharge:   Results from last 7 days   Lab Units 01/21/24  0526 01/20/24  0437 01/19/24  0425   WBC Thousand/uL 6.32 6.63 8.74   HEMOGLOBIN g/dL 7.5* 7.4* 8.5*   HEMATOCRIT % 23.6* 22.3* 25.4*   PLATELETS Thousands/uL 199 146* 147*     Results from last 7 days   Lab Units 01/21/24  0526 01/20/24  0437 01/19/24  0425   POTASSIUM mmol/L 4.2 4.5 3.9   CHLORIDE mmol/L 109* 106 107   CO2 mmol/L 27 27 25   BUN mg/dL 16 17 21   CREATININE mg/dL 0.80 0.72 0.77   CALCIUM mg/dL 8.2* 8.1* 7.6*     Results from last 7 days   Lab Units 01/19/24  0425 01/18/24  2205 01/18/24  1609 01/18/24  0935   INR   --   --   --  1.10    PTT seconds 76* 83* 83* 24           Discharge instructions/Information to patient and family:   See after visit summary for information provided to patient and family.     Discharge Medications:  See after visit summary for reconciled discharge medications provided to patient and family.      Hospital Course:   The patient is a 58 year old male who was seen in the outpatient setting for an abdominal aortic aneurysm.  Through that workup he was also found to have bilateral popliteal artery aneurysms.  The right side was thrombosed but the left side was seemingly symptomatic.  Therefore, the patient was planned to have an elective left AK pop to BP bypass with exclusion.  Surgery went well and on POD 1 the patient developed swelling and sensory deficits, concerning for compartment syndrome. The patient returned to the OR for exploration, evacuation of hematoma, possible thrombectomy and fasciotomies.  OR findings were consistent with oozing from the proximal anastomosis.  Sensory deficits resolved thereafter.  The patient returned to the OR on 1/19 for closure of incisions.  Since that time, patient has recovered well and is stable for DC on 1/21.   He has been restarted on his eliquis at this time.    Hospital course was complicated by the following:  Proximal anastamosis bleeding requiring return to OR.    Prior to discharge, the patient was given instructions for outpatient care and follow-up.  The patient has been instructed to call w/ any questions, changes, or concerns for the medical condition.    For further details of the hospitalization, please refer to the medical record.    Condition at Discharge: good     Provisions for Follow-Up Care:  See after visit summary for information related to follow-up care and any pertinent home health orders.      Disposition: See After Visit Summary for discharge disposition information.    Planned Readmission: No    Discharge Statement   I spent 35 minutes discharging  the patient. This time was spent on the day of discharge. I had direct contact with the patient on the day of discharge. Additional documentation is required if more than 30 minutes were spent on discharge.     Dayan Johnson PA-C  1/21/2024      This text is generated with voice recognition software. There may be translation, syntax,  or grammatical errors. If you have any questions, please contact the dictating provider.

## 2024-01-21 NOTE — DISCHARGE INSTR - OTHER ORDERS
DISCHARGE INSTRUCTIONS  LEG/BYPASS SURGERY    ACTIVITY:   Limit your physical activity to walking for the first week and then increase your activity as tolerated.  If you become short of breath or tired, stop and rest.  You may require help with walking or feel more secure with something to lean on.  Walking up steps and normal activities may be resumed as you feel ready.  Most people tire easily for the first few weeks following leg surgery.  This improves as conditioning returns.  Avoid strenuous activity such as vigorous exercise.  Avoid heavy lifting (do not lift more than 15 pounds) for the first four weeks after surgery.  You should not drive a car for at least two weeks following discharge from the hospital and you are off all narcotic pain medication.  You may ride in a car.    DIET:  Resume your normal diet.  Good nutrition is important for healing of your incision.  If you are discharged on narcotics for pain control, continue taking your stool softeners until you are having regular bowel movements.    INCISION:  You should shower daily.  Wash incision daily with soap and water, but do not rub or scrub the incision; rinse thoroughly and pat dry.  You may have stitches or staples to close your incision and it is okay for these to get wet.  Do not bathe in a tub or swim for the first 4 week following surgery or if you have any open wounds.  It is normal to have swelling or discoloration around the incision.   If increasing redness or pain develops, call our office immediately.  Numbness in the region of the incision may occur following the surgery.  This normally improves over six to twelve months.    You may have surgical glue over your incisions. There are stitches present under the skin which will absorb on their own.   The glue is used to cover the access, assist in closure, and prevent contamination. This adhesive will darken and peel away on its own within one to two weeks. Do not pick at it.    If you  have a groin wound/incision, place a clean dry piece of gauze to cover your groin incision to keep incision clean and dry and prevent your skin from sticking together.  Change gauze daily.  You may have staples or stitches at your incisions.  These will be removed at your follow-up appointment or when they are ready to come out.  If you have a dressing over your surgical site, remove this on the second day after surgery.  If you have foot or leg wounds, please follow your podiatrist/wound care doctor's instructions for care.  If any of your incisions are open and require dressing changes, you will be given instructions for your daily incision care. If you are not able to change the dressings, a visiting nurse will be arranged.    DO NOT put any powders, creams, ointments, or lotions on your incision.    LEG SWELLING: Most patients have noticeable leg swelling after leg surgery. This usually improves within a few weeks. If swelling is present, elevate the leg whenever possible. Avoid sitting with the leg hanging down for prolonged periods of time.  Walking is beneficial.  An ACE bandage or support stocking may be helpful, but this should be discussed with your physician prior to use if you have a bypass.    FOLLOW UP STUDIES:  Doppler ultrasound studies are very important for long-term management.  Your surgeon will arrange this at your first postoperative visit.  Repeat studies are then scheduled every three months for the first year and periodically after this.    FOLLOW UP APPOINTMENTS:  Making and keeping follow up appointments and ultrasound tests are important to your recovery.  If you have difficulty making it to or keeping your follow up appointments, call the office.    If you have increased pain, fever >101.5, increased drainage, redness or a bad smell at your surgery site, new coldness/numbness of your arm or leg, please call us immediately and GO directly to the ER.    PLEASE CALL THE OFFICE IF YOU HAVE  ANY QUESTIONS  [phone]  [phone] [address], [address], [place] [address]  [address] [facility], [address], [place] [address]  [address] [place] [address]  [address] [facility], [address], [place] [address]  [address] [facility], [address], [place] [address]  [address] [facility], 2nd [address], [address], [place] [address]  [address] [facility], [address], [place] [address]  [address] [facility], 1st Floor, [address], [place] [address]  [address], [place] [address]  [address] [address]

## 2024-01-21 NOTE — PROGRESS NOTES
"Progress Note - Vascular Surgery     Assessment/Plan:  58 y.o. M  w/ 4.6cm AAA, B popliteal aneurysm s/p L AK-BK popliteal bypass w/ anatomically tunneled rGSV 1/16/24 c/b surgical site hematoma s/p washout and partial closure 1/17/24. Now s/p washout and closure on 1/19/24. Doing well, ready for DC today.    Subjective:  Patient reports mild pain with dressing changes.  No other complaints at this time.    Vitals:  BP (!) 148/103   Pulse 82   Temp 98 °F (36.7 °C)   Resp 18   Ht 5' 11\" (1.803 m)   Wt 103 kg (228 lb)   SpO2 97%   BMI 31.80 kg/m²     I/Os:  I/O last 3 completed shifts:  In: 4326.3 [P.O.:1920; I.V.:1906.3; IV Piggyback:500]  Out: 5250 [Urine:5250]  I/O this shift:  In: 180 [P.O.:180]  Out: 450 [Urine:450]    Lab Results and Cultures:   Lab Results   Component Value Date    WBC 6.32 01/21/2024    HGB 7.5 (L) 01/21/2024    HCT 23.6 (L) 01/21/2024    MCV 95 01/21/2024     01/21/2024     Lab Results   Component Value Date    CALCIUM 8.2 (L) 01/21/2024    K 4.2 01/21/2024    CO2 27 01/21/2024     (H) 01/21/2024    BUN 16 01/21/2024    CREATININE 0.80 01/21/2024     Lab Results   Component Value Date    INR 1.10 01/18/2024    PROTIME 14.1 01/18/2024        Blood Culture: No results found for: \"BLOODCX\",   Urinalysis: No results found for: \"COLORU\", \"CLARITYU\", \"SPECGRAV\", \"PHUR\", \"LEUKOCYTESUR\", \"NITRITE\", \"PROTEINUA\", \"GLUCOSEU\", \"KETONESU\", \"BILIRUBINUR\", \"BLOODU\",   Urine Culture: No results found for: \"URINECX\",   Wound Culure: No results found for: \"WOUNDCULT\"    Medications:  Current Facility-Administered Medications   Medication Dose Route Frequency    acetaminophen (TYLENOL) tablet 975 mg  975 mg Oral Q8H NAWAF    apixaban (ELIQUIS) tablet 5 mg  5 mg Oral BID    aspirin chewable tablet 81 mg  81 mg Oral Daily    calcium carbonate (TUMS) chewable tablet 500 mg  500 mg Oral Daily PRN    hydrALAZINE (APRESOLINE) injection 5 mg  5 mg Intravenous Q6H PRN    HYDROmorphone (DILAUDID) " injection 0.5 mg  0.5 mg Intravenous Q4H PRN    losartan (COZAAR) tablet 100 mg  100 mg Oral Daily    nicotine (NICODERM CQ) 21 mg/24 hr TD 24 hr patch 1 patch  1 patch Transdermal Q24H    ondansetron (ZOFRAN) injection 4 mg  4 mg Intravenous Q6H PRN    oxyCODONE (ROXICODONE) immediate release tablet 10 mg  10 mg Oral Q4H PRN    oxyCODONE (ROXICODONE) IR tablet 5 mg  5 mg Oral Q4H PRN    polyethylene glycol (MIRALAX) packet 17 g  17 g Oral Daily    pravastatin (PRAVACHOL) tablet 80 mg  80 mg Oral Daily With Dinner    senna-docusate sodium (SENOKOT S) 8.6-50 mg per tablet 1 tablet  1 tablet Oral HS       Physical Exam:    General appearance: alert and oriented, in no acute distress  Skin: Skin color, texture, turgor normal. No rashes or lesions  Neurologic: Grossly normal  Head: Normocephalic, without obvious abnormality, atraumatic  Eyes: EOMI  Lungs: No distress  Heart: No edema  Abdomen: soft, NT  Extremities: motor and sensation intact    Wound/Incision:  Surgical sites healing well     Pulse exam:  DP: Right: 2+ Left: 2+  PT: Right: 2+ Left: 2+    Dayan Johnson PA-C  1/21/2024

## 2024-01-22 ENCOUNTER — TRANSITIONAL CARE MANAGEMENT (OUTPATIENT)
Dept: FAMILY MEDICINE CLINIC | Facility: CLINIC | Age: 59
End: 2024-01-22

## 2024-01-22 ENCOUNTER — TELEPHONE (OUTPATIENT)
Dept: VASCULAR SURGERY | Facility: CLINIC | Age: 59
End: 2024-01-22

## 2024-01-22 DIAGNOSIS — I72.4 POPLITEAL ARTERY ANEURYSM, BILATERAL (HCC): Primary | ICD-10-CM

## 2024-01-22 DIAGNOSIS — I82.441 ACUTE DEEP VEIN THROMBOSIS (DVT) OF TIBIAL VEIN OF RIGHT LOWER EXTREMITY (HCC): ICD-10-CM

## 2024-01-22 RX ORDER — APIXABAN 5 MG/1
5 TABLET, FILM COATED ORAL 2 TIMES DAILY
Qty: 60 TABLET | Refills: 2 | Status: SHIPPED | OUTPATIENT
Start: 2024-01-22

## 2024-01-22 NOTE — TELEPHONE ENCOUNTER
Patients spouse called , he had blood work completed before discharge yesterday and his Hgb was 7.5.  Patient had 2 surgeries while in house including pop aneurysm repair and the I&D and evaucation of hematoma   His Hgb seemed stable in hospital, but remained low.   Per spouse , she got yesterdays results in his my chart and she is alarmed at how low they are.  She has no further orders for additional bloodwork and she is alarmed because her father passed away from acute leukemia.  I will ask triage for followup recommendations.

## 2024-01-22 NOTE — TELEPHONE ENCOUNTER
Reviewed. Patient's hgb went down due to surgeries. The threshold for transfusion is Hgb <7. So long as he is not symptomatic of a low hgb and feels ok without any increased shortness of breath at regular activities or rest we can repeat CBC/PLT in 1 week. I will review results with patient at post op appointment.

## 2024-01-22 NOTE — TELEPHONE ENCOUNTER
Spoke to patients wife and reassured her that FREDY Muñoz will address at visit and I will place order for a repeat CBC, Pl to be done prior to visit.  Wife understood.

## 2024-01-23 ENCOUNTER — TELEPHONE (OUTPATIENT)
Dept: VASCULAR SURGERY | Facility: CLINIC | Age: 59
End: 2024-01-23

## 2024-01-23 NOTE — TELEPHONE ENCOUNTER
Called and spoke w/ pharmacist. She said it did need a prior-auth but they can run a discount card and he can pay $13. Called and spoke w/ pt's wife and informed her of this.

## 2024-01-23 NOTE — TELEPHONE ENCOUNTER
Vascular Nurse Navigator Post Op Call    Procedure:    Left above knee popliteal artery to below knee popliteal artery bypass with ipsilateral reversed greater saphenous vein  Exclusion of left popliteal artery aneurysm  Left greater saphenectomy    Date of Procedure: 1/16/24    Surgeon: MD Dr. Selvin López DO Dr. Anthony Allsbrook, DO     Procedure: Left - EVACUATION/ DRAINAGE HEMATOMA. EXPLORATION ON LEFT LEG BYPASS. CONTROLL OF BLEEDING     Date of Procedure: 1/17/24    Surgeon:   * Mariana Vargas DO - Primary     * Gerber Bagley DO - Assisting     * Selvin Merino DO - Jonathan    Procedure: Left - CLOSURE LEFT LOWER EXTREMITY WOUND. WASHOUT     Date of Procedure: 1/19/24    Surgeon:   * Parker Valdez MD - Primary     * Gerber Bagley DO - Assisting     * Hien Stockton MD - Assisting    Discharge Date: 1/21/24    Discharge Disposition: Home    Leg Weakness?: No    Leg Swelling?: No    Leg Numbness?: No    Chest Pain?: No    Shortness of Breath?: No    Orthopnea?: No    Anticoagulation pt was discharged on post op?: Aspirin and Apixaban (Eliquis)    Statin pt was discharged on post op?:  Rosuvastatin (Crestor)    Bleeding?: No    Uncontrolled Pain?: No    Incision Concerns?: No    Fever or Chills?: No      Reviewed discharge instructions and incision care with patient.      NEXT OFFICE VISIT SCHEDULED:  2/1/24 at 2:30 pm with FREDY Robertson at [facility]    Transportation Confirmed?: Yes      Any further questions/concerns?  Patient stated that he is doing good since discharge.  He stated that he was not able to  his pain medication (see note from earlier on this day).  He stated they were able to  the pain medication this afternoon.  He stated that his pain is better since he took the medication.  He stated that his swelling in his leg is getting better and he is elevating his leg.  Reviewed incision  care with him - wash daily with soap and water.  Reviewed discharge medications - Aspirin and Eliquis.  All questions answered.  No concerns expressed at this time.

## 2024-01-23 NOTE — TELEPHONE ENCOUNTER
Pt's wife called the office re: oxycodone rx sent to the pharmacy on 1/21. She said the pharmacy would not fill it due to an issue but she was not sure why. Informed her we would reach out to the pharmacy and we will call her back.    Called [facility]. They are currently closed. Will try again.

## 2024-01-24 ENCOUNTER — OFFICE VISIT (OUTPATIENT)
Dept: VASCULAR SURGERY | Facility: CLINIC | Age: 59
End: 2024-01-24

## 2024-01-24 ENCOUNTER — HOSPITAL ENCOUNTER (EMERGENCY)
Facility: HOSPITAL | Age: 59
Discharge: HOME | End: 2024-01-24
Attending: EMERGENCY MEDICINE
Payer: COMMERCIAL

## 2024-01-24 ENCOUNTER — TELEPHONE (OUTPATIENT)
Dept: VASCULAR SURGERY | Facility: CLINIC | Age: 59
End: 2024-01-24

## 2024-01-24 VITALS
TEMPERATURE: 97.6 F | DIASTOLIC BLOOD PRESSURE: 77 MMHG | OXYGEN SATURATION: 99 % | SYSTOLIC BLOOD PRESSURE: 111 MMHG | HEART RATE: 68 BPM | RESPIRATION RATE: 18 BRPM

## 2024-01-24 VITALS
DIASTOLIC BLOOD PRESSURE: 92 MMHG | BODY MASS INDEX: 31.8 KG/M2 | HEART RATE: 88 BPM | RESPIRATION RATE: 18 BRPM | HEIGHT: 71 IN | SYSTOLIC BLOOD PRESSURE: 146 MMHG

## 2024-01-24 DIAGNOSIS — I72.4 POPLITEAL ARTERY ANEURYSM, BILATERAL (HCC): Primary | ICD-10-CM

## 2024-01-24 DIAGNOSIS — T81.9XXA POST-OPERATIVE COMPLICATION: Primary | ICD-10-CM

## 2024-01-24 DIAGNOSIS — T14.8XXA HEMATOMA: ICD-10-CM

## 2024-01-24 LAB
ALBUMIN SERPL BCP-MCNC: 3.4 G/DL (ref 3.5–5)
ALP SERPL-CCNC: 48 U/L (ref 34–104)
ALT SERPL W P-5'-P-CCNC: 21 U/L (ref 7–52)
ANION GAP SERPL CALCULATED.3IONS-SCNC: 6 MMOL/L
ANISOCYTOSIS BLD QL SMEAR: PRESENT
APTT PPP: 28 SECONDS (ref 23–37)
AST SERPL W P-5'-P-CCNC: 16 U/L (ref 13–39)
BASOPHILS # BLD MANUAL: 0.1 THOUSAND/UL (ref 0–0.1)
BASOPHILS NFR MAR MANUAL: 1 % (ref 0–1)
BILIRUB SERPL-MCNC: 0.73 MG/DL (ref 0.2–1)
BUN SERPL-MCNC: 22 MG/DL (ref 5–25)
CALCIUM ALBUM COR SERPL-MCNC: 9.2 MG/DL (ref 8.3–10.1)
CALCIUM SERPL-MCNC: 8.7 MG/DL (ref 8.4–10.2)
CHLORIDE SERPL-SCNC: 103 MMOL/L (ref 96–108)
CO2 SERPL-SCNC: 26 MMOL/L (ref 21–32)
CREAT SERPL-MCNC: 0.91 MG/DL (ref 0.6–1.3)
EOSINOPHIL # BLD MANUAL: 0.2 THOUSAND/UL (ref 0–0.4)
EOSINOPHIL NFR BLD MANUAL: 2 % (ref 0–6)
ERYTHROCYTE [DISTWIDTH] IN BLOOD BY AUTOMATED COUNT: 13.8 % (ref 11.6–15.1)
GFR SERPL CREATININE-BSD FRML MDRD: 92 ML/MIN/1.73SQ M
GLUCOSE SERPL-MCNC: 109 MG/DL (ref 65–140)
HCT VFR BLD AUTO: 25.7 % (ref 36.5–49.3)
HGB BLD-MCNC: 8 G/DL (ref 12–17)
INR PPP: 1.11 (ref 0.84–1.19)
LYMPHOCYTES # BLD AUTO: 1.67 THOUSAND/UL (ref 0.6–4.47)
LYMPHOCYTES # BLD AUTO: 17 % (ref 14–44)
MCH RBC QN AUTO: 29.2 PG (ref 26.8–34.3)
MCHC RBC AUTO-ENTMCNC: 31.1 G/DL (ref 31.4–37.4)
MCV RBC AUTO: 94 FL (ref 82–98)
MONOCYTES # BLD AUTO: 0.2 THOUSAND/UL (ref 0–1.22)
MONOCYTES NFR BLD: 2 % (ref 4–12)
NEUTROPHILS # BLD MANUAL: 7.66 THOUSAND/UL (ref 1.85–7.62)
NEUTS BAND NFR BLD MANUAL: 2 % (ref 0–8)
NEUTS SEG NFR BLD AUTO: 76 % (ref 43–75)
PLATELET # BLD AUTO: 316 THOUSANDS/UL (ref 149–390)
PLATELET BLD QL SMEAR: ADEQUATE
PMV BLD AUTO: 9.3 FL (ref 8.9–12.7)
POLYCHROMASIA BLD QL SMEAR: PRESENT
POTASSIUM SERPL-SCNC: 4.4 MMOL/L (ref 3.5–5.3)
PROT SERPL-MCNC: 6 G/DL (ref 6.4–8.4)
PROTHROMBIN TIME: 14.2 SECONDS (ref 11.6–14.5)
RBC # BLD AUTO: 2.74 MILLION/UL (ref 3.88–5.62)
RBC MORPH BLD: PRESENT
SODIUM SERPL-SCNC: 135 MMOL/L (ref 135–147)
WBC # BLD AUTO: 9.82 THOUSAND/UL (ref 4.31–10.16)

## 2024-01-24 PROCEDURE — 99024 POSTOP FOLLOW-UP VISIT: CPT | Performed by: PHYSICIAN ASSISTANT

## 2024-01-24 PROCEDURE — 85007 BL SMEAR W/DIFF WBC COUNT: CPT | Performed by: EMERGENCY MEDICINE

## 2024-01-24 PROCEDURE — 36415 COLL VENOUS BLD VENIPUNCTURE: CPT | Performed by: EMERGENCY MEDICINE

## 2024-01-24 PROCEDURE — 99283 EMERGENCY DEPT VISIT LOW MDM: CPT

## 2024-01-24 PROCEDURE — 85730 THROMBOPLASTIN TIME PARTIAL: CPT | Performed by: EMERGENCY MEDICINE

## 2024-01-24 PROCEDURE — 96374 THER/PROPH/DIAG INJ IV PUSH: CPT

## 2024-01-24 PROCEDURE — 85027 COMPLETE CBC AUTOMATED: CPT | Performed by: EMERGENCY MEDICINE

## 2024-01-24 PROCEDURE — 85610 PROTHROMBIN TIME: CPT | Performed by: EMERGENCY MEDICINE

## 2024-01-24 PROCEDURE — 80053 COMPREHEN METABOLIC PANEL: CPT | Performed by: EMERGENCY MEDICINE

## 2024-01-24 PROCEDURE — 99285 EMERGENCY DEPT VISIT HI MDM: CPT | Performed by: EMERGENCY MEDICINE

## 2024-01-24 RX ORDER — OXYCODONE HYDROCHLORIDE 5 MG/1
5 TABLET ORAL ONCE
Status: COMPLETED | OUTPATIENT
Start: 2024-01-24 | End: 2024-01-24

## 2024-01-24 RX ORDER — HYDROMORPHONE HCL/PF 1 MG/ML
1 SYRINGE (ML) INJECTION ONCE
Status: COMPLETED | OUTPATIENT
Start: 2024-01-24 | End: 2024-01-24

## 2024-01-24 RX ORDER — HYDROMORPHONE HCL/PF 1 MG/ML
0.5 SYRINGE (ML) INJECTION ONCE
Status: DISCONTINUED | OUTPATIENT
Start: 2024-01-24 | End: 2024-01-24 | Stop reason: HOSPADM

## 2024-01-24 RX ADMIN — OXYCODONE HYDROCHLORIDE 5 MG: 5 TABLET ORAL at 19:50

## 2024-01-24 RX ADMIN — HYDROMORPHONE HYDROCHLORIDE 1 MG: 1 INJECTION, SOLUTION INTRAMUSCULAR; INTRAVENOUS; SUBCUTANEOUS at 17:02

## 2024-01-24 NOTE — ED PROVIDER NOTES
History  Chief Complaint   Patient presents with    Post-Op Infection     Pt sent over from Vascular d/t post op wound problem. Pt was told to come in to see Dr. Merino. Pt states his post op suture site is leaking, painful, red and swelling     HPI    Patient is a 57 y/o M with PMH PAD presenting with post operative problem. Pt sent to ED from vascular office for evaluation of leg wound. Pt had 3 operations last week related to a left femoral bypass. Seen for post op check and concerned for wounds leaking blood and very painful. Pt denies fever, chills. C/o pain in the left calf and thigh. Denies new numbness, tingling or weakness in the leg.     Prior to Admission Medications   Prescriptions Last Dose Informant Patient Reported? Taking?   Cholecalciferol (Vitamin D) 50 MCG (2000 UT) tablet  Self Yes No   Sig: Take 2,000 Units by mouth daily   Patient not taking: Reported on 10/30/2023   Eliquis 5 MG   No No   Sig: TAKE 1 TABLET BY MOUTH TWICE A DAY   acetaminophen (TYLENOL) 325 mg tablet   No No   Sig: Take 3 tablets (975 mg total) by mouth every 8 (eight) hours   ascorbic acid (VITAMIN C) 250 MG CHEW  Self Yes No   Sig: Chew 250 mg daily Taking 2 daily   Patient not taking: Reported on 11/20/2023   aspirin 81 mg chewable tablet   No No   Sig: Chew 1 tablet (81 mg total) daily   calcium carbonate (TUMS) 500 mg chewable tablet   No No   Sig: Chew 1 tablet (500 mg total) daily as needed for indigestion or heartburn   losartan (COZAAR) 100 MG tablet  Self No No   Sig: TAKE 1 TABLET BY MOUTH EVERY DAY   nicotine (NICODERM CQ) 21 mg/24 hr TD 24 hr patch  Self No No   Sig: Place 1 patch on the skin every 24 hours   Patient not taking: Reported on 1/24/2024   nicotine (NICODERM CQ) 21 mg/24 hr TD 24 hr patch   No No   Sig: Place 1 patch on the skin over 24 hours daily Apply a new patch every 24 hours to a clean, dry, hairless site on the upper arm or hip.   Patient not taking: Reported on 1/24/2024   oxyCODONE  (ROXICODONE) 5 immediate release tablet   No No   Sig: Take 1 tablet (5 mg total) by mouth every 4 (four) hours as needed for moderate pain for up to 10 days Max Daily Amount: 30 mg   polyethylene glycol (MIRALAX) 17 g packet   No No   Sig: Take 17 g by mouth daily for 14 days   rosuvastatin (CRESTOR) 10 MG tablet  Self No No   Sig: TAKE 1 TABLET BY MOUTH EVERY DAY   senna-docusate sodium (SENOKOT S) 8.6-50 mg per tablet   No No   Sig: Take 1 tablet by mouth daily at bedtime      Facility-Administered Medications: None       Past Medical History:   Diagnosis Date    Hypertension     Psoriasis        Past Surgical History:   Procedure Laterality Date    CLOSURE WOUND Left 1/19/2024    Procedure: CLOSURE LEFT LOWER EXTREMITY WOUND, WASHOUT;  Surgeon: Parker Valdez MD;  Location: [facility];  Service: Vascular    EVACUATION OF HEMATOMA Left 1/17/2024    Procedure: EVACUATION/ DRAINAGE HEMATOMA, EXPLORATION ON LEFT LEG BYPASS, CONTROLL OF BLEEDING;  Surgeon: Mariana Vargas DO;  Location: [facility];  Service: Vascular    KNEE SURGERY Bilateral     PA BYPASS W/VEIN FEMORAL-POPLITEAL Left 1/16/2024    Procedure: BYPASS FEMORAL-POPLITEAL -Left above knee popliteal to below knee popliteal artery bypass WITH REVERSE SAPHENOUS VEIN GRAFT; EXCLUSION OF POPLITEAL ANEURYSM;  Surgeon: Parker Valdez MD;  Location: [facility];  Service: Vascular       Family History   Problem Relation Age of Onset    Hypertension Mother     No Known Problems Father      I have reviewed and agree with the history as documented.    E-Cigarette/Vaping    E-Cigarette Use Never User      E-Cigarette/Vaping Substances    Nicotine No     THC No     CBD No     Flavoring No     Other No     Unknown No      Social History     Tobacco Use    Smoking status: Every Day     Current packs/day: 0.25     Average packs/day: 0.3 packs/day for 32.0 years (8.0 ttl pk-yrs)     Types: Cigarettes    Smokeless tobacco: Never    Tobacco  comments:     1 PPD x 15 years - As per [place]    Vaping Use    Vaping status: Never Used   Substance Use Topics    Alcohol use: Yes     Comment: rare social use one time per month    Drug use: Never        Review of Systems   Constitutional:  Negative for chills and fever.   HENT:  Negative for ear pain and sore throat.    Eyes:  Negative for pain and visual disturbance.   Respiratory:  Negative for cough and shortness of breath.    Cardiovascular:  Negative for chest pain and palpitations.   Gastrointestinal:  Negative for abdominal pain and vomiting.   Genitourinary:  Negative for dysuria and hematuria.   Musculoskeletal:  Negative for arthralgias and back pain.   Skin:  Positive for wound. Negative for color change and rash.   Neurological:  Negative for seizures and syncope.   All other systems reviewed and are negative.      Physical Exam  ED Triage Vitals [01/24/24 1604]   Temperature Pulse Respirations Blood Pressure SpO2   97.6 °F (36.4 °C) 77 16 101/59 98 %      Temp Source Heart Rate Source Patient Position - Orthostatic VS BP Location FiO2 (%)   Temporal Monitor Lying Right arm --      Pain Score       10 - Worst Possible Pain             Orthostatic Vital Signs  Vitals:    01/24/24 1604 01/24/24 1730   BP: 101/59 111/77   Pulse: 77 68   Patient Position - Orthostatic VS: Lying        Physical Exam  Vitals and nursing note reviewed.   Constitutional:       General: He is not in acute distress.     Appearance: He is well-developed. He is not toxic-appearing.   HENT:      Head: Normocephalic and atraumatic.      Right Ear: External ear normal.      Left Ear: External ear normal.      Nose: Nose normal.      Mouth/Throat:      Pharynx: Oropharynx is clear. No oropharyngeal exudate or posterior oropharyngeal erythema.   Eyes:      Extraocular Movements: Extraocular movements intact.      Conjunctiva/sclera: Conjunctivae normal.      Pupils: Pupils are equal, round, and reactive to light.   Cardiovascular:       Rate and Rhythm: Normal rate and regular rhythm.      Pulses: Normal pulses.      Heart sounds: Normal heart sounds. No murmur heard.     No friction rub. No gallop.      Comments: 1+ left DP, dopplerable PT  Pulmonary:      Effort: Pulmonary effort is normal. No respiratory distress.      Breath sounds: Normal breath sounds. No wheezing, rhonchi or rales.   Abdominal:      General: Abdomen is flat.      Palpations: Abdomen is soft.      Tenderness: There is no abdominal tenderness. There is no guarding or rebound.   Musculoskeletal:         General: Normal range of motion.      Cervical back: Normal range of motion. No rigidity.      Right lower leg: No edema.      Left lower leg: No edema.   Skin:     General: Skin is warm and dry.      Capillary Refill: Capillary refill takes less than 2 seconds.      Comments: See media for photos of wounds. Compartments are soft   Neurological:      General: No focal deficit present.      Mental Status: He is alert.   Psychiatric:         Mood and Affect: Mood normal.         ED Medications  Medications   HYDROmorphone (DILAUDID) injection 1 mg (1 mg Intravenous Given 1/24/24 1702)   oxyCODONE (ROXICODONE) IR tablet 5 mg (5 mg Oral Given 1/24/24 1950)       Diagnostic Studies  Results Reviewed       Procedure Component Value Units Date/Time    RBC Morphology Reflex Test [[id number]] Collected: 01/24/24 1702    Lab Status: Final result Specimen: Blood from Arm, Left Updated: 01/24/24 1901    CBC and differential [[id number]]  (Abnormal) Collected: 01/24/24 1702    Lab Status: Final result Specimen: Blood from Arm, Left Updated: 01/24/24 1804     WBC 9.82 Thousand/uL      RBC 2.74 Million/uL      Hemoglobin 8.0 g/dL      Hematocrit 25.7 %      MCV 94 fL      MCH 29.2 pg      MCHC 31.1 g/dL      RDW 13.8 %      MPV 9.3 fL      Platelets 316 Thousands/uL     Narrative:      This is an appended report.  These results have been appended to a previously verified report.    Manual  Differential(PHLEBS Do Not Order) [[id number]]  (Abnormal) Collected: 01/24/24 1702    Lab Status: Final result Specimen: Blood from Arm, Left Updated: 01/24/24 1804     Segmented % 76 %      Bands % 2 %      Lymphocytes % 17 %      Monocytes % 2 %      Eosinophils, % 2 %      Basophils % 1 %      Absolute Neutrophils 7.66 Thousand/uL      Lymphocytes Absolute 1.67 Thousand/uL      Monocytes Absolute 0.20 Thousand/uL      Eosinophils Absolute 0.20 Thousand/uL      Basophils Absolute 0.10 Thousand/uL      Total Counted --     RBC Morphology Present     Platelet Estimate Adequate     Anisocytosis Present     Polychromasia Present    Comprehensive metabolic panel [[id number]]  (Abnormal) Collected: 01/24/24 1702    Lab Status: Final result Specimen: Blood from Arm, Left Updated: 01/24/24 1737     Sodium 135 mmol/L      Potassium 4.4 mmol/L      Chloride 103 mmol/L      CO2 26 mmol/L      ANION GAP 6 mmol/L      BUN 22 mg/dL      Creatinine 0.91 mg/dL      Glucose 109 mg/dL      Calcium 8.7 mg/dL      Corrected Calcium 9.2 mg/dL      AST 16 U/L      ALT 21 U/L      Alkaline Phosphatase 48 U/L      Total Protein 6.0 g/dL      Albumin 3.4 g/dL      Total Bilirubin 0.73 mg/dL      eGFR 92 ml/min/1.73sq m     Narrative:      National Kid[employer] Foundation guidelines for Chronic Kidney Disease (CKD):     Stage 1 with normal or high GFR (GFR > 90 mL/min/1.73 square meters)    Stage 2 Mild CKD (GFR = 60-89 mL/min/1.73 square meters)    Stage 3A Moderate CKD (GFR = 45-59 mL/min/1.73 square meters)    Stage 3B Moderate CKD (GFR = 30-44 mL/min/1.73 square meters)    Stage 4 Severe CKD (GFR = 15-29 mL/min/1.73 square meters)    Stage 5 End Stage CKD (GFR <15 mL/min/1.73 square meters)  Note: GFR calculation is accurate only with a steady state creatinine    Protime-INR [[id number]]  (Normal) Collected: 01/24/24 1702    Lab Status: Final result Specimen: Blood from Arm, Left Updated: 01/24/24 1735     Protime 14.2 seconds      INR  1.11    APTT [[id number]]  (Normal) Collected: 01/24/24 1702    Lab Status: Final result Specimen: Blood from Arm, Left Updated: 01/24/24 1735     PTT 28 seconds                    No orders to display         Procedures  Procedures      ED Course  ED Course as of 01/26/24 1643   Wed Jan 24, 2024   1704 Vascular at bedside, expressed hematoma from leg, plan for labs, pain control, if bleeding stable and hgb stable likely dc   1724 Hemoglobin(!): 8.0                                       Medical Decision Making  57 y/o M sent in for vascular evaluation. Notified vascular team on pt's arrival and they were quickly able to evaluate the patient. Pt with stable vital signs. Management directed by the vascular team. Expressed hematoma from surgical wound at bedside as felt this likely contributing to pain and bloody drainage. Recommended checking labs to eval hgb and wbc. After labs returned overall ok, hgb improved from prior, decision made for discharge with vascular surgery f/u. All questions answered and pt discharged.     Amount and/or Complexity of Data Reviewed  Labs: ordered. Decision-making details documented in ED Course.    Risk  Prescription drug management.          Disposition  Final diagnoses:   Post-operative complication   Hematoma     Time reflects when diagnosis was documented in both MDM as applicable and the Disposition within this note       Time User Action Codes Description Comment    1/24/2024  7:39 PM Oliverio Edwards Add [T81.9XXA] Post-operative complication     1/24/2024  7:39 PM Oliverio Edwards Add [T14.8XXA] Hematoma           ED Disposition       ED Disposition   Discharge    Condition   Stable    Date/Time   Wed Jan 24, 2024  7:39 PM    Comment   Shilo Olmos discharge to home/self care.                   Follow-up Information       Follow up With Specialties Details Why Contact Info    Parker Valdez MD Vascular Surgery, General Surgery Schedule an appointment as soon as  possible for a visit   [address] [place] [address]  [address]  [facility] [address]  [phone]              Discharge Medication List as of 1/24/2024  7:40 PM        CONTINUE these medications which have NOT CHANGED    Details   acetaminophen (TYLENOL) 325 mg tablet Take 3 tablets (975 mg total) by mouth every 8 (eight) hours, Starting Sun 1/21/2024, No Print      ascorbic acid (VITAMIN C) 250 MG CHEW Chew 250 mg daily Taking 2 daily, Historical Med      aspirin 81 mg chewable tablet Chew 1 tablet (81 mg total) daily, Starting Mon 11/20/2023, Until Tue 3/19/2024, Normal      calcium carbonate (TUMS) 500 mg chewable tablet Chew 1 tablet (500 mg total) daily as needed for indigestion or heartburn, Starting Sun 1/21/2024, No Print      Cholecalciferol (Vitamin D) 50 MCG (2000 UT) tablet Take 2,000 Units by mouth daily, Historical Med      Eliquis 5 MG TAKE 1 TABLET BY MOUTH TWICE A DAY, Starting Mon 1/22/2024, Normal      losartan (COZAAR) 100 MG tablet TAKE 1 TABLET BY MOUTH EVERY DAY, Normal      !! nicotine (NICODERM CQ) 21 mg/24 hr TD 24 hr patch Place 1 patch on the skin every 24 hours, Starting Thu 12/29/2022, Normal      !! nicotine (NICODERM CQ) 21 mg/24 hr TD 24 hr patch Place 1 patch on the skin over 24 hours daily Apply a new patch every 24 hours to a clean, dry, hairless site on the upper arm or hip., Starting Sun 1/21/2024, Normal      oxyCODONE (ROXICODONE) 5 immediate release tablet Take 1 tablet (5 mg total) by mouth every 4 (four) hours as needed for moderate pain for up to 10 days Max Daily Amount: 30 mg, Starting Sun 1/21/2024, Until Wed 1/31/2024 at 2359, Normal      polyethylene glycol (MIRALAX) 17 g packet Take 17 g by mouth daily for 14 days, Starting Mon 1/22/2024, Until Mon 2/5/2024, Normal      rosuvastatin (CRESTOR) 10 MG tablet TAKE 1 TABLET BY MOUTH EVERY DAY, Normal      senna-docusate sodium (SENOKOT S) 8.6-50 mg per tablet Take 1 tablet by mouth daily at bedtime, Starting Sun 1/21/2024, Until Tue  2/20/2024, Normal       !! - Potential duplicate medications found. Please discuss with provider.        No discharge procedures on file.    PDMP Review       None             ED Provider  Attending physically available and evaluated Shilo Olmos. I managed the patient along with the ED Attending.    Electronically Signed by           Oliverio Edwards MD  01/26/24 6750

## 2024-01-24 NOTE — TELEPHONE ENCOUNTER
"Pt's wife called the office today stating that last night she noticed that the thigh incision looks \"red and angry\" and she noticed an increase in bloody drainage when she changed the dressing today. He does not have any fever/chills.    She is going to try to send a photo through [employer]. Will forward to triage once received.  "

## 2024-01-24 NOTE — TELEPHONE ENCOUNTER
Per GRACIELA Duff PA-C, Dr. Valdez wants pt to go to the ED for evaluation so they are sending him from the office.

## 2024-01-24 NOTE — TELEPHONE ENCOUNTER
Reviewed. Spoke with Merlene via phone. Recommending OV today if no availability he should go to the ED for evaluation.

## 2024-01-24 NOTE — TELEPHONE ENCOUNTER
Per GRACIELA Duff PA-C pt should be seen next week by Dr. Valdez.    Spoke w/ Eulalia in scheduling. She will start working on this.

## 2024-01-24 NOTE — PROGRESS NOTES
Assessment/Plan:    S/P LEFT femoral-popliteal bypass rSVG ([place], 1/16/24)  S/P EVACUATION/ DRAINAGE HEMATOMA. EXPLORATION ON LEFT LEG BYPASS. CONTROLL OF BLEEDING (1/17/24)  Popliteal artery aneurysm, bilateral    -Increased thigh swelling and redness (new x1 day) around the staples and bleeding  -Thigh is tender and painful  -No foot pain; no fevers, chills            -Bypass signal (non-palp with edema); DP/PT signals  -Weak DP pulse    -Original thigh incisions secured with adhesive with minimal separation; clear, bloody drainage  -Evacuation thigh and calf incisions with increased erythema, slight blister (thigh) and oozing blood  -Skin with moderate bruising; quite taut and tender; skin tear posteriorly; no heat    A/P POD #8 after above-knee to below-knee popliteal bypass with GSV. Patient reports increased swelling, redness and drainage from the incision which is likely normal postop swelling in patient after surgery on apixaban. Extremity needs close monitoring and patient should call or go to ED for worsening swelling or pain as to not adversely affect the bypass. DP/PT signals in foot which is warm.     -Continue with medical therapy apixaban, aspirin 81 and rosuvastatin 10  -+/- Antibtiotic  -Smoking cessation counseling  -Close monitoring and discussed reasons to call to be seen sooner.  -Follow-up with Dr. Valdez in 1 week - office called    L leg care  -Some walking as tolerated; okay for physical therapy  -Elevate leg during the day while at rest  -Apply Tubigrip to the lower leg  -Continue with local care cleansing with saline, dry ABD pads twice daily and as soiled  -Monitor skin for any worsening changes  -Call office if increased leg swelling, increased redness or separation of incisions    Addendum:   -Spoke with Dr. Valdez who recommends evaluation of patient in the ED by vascular fellow  -May need to hold apixaban for a few days   -Defer antibiotic since he is now going to  "ED    Subjective:      Patient ID: Shilo Olmos is a 58 y.o. male.    Patient is s/p Bypass Fem-Pop; Lt AK Pop-BK pop bypass w/ reverse saphenous vein graft ; exclusion Pop aneurysm 1/16/24 by Dr. Valdez;  Evacuation/ drainage hematoma exploration LLE bypass, control of bleeding 1/17/24 by Dr. Pinto; and closure LLE wound, washout 1/19/24 by Dr. Valdez. Pt presents to the office for bleeding and redness at incision site.  Pt has bloody drainage from the proximal end of the thigh incision. Pt has some redness and swelling at the incision site. Pt denies fevers or chills.     HPI   Mr. Olmos 58 yoM Htn, bilateral popliteal aneurysms s/p L above-knee to below-knee popliteal artery bypass with reverse greater saphenous vein on 1/16/24. On POD #1, he developed hematoma and required exploration of the bypass. Moderate amount of hematoma evacuated from the below knee popliteal exposure site. Further, additional re-opening of the incision was required due to hematoma track with treatment of anastomotic ooze controlled with 6-0 prolene and topical agent.      1/24/2024: POD #8 after bypass.  Patient called office today due to clinical change.  He and wife report that in the past day he has developed redness to the incisions which appear \"angry\" and a lot more swelling and bleeding from the incisions -  primarily the calf sutured evacuation site.  He has generally had swelling in the leg since discharge but this was slowly improving.  Yesterday, wife was able to see the knee and now today the thigh and knee are larger.  He was more active yesterday and walked more yesterday which may have increased swelling.  The thigh is tender and painful particularly during exam.  He has no foot pain.  No fevers or chills. Wife states that he had a pronounced pedal pulse on discharge but now dulled    Medical therapy includes apixaban 5 twice daily (, aspirin 81, rosuvastatin 10, nicotine patch, etc.        Operative " Findings:  -Moderated amount of hematoma evacuated from the below knee pop exposure site.  The distal anastomosis was fully exposed and noted to be intact with no signs of active bleeding. No other active bleeding source was identified. There was minor generalized oozing from surrounding tissues.     -The hematoma appeared to track up through the anatomic tunnel/tract for the bypass.  As such the distal thigh (proximal anastomotic site) incision was reopened. Additional hematoma evacuated. Dissection was carried down to the proximal anastomotic site. There was evidence of active ooze from the toe of the anastomosis. This was controlled with a pledgeted 6-0 prolene suture. Additional topical hemostatic agent to include thrombin/gelfoam as well floseal employed with good hemostasis.     -The bypass was patent and easily palpable at conclusion of case.     -The distal thigh incision was re approximated with interrupted vertical mattress 2-0 nylon suture.     -The below knee pop incision site was partially re approximated with nylon suture.  -The mid portion was left open due to concern of excessive tension and VAC.  -The wound dimension left open measured approximately 5cm x 3cm x 3cm       The following portions of the patient's history were reviewed and updated as appropriate: allergies, current medications, past family history, past medical history, past social history, past surgical history, and problem list.    Review of Systems   Constitutional: Negative.    HENT: Negative.     Eyes: Negative.    Respiratory: Negative.     Cardiovascular:  Positive for leg swelling.   Gastrointestinal: Negative.    Endocrine: Negative.    Genitourinary: Negative.    Musculoskeletal:  Positive for gait problem.        LLE pain   Skin:  Positive for color change and wound.   Allergic/Immunologic: Negative.    Neurological:  Negative for numbness.   Hematological: Negative.    Psychiatric/Behavioral: Negative.        "    Objective:    /92 (BP Location: Left arm)   Pulse 88   Resp 18   Ht 5' 11\" (1.803 m)   BMI 31.80 kg/m²        Physical Exam  Vitals and nursing note reviewed.   Constitutional:       Appearance: He is well-developed.   HENT:      Head: Normocephalic and atraumatic.   Eyes:      Pupils: Pupils are equal, round, and reactive to light.   Cardiovascular:      Rate and Rhythm: Normal rate and regular rhythm.      Pulses:           Dorsalis pedis pulses are detected w/ Doppler on the left side.        Posterior tibial pulses are detected w/ Doppler on the left side.      Heart sounds: Normal heart sounds, S1 normal and S2 normal. No murmur heard.     No friction rub. No gallop.   Pulmonary:      Effort: Pulmonary effort is normal. No accessory muscle usage or respiratory distress.      Breath sounds: Normal breath sounds. No wheezing or rales.   Abdominal:      General: Bowel sounds are normal. There is no distension.      Palpations: Abdomen is soft.      Tenderness: There is no abdominal tenderness.   Musculoskeletal:         General: No deformity. Normal range of motion.      Left lower leg: Edema (thigh << LE and foot) present.   Skin:     General: Skin is warm and dry.      Findings: No lesion or rash.      Nails: There is no clubbing.   Neurological:      Mental Status: He is alert and oriented to person, place, and time.      Comments: Grossly normal    Psychiatric:         Behavior: Behavior is cooperative.           I have reviewed and made appropriate changes to the review of systems input by the medical assistant.    Vitals:    01/24/24 1409   BP: 146/92   BP Location: Left arm   Pulse: 88   Resp: 18   Height: 5' 11\" (1.803 m)       Patient Active Problem List   Diagnosis    Essential hypertension    Leg swelling    Gout of left foot    Primary osteoarthritis of left knee    Acute deep vein thrombosis (DVT) of tibial vein of right lower extremity (HCC)    Popliteal artery aneurysm, bilateral (HCC) "       Past Surgical History:   Procedure Laterality Date    CLOSURE WOUND Left 1/19/2024    Procedure: CLOSURE LEFT LOWER EXTREMITY WOUND, WASHOUT;  Surgeon: Parker Valdez MD;  Location: [facility];  Service: Vascular    EVACUATION OF HEMATOMA Left 1/17/2024    Procedure: EVACUATION/ DRAINAGE HEMATOMA, EXPLORATION ON LEFT LEG BYPASS, CONTROLL OF BLEEDING;  Surgeon: Mariana Vargas DO;  Location: [facility];  Service: Vascular    KNEE SURGERY Bilateral     IL BYPASS W/VEIN FEMORAL-POPLITEAL Left 1/16/2024    Procedure: BYPASS FEMORAL-POPLITEAL -Left above knee popliteal to below knee popliteal artery bypass WITH REVERSE SAPHENOUS VEIN GRAFT; EXCLUSION OF POPLITEAL ANEURYSM;  Surgeon: Parker Valdez MD;  Location: [facility];  Service: Vascular       Family History   Problem Relation Age of Onset    Hypertension Mother     No Known Problems Father        Social History     Socioeconomic History    Marital status: /Civil Union     Spouse name: Not on file    Number of children: Not on file    Years of education: Not on file    Highest education level: Not on file   Occupational History    Not on file   Tobacco Use    Smoking status: Every Day     Current packs/day: 0.25     Average packs/day: 0.3 packs/day for 32.0 years (8.0 ttl pk-yrs)     Types: Cigarettes    Smokeless tobacco: Never    Tobacco comments:     1 PPD x 15 years - As per [employer]    Vaping Use    Vaping status: Never Used   Substance and Sexual Activity    Alcohol use: Yes     Comment: rare social use one time per month    Drug use: Never    Sexual activity: Yes     Partners: Female   Other Topics Concern    Not on file   Social History Narrative    Current some day smoker - As per [employer]         · Most recent tobacco use screening:   10-      · Do you currently or have you served in the [employer]:   No     As per [employer]      Social Determinants of Health     Financial Resource Strain: Not on file    Food Insecurity: No Food Insecurity (1/17/2024)    Hunger Vital Sign     Worried About Running Out of Food in the Last Year: Never true     Ran Out of Food in the Last Year: Never true   Transportation Needs: No Transportation Needs (1/17/2024)    PRAPARE - Transportation     Lack of Transportation (Medical): No     Lack of Transportation (Non-Medical): No   Physical Activity: Not on file   Stress: Not on file   Social Connections: Not on file   Intimate Partner Violence: Not on file   Housing Stability: Low Risk  (1/17/2024)    Housing Stability Vital Sign     Unable to Pay for Housing in the Last Year: No     Number of Places Lived in the Last Year: 1     Unstable Housing in the Last Year: No       No Known Allergies      Current Outpatient Medications:     acetaminophen (TYLENOL) 325 mg tablet, Take 3 tablets (975 mg total) by mouth every 8 (eight) hours, Disp: , Rfl:     aspirin 81 mg chewable tablet, Chew 1 tablet (81 mg total) daily, Disp: 30 tablet, Rfl: 3    calcium carbonate (TUMS) 500 mg chewable tablet, Chew 1 tablet (500 mg total) daily as needed for indigestion or heartburn, Disp: , Rfl:     Eliquis 5 MG, TAKE 1 TABLET BY MOUTH TWICE A DAY, Disp: 60 tablet, Rfl: 2    losartan (COZAAR) 100 MG tablet, TAKE 1 TABLET BY MOUTH EVERY DAY, Disp: 90 tablet, Rfl: 1    oxyCODONE (ROXICODONE) 5 immediate release tablet, Take 1 tablet (5 mg total) by mouth every 4 (four) hours as needed for moderate pain for up to 10 days Max Daily Amount: 30 mg, Disp: 15 tablet, Rfl: 0    polyethylene glycol (MIRALAX) 17 g packet, Take 17 g by mouth daily for 14 days, Disp: 238 g, Rfl: 0    rosuvastatin (CRESTOR) 10 MG tablet, TAKE 1 TABLET BY MOUTH EVERY DAY, Disp: 90 tablet, Rfl: 1    senna-docusate sodium (SENOKOT S) 8.6-50 mg per tablet, Take 1 tablet by mouth daily at bedtime, Disp: 30 tablet, Rfl: 0    ascorbic acid (VITAMIN C) 250 MG CHEW, Chew 250 mg daily Taking 2 daily (Patient not taking: Reported on 11/20/2023),  Disp: , Rfl:     Cholecalciferol (Vitamin D) 50 MCG (2000 UT) tablet, Take 2,000 Units by mouth daily (Patient not taking: Reported on 10/30/2023), Disp: , Rfl:     nicotine (NICODERM CQ) 21 mg/24 hr TD 24 hr patch, Place 1 patch on the skin every 24 hours (Patient not taking: Reported on 1/24/2024), Disp: 28 patch, Rfl: 0    nicotine (NICODERM CQ) 21 mg/24 hr TD 24 hr patch, Place 1 patch on the skin over 24 hours daily Apply a new patch every 24 hours to a clean, dry, hairless site on the upper arm or hip. (Patient not taking: Reported on 1/24/2024), Disp: 28 patch, Rfl: 0

## 2024-01-24 NOTE — ED ATTENDING ATTESTATION
I, Brendan Coreas MD, saw and evaluated the patient. I have discussed the patient with the resident and agree with the resident's findings, Plan of Care, and MDM as documented in the resident's note, except where noted. All available labs and Radiology studies were reviewed.  I was present for key portions of any procedure(s) performed by the resident and I was immediately available to provide assistance.    At this point I agree with the current assessment done in the Emergency Department.  I have conducted an independent evaluation of this patient a history and physical is as follows:    57 yo male with a history of HTN, psoriasis, bilateral popliteal aneurysms, and s/p left fem-pop bypass on 1/16/24 with two follow up procedures last week sent to the ED from the Vascular Surgery clinic for a post-op check.  The patient reports increased pain, redness, swelling, and bleeding at the surgical site x 1 day. No fevers or chills. He denies numbness and weakness in the leg. He was seen in the office today and the provider documented a weak DP pulse on the left. Wife says this is new. No falls or trauma to the leg. No other injuries or complaints.    ROS: per resident physician note    Gen: NAD, AA&Ox3  HEENT: PERRL, EOMI  Neck: supple  CV: RRR  Lungs: CTA B/L  Abdomen: soft, NT/ND  LLE: (+) surgical incisions to thigh and calf, (+) erythema/swelling/tenderness, (+) scant bloody drainage from thigh woound, (+) palpable DP pulse, distal sensation intact, extremity warm and well perfused  Neuro: 5/5 strength all extremities, sensation grossly intact  Skin: no rash    ED Course  The patient is comfortable appearing with stable vital signs despite his complaints. LLE surgical site is erythematous, indurated, and tender with some scant bloody drainage. Case discussed with Vascular --> they will come to the ED to evaluate the patient. Basic labs and coags sent to the lab. IV Dilaudid administered, will continue to monitor  in the ED. Disposition per workup, reassessment, and consultant recommendations.      Critical Care Time  Procedures

## 2024-01-24 NOTE — PATIENT INSTRUCTIONS
A/P POD #8 after above-knee to below-knee popliteal bypass with GSV. Patient reports increased swelling, redness and drainage from the incision which is likely normal postop swelling in patient after surgery on apixaban. Extremity will need close monitoring and any worsening swelling or pain should go to ED or call office. Signals in foot.     -Continue with medical therapy apixaban, aspirin 81 and rosuvastatin 10  -+/- antibtiotic  -Smoking cessation counseling  -Close monitoring and discussed reasons to call to be seen sooner.  -Follow-up with Dr. Valdez in 1 week    L leg care  -Some walking as tolerated; okay for physical therapy  -Elevate leg during the day while at rest  -Apply Tubigrip to the lower leg  -Continue with local care cleansing with saline, dry ABD pads twice daily and as soiled  -Monitor skin for any worsening changes  -Call office if increased leg swelling, increased redness or separation of incisions    Addendum:   -Spoke with Dr. Valdez who recommends evaluation of patient in the ED by vascular fellow  -May need to hold apixaban for a few days       Tobacco use is a significant patient-modifiable risk factor for this patient’s vascular disease with multiple vascular comorbidities, and a significant risk factor for failure of and complications from any endovascular or surgical interventions.    It is never too late to quit, and many studies show significant health benefits as well as economical savings after smoking cessation. I offered to the patient nicotine replacement therapy as well as referral to the smoking cessation program and access to the quit line 4-594-YAPZWMH

## 2024-01-25 NOTE — DISCHARGE INSTRUCTIONS
Please follow up with the vascular surgery team.     If you develop new or worsening symptoms, please return to the Emergency Department for further evaluation.

## 2024-01-29 ENCOUNTER — APPOINTMENT (OUTPATIENT)
Dept: LAB | Facility: CLINIC | Age: 59
End: 2024-01-29
Payer: COMMERCIAL

## 2024-01-29 DIAGNOSIS — E78.5 MILD HYPERLIPIDEMIA: ICD-10-CM

## 2024-01-29 DIAGNOSIS — I72.4 POPLITEAL ARTERY ANEURYSM, BILATERAL (HCC): ICD-10-CM

## 2024-01-29 DIAGNOSIS — Z12.5 PROSTATE CANCER SCREENING: ICD-10-CM

## 2024-01-29 LAB
ERYTHROCYTE [DISTWIDTH] IN BLOOD BY AUTOMATED COUNT: 13.7 % (ref 11.6–15.1)
HCT VFR BLD AUTO: 28.6 % (ref 36.5–49.3)
HGB BLD-MCNC: 8.8 G/DL (ref 12–17)
MCH RBC QN AUTO: 29.4 PG (ref 26.8–34.3)
MCHC RBC AUTO-ENTMCNC: 30.8 G/DL (ref 31.4–37.4)
MCV RBC AUTO: 96 FL (ref 82–98)
PLATELET # BLD AUTO: 406 THOUSANDS/UL (ref 149–390)
PMV BLD AUTO: 9.6 FL (ref 8.9–12.7)
RBC # BLD AUTO: 2.99 MILLION/UL (ref 3.88–5.62)
WBC # BLD AUTO: 7.6 THOUSAND/UL (ref 4.31–10.16)

## 2024-01-29 PROCEDURE — 85027 COMPLETE CBC AUTOMATED: CPT

## 2024-01-29 PROCEDURE — 36415 COLL VENOUS BLD VENIPUNCTURE: CPT

## 2024-02-01 ENCOUNTER — OFFICE VISIT (OUTPATIENT)
Dept: VASCULAR SURGERY | Facility: CLINIC | Age: 59
End: 2024-02-01

## 2024-02-01 VITALS
HEART RATE: 74 BPM | OXYGEN SATURATION: 98 % | WEIGHT: 222 LBS | RESPIRATION RATE: 20 BRPM | SYSTOLIC BLOOD PRESSURE: 110 MMHG | HEIGHT: 71 IN | BODY MASS INDEX: 31.08 KG/M2 | DIASTOLIC BLOOD PRESSURE: 72 MMHG

## 2024-02-01 DIAGNOSIS — I72.4 POPLITEAL ARTERY ANEURYSM, BILATERAL (HCC): Primary | ICD-10-CM

## 2024-02-01 PROCEDURE — 99024 POSTOP FOLLOW-UP VISIT: CPT | Performed by: NURSE PRACTITIONER

## 2024-02-01 NOTE — LETTER
February 1, 2024     Patient: Shilo Olmos  YOB: 1965  Date of Visit: 2/1/2024      To Whom it May Concern:    Shilo Olmos is under my professional care.  He had a vascular procedure on 1/16/24.  He will need his spouse, Jerry Correa, to be available to him at home to assist with activities of daily living during his postoperative period.  He has a postoperative visit 2/9/24 and his postoperative recovery will be assessed at that time.      If you have any questions or concerns, please don't hesitate to call.       Sincerely,          FREDY Muñoz        CC: No Recipients

## 2024-02-01 NOTE — PROGRESS NOTES
Assessment/Plan:    Popliteal artery aneurysm, bilateral (HCC)  58-year-old male former smoker with HTN, HLD, OA knee, 4.4 cm infrarenal AAA, right PT and soleal vein DVT 9/27/23, bilateral popliteal artery aneurysms w/ thrombosed right popliteal artery aneurysm and symptomatic left popliteal aneurysm s/p left femoral to above-knee popliteal bypass graft with rGSV and anuerysmal exclusion by Dr Valdez 1/16/24 c/b postoperative hematoma s/p washout and pledget to prox anastomosis by Dr Pinto 1/17/24 and s/p wound closure by Dr Valdez 1/19/24. Patient presents to the office for postoperative wound check     -Left thigh and calf incisions intact. Sutures removed from thigh incision and partial calf incisional sutures removed.  Superficial dehiscence of mid thigh incision after suture removal  -placed Steri-Strip x 1. Scant serosanguineous drainage at proximal calf incision. No erythema. Nearly resolved ecchymosis at the proximal thigh. Postoperative LLE swelling. Clinical images on chart.    -Palpable DP pulse, not able to palpate bypass pulse due to swelling and pain at medial distal thigh and knee  -Reviewed incisional care. Cleanse daily with soap and water, pat dry. Cover Incision with 4 x 4 gauze and Kerlix  -Continue to use Ace wrap and Tubigrip for lower extremity swelling  -Continue ASA, Eliquis, Crestor   -1 week follow up for incision check and suture removal   -Will consider PT evaluation due to left knee pain, OA and swelling as postoperative course improves   -Postoperative LEAD/AOIL in 3 months   -Follow up with Dr Valdez after AOIL/LEAD. Will also discuss Eliquis at that time        Diagnoses and all orders for this visit:    Popliteal artery aneurysm, bilateral (HCC)  -     VAS ARTERIAL DUPLEX- LOWER LIMB BILATERAL; Future      Subjective:      Patient ID: Shilo Olmos is a 58 y.o. male.    Patient is s/p Lt AK Pop- BK Pop bypass w/ reverse GSV; exclusion of Pop aneurysm on 1/16/24   [facility]; Evacuation/ drainage hematoma, exploration of LLE bypass, control of bleeding 1/17/24 Dr. Pinto; and LLE wound washout and closure 1/19/24 by Dr. Valdez. Pt states the incision sites have stopped draining over the past day or two. Pt ambulates with a cane. Pt denies fevers, chills, numbness, or tingling.     HPI  58-year-old male former smoker with HTN, HLD, OA knee, 4.4 cm infrarenal AAA, right PT and soleal vein DVT 9/27/23, bilateral popliteal artery aneurysms w/ thrombosed right popliteal artery aneurysm and symptomatic left popliteal aneurysm s/p left femoral to above-knee popliteal bypass graft with rGSV and anuerysmal exclusion by Dr Valdez 1/16/24 c/b postoperative hematoma s/p washout and pledget to prox anastomosis by Dr Pinto 1/17/24 and s/p wound closure by Dr Valdez 1/19/24. Patient presents to the office for postoperative wound check   Patient was in the office 1/24 there was some concern in regards to incision and bypass therefore patient was evaluated in the emergency room.   He is accompanied by his wife for office visit today.  Mainly concerned about left lower extremity swelling which is expected postoperatively given surgeries.  Incisions are intact.  Left proximal thigh ecchymosis is resolving.  Scant amount of serosanguineous drainage at left  calf incision.  He had a repeat hemoglobin which has come up to 8.8.  The following portions of the patient's history were reviewed and updated as appropriate: allergies, current medications, past family history, past medical history, past social history, past surgical history, and problem list.  ROS reviewed     Review of Systems   Constitutional: Negative.    HENT: Negative.     Eyes: Negative.    Respiratory: Negative.     Cardiovascular:  Positive for leg swelling.   Gastrointestinal: Negative.    Endocrine: Negative.    Genitourinary: Negative.    Musculoskeletal:  Positive for gait problem and joint swelling (Lt knee and  "ankle).   Skin: Negative.    Allergic/Immunologic: Negative.    Neurological:  Negative for weakness and numbness.   Hematological: Negative.    Psychiatric/Behavioral: Negative.           Objective:    I have reviewed and made appropriate changes to the review of systems input by the medical assistant.    Vitals:    02/01/24 1408   BP: 110/72   BP Location: Left arm   Patient Position: Sitting   Pulse: 74   Resp: 20   SpO2: 98%   Weight: 101 kg (222 lb)   Height: 5' 11\" (1.803 m)       Patient Active Problem List   Diagnosis    Essential hypertension    Leg swelling    Gout of left foot    Primary osteoarthritis of left knee    Acute deep vein thrombosis (DVT) of tibial vein of right lower extremity (HCC)    Popliteal artery aneurysm, bilateral (HCC)       Past Surgical History:   Procedure Laterality Date    CLOSURE WOUND Left 1/19/2024    Procedure: CLOSURE LEFT LOWER EXTREMITY WOUND, WASHOUT;  Surgeon: Parker Valdez MD;  Location: [facility];  Service: Vascular    EVACUATION OF HEMATOMA Left 1/17/2024    Procedure: EVACUATION/ DRAINAGE HEMATOMA, EXPLORATION ON LEFT LEG BYPASS, CONTROLL OF BLEEDING;  Surgeon: Mariana Vargas DO;  Location: [facility];  Service: Vascular    KNEE SURGERY Bilateral     DE BYPASS W/VEIN FEMORAL-POPLITEAL Left 1/16/2024    Procedure: BYPASS FEMORAL-POPLITEAL -Left above knee popliteal to below knee popliteal artery bypass WITH REVERSE SAPHENOUS VEIN GRAFT; EXCLUSION OF POPLITEAL ANEURYSM;  Surgeon: Parker Valdez MD;  Location: [facility];  Service: Vascular       Family History   Problem Relation Age of Onset    Hypertension Mother     No Known Problems Father        Social History     Socioeconomic History    Marital status: /Civil Union     Spouse name: Not on file    Number of children: Not on file    Years of education: Not on file    Highest education level: Not on file   Occupational History    Not on file   Tobacco Use    Smoking status: " Every Day     Current packs/day: 0.25     Average packs/day: 0.3 packs/day for 32.0 years (8.0 ttl pk-yrs)     Types: Cigarettes    Smokeless tobacco: Never    Tobacco comments:     1 PPD x 15 years - As per [facility]    Vaping Use    Vaping status: Never Used   Substance and Sexual Activity    Alcohol use: Yes     Comment: rare social use one time per month    Drug use: Never    Sexual activity: Yes     Partners: Female   Other Topics Concern    Not on file   Social History Narrative    Current some day smoker - As per [employer]         · Most recent tobacco use screening:   10-      · Do you currently or have you served in the [employer]:   No     As per [facility]      Social Determinants of Health     Financial Resource Strain: Not on file   Food Insecurity: No Food Insecurity (1/17/2024)    Hunger Vital Sign     Worried About Running Out of Food in the Last Year: Never true     Ran Out of Food in the Last Year: Never true   Transportation Needs: No Transportation Needs (1/17/2024)    PRAPARE - Transportation     Lack of Transportation (Medical): No     Lack of Transportation (Non-Medical): No   Physical Activity: Not on file   Stress: Not on file   Social Connections: Not on file   Intimate Partner Violence: Not on file   Housing Stability: Low Risk  (1/17/2024)    Housing Stability Vital Sign     Unable to Pay for Housing in the Last Year: No     Number of Places Lived in the Last Year: 1     Unstable Housing in the Last Year: No       No Known Allergies      Current Outpatient Medications:     acetaminophen (TYLENOL) 325 mg tablet, Take 3 tablets (975 mg total) by mouth every 8 (eight) hours, Disp: , Rfl:     aspirin 81 mg chewable tablet, Chew 1 tablet (81 mg total) daily, Disp: 30 tablet, Rfl: 3    calcium carbonate (TUMS) 500 mg chewable tablet, Chew 1 tablet (500 mg total) daily as needed for indigestion or heartburn, Disp: , Rfl:     Eliquis 5 MG, TAKE 1 TABLET BY MOUTH TWICE A DAY, Disp: 60  "tablet, Rfl: 2    losartan (COZAAR) 100 MG tablet, TAKE 1 TABLET BY MOUTH EVERY DAY, Disp: 90 tablet, Rfl: 1    rosuvastatin (CRESTOR) 10 MG tablet, TAKE 1 TABLET BY MOUTH EVERY DAY, Disp: 90 tablet, Rfl: 1    ascorbic acid (VITAMIN C) 250 MG CHEW, Chew 250 mg daily Taking 2 daily (Patient not taking: Reported on 11/20/2023), Disp: , Rfl:     Cholecalciferol (Vitamin D) 50 MCG (2000 UT) tablet, Take 2,000 Units by mouth daily (Patient not taking: Reported on 10/30/2023), Disp: , Rfl:     /72 (BP Location: Left arm, Patient Position: Sitting)   Pulse 74   Resp 20   Ht 5' 11\" (1.803 m)   Wt 101 kg (222 lb)   SpO2 98%   BMI 30.96 kg/m²          Physical Exam  Vitals and nursing note reviewed. Exam conducted with a chaperone present.   Constitutional:       Appearance: Normal appearance.   HENT:      Head: Normocephalic and atraumatic.   Eyes:      Extraocular Movements: Extraocular movements intact.   Cardiovascular:      Heart sounds: Normal heart sounds.      Comments: Nonpalpable right DP pulse.  Left DP pulses easily palpable.  Not able to appreciate bypass graft pulse on the left due to swelling and discomfort with palpation to the area   Pulmonary:      Effort: Pulmonary effort is normal.   Musculoskeletal:         General: Swelling present.   Skin:     Comments: Moderate postoperative left lower extremity swelling.  Thigh incisions intact.  Sutures removed.  Superficial dehiscence at mid thigh incision 1 single Steri-Strip was applied.  Scant amount of serosanguineous drainage from left calf incision.  No erythema.  Palpable DP pulse   Neurological:      General: No focal deficit present.      Mental Status: He is oriented to person, place, and time.                     "

## 2024-02-01 NOTE — ASSESSMENT & PLAN NOTE
58-year-old male former smoker with HTN, HLD, OA knee, 4.4 cm infrarenal AAA, right PT and soleal vein DVT 9/27/23, bilateral popliteal artery aneurysms w/ thrombosed right popliteal artery aneurysm and symptomatic left popliteal aneurysm s/p left femoral to above-knee popliteal bypass graft with rGSV and anuerysmal exclusion by Dr Valdez 1/16/24 c/b postoperative hematoma s/p washout and pledget to prox anastomosis by Dr Pinto 1/17/24 and s/p wound closure by Dr Valdez 1/19/24. Patient presents to the office for postoperative wound check     -Left thigh and calf incisions intact. Sutures removed from thigh incision and partial calf incisional sutures removed.  Superficial dehiscence of mid thigh incision after suture removal  -placed Steri-Strip x 1. Scant serosanguineous drainage at proximal calf incision. No erythema. Nearly resolved ecchymosis at the proximal thigh. Postoperative LLE swelling. Clinical images on chart.    -Palpable DP pulse, not able to palpate bypass pulse due to swelling and pain at medial distal thigh and knee  -Reviewed incisional care. Cleanse daily with soap and water, pat dry. Cover Incision with 4 x 4 gauze and Kerlix  -Continue to use Ace wrap and Tubigrip for lower extremity swelling  -Continue ASA, Eliquis, Crestor   -1 week follow up for incision check and suture removal   -Will consider PT evaluation due to left knee pain, OA and swelling as postoperative course improves   -Postoperative LEAD/AOIL in 3 months   -Follow up with Dr Valdez after AOIL/LEAD. Will also discuss Eliquis at that time

## 2024-02-08 PROBLEM — Z95.828 S/P FEMORAL-POPLITEAL BYPASS SURGERY: Status: ACTIVE | Noted: 2024-02-08

## 2024-02-08 NOTE — PATIENT INSTRUCTIONS
L leg care:  -Continue to wash with gentle soap and water  -No submerging in tubs or bathing  -May apply Betadine over scabbed incisional areas  -Activity with walking as tolerated  -Raise leg periodically during the day to help with edema  -Ace wrap and gentle Tubigrip may be applied also to help with swelling    -Smoking cessation  -Continue with aspirin, Eliquis 5 BID and rosuvastatin 10

## 2024-02-09 ENCOUNTER — OFFICE VISIT (OUTPATIENT)
Dept: VASCULAR SURGERY | Facility: CLINIC | Age: 59
End: 2024-02-09

## 2024-02-09 VITALS
DIASTOLIC BLOOD PRESSURE: 72 MMHG | HEIGHT: 71 IN | OXYGEN SATURATION: 98 % | RESPIRATION RATE: 18 BRPM | BODY MASS INDEX: 30.96 KG/M2 | HEART RATE: 77 BPM | SYSTOLIC BLOOD PRESSURE: 108 MMHG

## 2024-02-09 DIAGNOSIS — M79.89 LEG SWELLING: ICD-10-CM

## 2024-02-09 DIAGNOSIS — I72.4 POPLITEAL ARTERY ANEURYSM, BILATERAL (HCC): Primary | ICD-10-CM

## 2024-02-09 DIAGNOSIS — Z95.828 S/P FEMORAL-POPLITEAL BYPASS SURGERY: ICD-10-CM

## 2024-02-09 PROCEDURE — 99024 POSTOP FOLLOW-UP VISIT: CPT | Performed by: PHYSICIAN ASSISTANT

## 2024-02-09 NOTE — LETTER
"February 9, 2024     Parker Valdez MD  [address]  [address]  [place] [address]    Patient: Shilo Olmos   YOB: 1965   Date of Visit: 2/9/2024       Dear Dr. Valdez:    Thank you for referring Shilo Olmos to me for evaluation. Below are my notes for this consultation.    If you have questions, please do not hesitate to call me. I look forward to following your patient along with you.         Sincerely,        Jaimie Duff PA-C        CC: No Recipients    Jaimie Duff PA-C  2/9/2024  9:48 AM  Incomplete  Assessment/Plan:                         {Assess/PlanSmartLinks:75988}      Subjective:      Patient ID: Shilo Olmos is a 58 y.o. male.    Patient presents to the office for an incision site re-check. Pt is s/p Lt Fem-Pop bypass rSCG by Dr. Valdez on 1/16/24; s/p evacuation/ drainage hematoma, exploration on Lt leg bypass control of bleeding 1/17/24, and  Closure LLE wound, washout 1/19/24. Pt states that his leg is doing better. Pt has mild discharge from the incision sites, and states the discharge is lessening. Pt has increased his walking and gets \"Jaret horses\".       HPI          S/P LEFT femoral-popliteal bypass rSVG (Courtney, 1/16/24)    S/P EVACUATION/ DRAINAGE HEMATOMA. EXPLORATION ON LEFT LEG BYPASS. CONTROLL OF BLEEDING (1/17/24)    Popliteal artery aneurysm, bilateral          POD #23          Mr. Olmos 58 yoM Htn, bilateral popliteal aneurysms s/p L above-knee to below-knee popliteal artery bypass with reverse greater saphenous vein on 1/16/24. On POD #1, he developed hematoma and required exploration of the bypass. Moderate amount of hematoma evacuated from the below knee popliteal exposure site. Further, additional re-opening of the incision was required due to hematoma track with treatment of anastomotic ooze controlled with 6-0 prolene and topical agent.      1/24/2024: POD #8 after bypass.  Patient called office today due to clinical change. " " He and wife report that in the past day he has developed redness to the incisions which appear \"angry\" and a lot more swelling and bleeding from the incisions -  primarily the calf sutured evacuation site.  He has generally had swelling in the leg since discharge but this was slowly improving.  Yesterday, wife was able to see the knee and now today the thigh and knee are larger.  He was more active yesterday and walked more yesterday which may have increased swelling.  The thigh is tender and painful particularly during exam.  He has no foot pain.  No fevers or chills. Wife states that he had a pronounced pedal pulse on discharge but now dulled     Medical therapy includes apixaban 5 twice daily,, aspirin 81, rosuvastatin 10, nicotine patch, etc.              2/9/24:           Operative Findings:  -Moderated amount of hematoma evacuated from the below knee pop exposure site.  The distal anastomosis was fully exposed and noted to be intact with no signs of active bleeding. No other active bleeding source was identified. There was minor generalized oozing from surrounding tissues.     -The hematoma appeared to track up through the anatomic tunnel/tract for the bypass.  As such the distal thigh (proximal anastomotic site) incision was reopened. Additional hematoma evacuated. Dissection was carried down to the proximal anastomotic site. There was evidence of active ooze from the toe of the anastomosis. This was controlled with a pledgeted 6-0 prolene suture. Additional topical hemostatic agent to include thrombin/gelfoam as well floseal employed with good hemostasis.     -The bypass was patent and easily palpable at conclusion of case.     -The distal thigh incision was re approximated with interrupted vertical mattress 2-0 nylon suture.      -The below knee pop incision site was partially re approximated with nylon suture.  -The mid portion was left open due to concern of excessive tension and VAC.  -The wound " "dimension left open measured approximately 5cm x 3cm x 3cm            The following portions of the patient's history were reviewed and updated as appropriate: allergies, current medications, past family history, past medical history, past social history, past surgical history, and problem list.    Review of Systems   Constitutional: Negative.    HENT: Negative.     Eyes: Negative.    Respiratory: Negative.     Cardiovascular:  Positive for leg swelling.   Gastrointestinal: Negative.    Endocrine: Negative.    Genitourinary: Negative.    Musculoskeletal:  Positive for gait problem.   Skin:  Positive for color change.   Allergic/Immunologic: Negative.    Neurological:  Negative for dizziness and numbness.   Hematological: Negative.    Psychiatric/Behavioral: Negative.           Objective:      /72 (BP Location: Left arm, Patient Position: Sitting)   Pulse 77   Resp 18   Ht 5' 11\" (1.803 m)   SpO2 98%   BMI 30.96 kg/m²          Physical Exam           I have reviewed and made appropriate changes to the review of systems input by the medical assistant.    Vitals:    02/09/24 0920   BP: 108/72   BP Location: Left arm   Patient Position: Sitting   Pulse: 77   Resp: 18   SpO2: 98%   Height: 5' 11\" (1.803 m)       Patient Active Problem List   Diagnosis   • Essential hypertension   • Leg swelling   • Gout of left foot   • Primary osteoarthritis of left knee   • Acute deep vein thrombosis (DVT) of tibial vein of right lower extremity (HCC)   • Popliteal artery aneurysm, bilateral (HCC)   • S/P femoral-popliteal bypass surgery       Past Surgical History:   Procedure Laterality Date   • CLOSURE WOUND Left 1/19/2024    Procedure: CLOSURE LEFT LOWER EXTREMITY WOUND, WASHOUT;  Surgeon: Parker Valdez MD;  [facility];  Service: Vascular   • EVACUATION OF HEMATOMA Left 1/17/2024    Procedure: EVACUATION/ DRAINAGE HEMATOMA, EXPLORATION ON LEFT LEG BYPASS, CONTROLL OF BLEEDING;  Surgeon: Mariana" DO Alicia;  Location[facility];  Service: Vascular   • KNEE SURGERY Bilateral    • ME BYPASS W/VEIN FEMORAL-POPLITEAL Left 1/16/2024    Procedure: BYPASS FEMORAL-POPLITEAL -Left above knee popliteal to below knee popliteal artery bypass WITH REVERSE SAPHENOUS VEIN GRAFT; EXCLUSION OF POPLITEAL ANEURYSM;  Surgeon: Parker Valdez MD;  [facility];  Service: Vascular       Family History   Problem Relation Age of Onset   • Hypertension Mother    • No Known Problems Father        Social History     Socioeconomic History   • Marital status: /Civil Union     Spouse name: Not on file   • Number of children: Not on file   • Years of education: Not on file   • Highest education level: Not on file   Occupational History   • Not on file   Tobacco Use   • Smoking status: Every Day     Current packs/day: 0.25     Average packs/day: 0.3 packs/day for 32.0 years (8.0 ttl pk-yrs)     Types: Cigarettes   • Smokeless tobacco: Never   • Tobacco comments:     1 PPD x 15 years - As per [employer]    Vaping Use   • Vaping status: Never Used   Substance and Sexual Activity   • Alcohol use: Yes     Comment: rare social use one time per month   • Drug use: Never   • Sexual activity: Yes     Partners: Female   Other Topics Concern   • Not on file   Social History Narrative    Current some day smoker - As per [employer]         · Most recent tobacco use screening:   10-      · Do you currently or have you served in the [employer]:   No     As per [employer]      Social Determinants of Health     Financial Resource Strain: Not on file   Food Insecurity: No Food Insecurity (1/17/2024)    Hunger Vital Sign    • Worried About Running Out of Food in the Last Year: Never true    • Ran Out of Food in the Last Year: Never true   Transportation Needs: No Transportation Needs (1/17/2024)    PRAPARE - Transportation    • Lack of Transportation (Medical): No    • Lack of Transportation (Non-Medical): No   Physical  Activity: Not on file   Stress: Not on file   Social Connections: Not on file   Intimate Partner Violence: Not on file   Housing Stability: Low Risk  (1/17/2024)    Housing Stability Vital Sign    • Unable to Pay for Housing in the Last Year: No    • Number of Places Lived in the Last Year: 1    • Unstable Housing in the Last Year: No       No Known Allergies      Current Outpatient Medications:   •  acetaminophen (TYLENOL) 325 mg tablet, Take 3 tablets (975 mg total) by mouth every 8 (eight) hours, Disp: , Rfl:   •  aspirin 81 mg chewable tablet, Chew 1 tablet (81 mg total) daily, Disp: 30 tablet, Rfl: 3  •  calcium carbonate (TUMS) 500 mg chewable tablet, Chew 1 tablet (500 mg total) daily as needed for indigestion or heartburn, Disp: , Rfl:   •  Eliquis 5 MG, TAKE 1 TABLET BY MOUTH TWICE A DAY, Disp: 60 tablet, Rfl: 2  •  losartan (COZAAR) 100 MG tablet, TAKE 1 TABLET BY MOUTH EVERY DAY, Disp: 90 tablet, Rfl: 1  •  rosuvastatin (CRESTOR) 10 MG tablet, TAKE 1 TABLET BY MOUTH EVERY DAY, Disp: 90 tablet, Rfl: 1  •  ascorbic acid (VITAMIN C) 250 MG CHEW, Chew 250 mg daily Taking 2 daily (Patient not taking: Reported on 11/20/2023), Disp: , Rfl:   •  Cholecalciferol (Vitamin D) 50 MCG (2000 UT) tablet, Take 2,000 Units by mouth daily (Patient not taking: Reported on 10/30/2023), Disp: , Rfl:             Jaimie Duff PA-C  2/9/2024  9:38 AM  Incomplete  Assessment/Plan:    No problem-specific Assessment & Plan notes found for this encounter.       {Assess/PlanSmartLinks:89968}      Subjective:      Patient ID: Shilo Olmos is a 58 y.o. male.    Patient presents to the office for an incision site re-check. Pt is s/p Lt Fem-Pop bypass rSCG by Dr. Valdez on 1/16/24; s/p evacuation/ drainage hematoma, exploration on Lt leg bypass control of bleeding 1/17/24, and  Closure LLE wound, washout 1/19/24. Pt states that his leg is doing better. Pt has mild discharge from the incision sites, and states the discharge is  "lessening. Pt has increased his walking and gets \"Jaret horses\".       HPI          S/P LEFT femoral-popliteal bypass rSVG (Courtney, 1/16/24)    S/P EVACUATION/ DRAINAGE HEMATOMA. EXPLORATION ON LEFT LEG BYPASS. CONTROLL OF BLEEDING (1/17/24)    Popliteal artery aneurysm, bilateral          POD #23          Mr. Olmos 58 yoM Htn, bilateral popliteal aneurysms s/p L above-knee to below-knee popliteal artery bypass with reverse greater saphenous vein on 1/16/24. On POD #1, he developed hematoma and required exploration of the bypass. Moderate amount of hematoma evacuated from the below knee popliteal exposure site. Further, additional re-opening of the incision was required due to hematoma track with treatment of anastomotic ooze controlled with 6-0 prolene and topical agent.      1/24/2024: POD #8 after bypass.  Patient called office today due to clinical change.  He and wife report that in the past day he has developed redness to the incisions which appear \"angry\" and a lot more swelling and bleeding from the incisions -  primarily the calf sutured evacuation site.  He has generally had swelling in the leg since discharge but this was slowly improving.  Yesterday, wife was able to see the knee and now today the thigh and knee are larger.  He was more active yesterday and walked more yesterday which may have increased swelling.  The thigh is tender and painful particularly during exam.  He has no foot pain.  No fevers or chills. Wife states that he had a pronounced pedal pulse on discharge but now dulled     Medical therapy includes apixaban 5 twice daily,, aspirin 81, rosuvastatin 10, nicotine patch, etc.              2/9/24:           Operative Findings:  -Moderated amount of hematoma evacuated from the below knee pop exposure site.  The distal anastomosis was fully exposed and noted to be intact with no signs of active bleeding. No other active bleeding source was identified. There was minor generalized oozing " "from surrounding tissues.     -The hematoma appeared to track up through the anatomic tunnel/tract for the bypass.  As such the distal thigh (proximal anastomotic site) incision was reopened. Additional hematoma evacuated. Dissection was carried down to the proximal anastomotic site. There was evidence of active ooze from the toe of the anastomosis. This was controlled with a pledgeted 6-0 prolene suture. Additional topical hemostatic agent to include thrombin/gelfoam as well floseal employed with good hemostasis.     -The bypass was patent and easily palpable at conclusion of case.     -The distal thigh incision was re approximated with interrupted vertical mattress 2-0 nylon suture.      -The below knee pop incision site was partially re approximated with nylon suture.  -The mid portion was left open due to concern of excessive tension and VAC.  -The wound dimension left open measured approximately 5cm x 3cm x 3cm            The following portions of the patient's history were reviewed and updated as appropriate: allergies, current medications, past family history, past medical history, past social history, past surgical history, and problem list.    Review of Systems   Constitutional: Negative.    HENT: Negative.     Eyes: Negative.    Respiratory: Negative.     Cardiovascular:  Positive for leg swelling.   Gastrointestinal: Negative.    Endocrine: Negative.    Genitourinary: Negative.    Musculoskeletal:  Positive for gait problem.   Skin:  Positive for color change.   Allergic/Immunologic: Negative.    Neurological:  Negative for dizziness and numbness.   Hematological: Negative.    Psychiatric/Behavioral: Negative.           Objective:      /72 (BP Location: Left arm, Patient Position: Sitting)   Pulse 77   Resp 18   Ht 5' 11\" (1.803 m)   SpO2 98%   BMI 30.96 kg/m²          Physical Exam            I have reviewed and made appropriate changes to the review of systems input by the medical " "assistant.    Vitals:    02/09/24 0920   BP: 108/72   BP Location: Left arm   Patient Position: Sitting   Pulse: 77   Resp: 18   SpO2: 98%   Height: 5' 11\" (1.803 m)       Patient Active Problem List   Diagnosis   • Essential hypertension   • Leg swelling   • Gout of left foot   • Primary osteoarthritis of left knee   • Acute deep vein thrombosis (DVT) of tibial vein of right lower extremity (HCC)   • Popliteal artery aneurysm, bilateral (HCC)   • S/P femoral-popliteal bypass surgery       Past Surgical History:   Procedure Laterality Date   • CLOSURE WOUND Left 1/19/2024    Procedure: CLOSURE LEFT LOWER EXTREMITY WOUND, WASHOUT;  Surgeon: Parker Valdez MD;  [facility];  Service: Vascular   • EVACUATION OF HEMATOMA Left 1/17/2024    Procedure: EVACUATION/ DRAINAGE HEMATOMA, EXPLORATION ON LEFT LEG BYPASS, CONTROLL OF BLEEDING;  Surgeon: Mariana Vargas DO;  [facility];  Service: Vascular   • KNEE SURGERY Bilateral    • VA BYPASS W/VEIN FEMORAL-POPLITEAL Left 1/16/2024    Procedure: BYPASS FEMORAL-POPLITEAL -Left above knee popliteal to below knee popliteal artery bypass WITH REVERSE SAPHENOUS VEIN GRAFT; EXCLUSION OF POPLITEAL ANEURYSM;  Surgeon: Parker Valdez MD;  [facility];  Service: Vascular       Family History   Problem Relation Age of Onset   • Hypertension Mother    • No Known Problems Father        Social History     Socioeconomic History   • Marital status: /Civil Union     Spouse name: Not on file   • Number of children: Not on file   • Years of education: Not on file   • Highest education level: Not on file   Occupational History   • Not on file   Tobacco Use   • Smoking status: Every Day     Current packs/day: 0.25     Average packs/day: 0.3 packs/day for 32.0 years (8.0 ttl pk-yrs)     Types: Cigarettes   • Smokeless tobacco: Never   • Tobacco comments:     1 PPD x 15 years - As per [employer]    Vaping Use   • Vaping status: Never Used "   Substance and Sexual Activity   • Alcohol use: Yes     Comment: rare social use one time per month   • Drug use: Never   • Sexual activity: Yes     Partners: Female   Other Topics Concern   • Not on file   Social History Narrative    Current some day smoker - As per [employer]         · Most recent tobacco use screening:   10-      · Do you currently or have you served in the [employer]:   No     As per [place]      Social Determinants of Health     Financial Resource Strain: Not on file   Food Insecurity: No Food Insecurity (1/17/2024)    Hunger Vital Sign    • Worried About Running Out of Food in the Last Year: Never true    • Ran Out of Food in the Last Year: Never true   Transportation Needs: No Transportation Needs (1/17/2024)    PRAPARE - Transportation    • Lack of Transportation (Medical): No    • Lack of Transportation (Non-Medical): No   Physical Activity: Not on file   Stress: Not on file   Social Connections: Not on file   Intimate Partner Violence: Not on file   Housing Stability: Low Risk  (1/17/2024)    Housing Stability Vital Sign    • Unable to Pay for Housing in the Last Year: No    • Number of Places Lived in the Last Year: 1    • Unstable Housing in the Last Year: No       No Known Allergies      Current Outpatient Medications:   •  acetaminophen (TYLENOL) 325 mg tablet, Take 3 tablets (975 mg total) by mouth every 8 (eight) hours, Disp: , Rfl:   •  aspirin 81 mg chewable tablet, Chew 1 tablet (81 mg total) daily, Disp: 30 tablet, Rfl: 3  •  calcium carbonate (TUMS) 500 mg chewable tablet, Chew 1 tablet (500 mg total) daily as needed for indigestion or heartburn, Disp: , Rfl:   •  Eliquis 5 MG, TAKE 1 TABLET BY MOUTH TWICE A DAY, Disp: 60 tablet, Rfl: 2  •  losartan (COZAAR) 100 MG tablet, TAKE 1 TABLET BY MOUTH EVERY DAY, Disp: 90 tablet, Rfl: 1  •  rosuvastatin (CRESTOR) 10 MG tablet, TAKE 1 TABLET BY MOUTH EVERY DAY, Disp: 90 tablet, Rfl: 1  •  ascorbic acid (VITAMIN C) 250 MG  CHEW, Chew 250 mg daily Taking 2 daily (Patient not taking: Reported on 11/20/2023), Disp: , Rfl:   •  Cholecalciferol (Vitamin D) 50 MCG (2000 UT) tablet, Take 2,000 Units by mouth daily (Patient not taking: Reported on 10/30/2023), Disp: , Rfl:

## 2024-02-09 NOTE — PROGRESS NOTES
Assessment/Plan:    S/P LEFT femoral-popliteal bypass rSVG ([facility], 1/16/24)    S/P EVACUATION/ DRAINAGE HEMATOMA. EXPLORATION ON LEFT LEG BYPASS. CONTROLL OF BLEEDING (1/17/24)    Popliteal artery aneurysm, bilateral (HCC)    Post-op leg swelling      -Post-op visit; doing well; offers no concerns  -Continues to increase activity  -L LE edema during the day as expected, but less so and improves overnight  -Incisions continue to heal; tiny drainage from distal calf incision  -No leg pain, claudication; no fevers        L LE  -1+ LE and ankle edema  -Calf is soft  -1-2+ DP pulse present  -Incisions are still healing with scabs throughout; few tiny areas of separation  -Incisional flat redness; no   -Skin over the thigh is very dry, but no incisional breakdown, induration or evidence of infection.     -Exam: LLE s-Popliteal, s-PT and 1-2+ pulse in DP. L foot warm and well-perfused.     A/P Overall stable POD #23 after L leg surgery.  L fem-pop bypass is clinically patent. L foot 1-2+ DP pulses. There have been superficial dehiscence of the incisions which are not yet healed but slowing improving and scabbing over. No evidence of infection.    -Progressing post-op and exam as expected  -L LE incisions still healing; continue local care for LLE  -Wash soap and water; no bathing; ACE/gentle Tubigrip, elevation  -Slowing increase activity/ walking  -Smoking cessation  -Continue with aspirin, Eliquis 5 BID and rosuvastatin 10  -Follow up in 2-3 weeks or sooner, if needed  -Already scheduled for new baseline vascular studies in May    L leg care:  -Continue to wash with gentle soap and water  -No submerging in tubs or bathing  -May apply Betadine over scabbed incisional areas  -Activity with walking as tolerated  -Raise leg periodically during the day to help with edema  -Ace wrap and gentle Tubigrip may be applied also to help with swelling        CC: Dr. Valdez    Subjective:      Patient ID: Shilo Olmos is a  58 y.o. male.  Patient presents to the office for an incision site re-check. Pt is s/p Lt Fem-Pop bypass rSCG by Dr. Valdez on 1/16/24; s/p evacuation/ drainage hematoma, exploration on Lt leg bypass control of bleeding 1/17/24 and closure LLE wound, washout 1/19/24. Pt states that his leg is doing better. Pt has mild discharge from the incision sites, and states the discharge is lessening. Pt has increased his walking.     HPI   Mr. Olmos 58 yoM Htn, bilateral popliteal aneurysms s/p L above-knee to below-knee popliteal artery bypass with reverse greater saphenous vein on 1/16/24. On POD #1, he developed hematoma and required exploration of the bypass. Moderate amount of hematoma evacuated from the below knee popliteal exposure site. Further, additional re-opening of the incision was required due to hematoma track with treatment of anastomotic ooze controlled with 6-0 prolene and topical agent.     2/9/24: POD #23. Patient reports that he is doing well.  He offers no complaints.  He is increasing activity.  He is still having leg swelling which is worse as the day goes on but mostly resolved overnight when he wakes up.  No problems with incisions which are still healing.  Trace serosanguineous drainage from the calf which she is much improved.  Most of the sutures/staples have been removed previously.  He still has some sutures in the calf.  2 out of the 4 sutures were removed but since he still has swelling to wear left in place.   unless he has problems he needs to be seen sooner, we will plan to see him back in 2 to 3 weeks for recheck.    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  Since he still has incisions that are healing and moderate leg edema, recommend holding off on returning to work at this time.  We can continue to reassess this in the future.  He is only 3 weeks postop and may need a few more weeks before he is back to all activities.  He apparently also rides  "a motorcycle which advised again while he is on apixaban.  He is hoping at the 3 or 6-month leilani he might be able to be taken off of anticoagulation which I will defer to Dr. Valdez.    Medical therapy includes apixaban 5 twice daily,, aspirin 81, rosuvastatin 10, nicotine patch, etc.         The following portions of the patient's history were reviewed and updated as appropriate: allergies, current medications, past family history, past medical history, past social history, past surgical history, and problem list.    Review of Systems   Constitutional: Negative.    HENT: Negative.     Eyes: Negative.    Respiratory: Negative.     Cardiovascular:  Positive for leg swelling.   Gastrointestinal: Negative.    Endocrine: Negative.    Genitourinary: Negative.    Musculoskeletal:  Positive for gait problem.   Skin:  Positive for color change.   Allergic/Immunologic: Negative.    Neurological:  Negative for dizziness and numbness.   Hematological: Negative.    Psychiatric/Behavioral: Negative.           Objective:      /72 (BP Location: Left arm, Patient Position: Sitting)   Pulse 77   Resp 18   Ht 5' 11\" (1.803 m)   SpO2 98%   BMI 30.96 kg/m²       Incisions    No easily palp pulses in the right    L s-Popliteal; 1-*2+p-DP, s-PT       Physical Exam  Vitals and nursing note reviewed.   Constitutional:       Appearance: He is well-developed.   HENT:      Head: Normocephalic and atraumatic.   Eyes:      Pupils: Pupils are equal, round, and reactive to light.   Cardiovascular:      Rate and Rhythm: Normal rate and regular rhythm.      Pulses:           Carotid pulses are 2+ on the right side and 2+ on the left side.       Radial pulses are 2+ on the right side and 2+ on the left side.        Dorsalis pedis pulses are 1+ on the left side.      Heart sounds: Normal heart sounds, S1 normal and S2 normal. No murmur heard.     No friction rub. No gallop.   Pulmonary:      Effort: Pulmonary effort is normal. No " "accessory muscle usage or respiratory distress.      Breath sounds: Normal breath sounds. No wheezing or rales.   Abdominal:      General: Bowel sounds are normal. There is no distension.      Palpations: Abdomen is soft.      Tenderness: There is no abdominal tenderness.   Musculoskeletal:         General: No deformity. Normal range of motion.   Skin:     General: Skin is warm and dry.      Findings: No lesion or rash.      Nails: There is no clubbing.   Neurological:      Mental Status: He is alert and oriented to person, place, and time.      Comments: Grossly normal    Psychiatric:         Behavior: Behavior is cooperative.           I have reviewed and made appropriate changes to the review of systems input by the medical assistant.    Vitals:    02/09/24 0920   BP: 108/72   BP Location: Left arm   Patient Position: Sitting   Pulse: 77   Resp: 18   SpO2: 98%   Height: 5' 11\" (1.803 m)       Patient Active Problem List   Diagnosis    Essential hypertension    Leg swelling    Gout of left foot    Primary osteoarthritis of left knee    Acute deep vein thrombosis (DVT) of tibial vein of right lower extremity (HCC)    Popliteal artery aneurysm, bilateral ([facility])    S/P femoral-popliteal bypass surgery       Past Surgical History:   Procedure Laterality Date    CLOSURE WOUND Left 1/19/2024    Procedure: CLOSURE LEFT LOWER EXTREMITY WOUND, WASHOUT;  Surgeon: Parker Valdez MD;  [facility];  Service: Vascular    EVACUATION OF HEMATOMA Left 1/17/2024    Procedure: EVACUATION/ DRAINAGE HEMATOMA, EXPLORATION ON LEFT LEG BYPASS, CONTROLL OF BLEEDING;  Surgeon: Mariana Vargas DO;  [facility];  Service: Vascular    KNEE SURGERY Bilateral     NV BYPASS W/VEIN FEMORAL-POPLITEAL Left 1/16/2024    Procedure: BYPASS FEMORAL-POPLITEAL -Left above knee popliteal to below knee popliteal artery bypass WITH REVERSE SAPHENOUS VEIN GRAFT; EXCLUSION OF POPLITEAL ANEURYSM;  Surgeon: Parker Driver " MD [facility];  [facility];  Service: Vascular       Family History   Problem Relation Age of Onset    Hypertension Mother     No Known Problems Father        Social History     Socioeconomic History    Marital status: /Civil Union     Spouse name: Not on file    Number of children: Not on file    Years of education: Not on file    Highest education level: Not on file   Occupational History    Not on file   Tobacco Use    Smoking status: Every Day     Current packs/day: 0.25     Average packs/day: 0.3 packs/day for 32.0 years (8.0 ttl pk-yrs)     Types: Cigarettes    Smokeless tobacco: Never    Tobacco comments:     1 PPD x 15 years - As per [facility]    Vaping Use    Vaping status: Never Used   Substance and Sexual Activity    Alcohol use: Yes     Comment: rare social use one time per month    Drug use: Never    Sexual activity: Yes     Partners: Female   Other Topics Concern    Not on file   Social History Narrative    Current some day smoker - As per [employer]         · Most recent tobacco use screening:   10-      · Do you currently or have you served in the [employer]:   No     As per [place]      Social Determinants of Health     Financial Resource Strain: Not on file   Food Insecurity: No Food Insecurity (1/17/2024)    Hunger Vital Sign     Worried About Running Out of Food in the Last Year: Never true     Ran Out of Food in the Last Year: Never true   Transportation Needs: No Transportation Needs (1/17/2024)    PRAPARE - Transportation     Lack of Transportation (Medical): No     Lack of Transportation (Non-Medical): No   Physical Activity: Not on file   Stress: Not on file   Social Connections: Not on file   Intimate Partner Violence: Not on file   Housing Stability: Low Risk  (1/17/2024)    Housing Stability Vital Sign     Unable to Pay for Housing in the Last Year: No     Number of Places Lived in the Last Year: 1     Unstable Housing in the Last Year: No       No Known  Allergies      Current Outpatient Medications:     acetaminophen (TYLENOL) 325 mg tablet, Take 3 tablets (975 mg total) by mouth every 8 (eight) hours, Disp: , Rfl:     aspirin 81 mg chewable tablet, Chew 1 tablet (81 mg total) daily, Disp: 30 tablet, Rfl: 3    calcium carbonate (TUMS) 500 mg chewable tablet, Chew 1 tablet (500 mg total) daily as needed for indigestion or heartburn, Disp: , Rfl:     Eliquis 5 MG, TAKE 1 TABLET BY MOUTH TWICE A DAY, Disp: 60 tablet, Rfl: 2    losartan (COZAAR) 100 MG tablet, TAKE 1 TABLET BY MOUTH EVERY DAY, Disp: 90 tablet, Rfl: 1    rosuvastatin (CRESTOR) 10 MG tablet, TAKE 1 TABLET BY MOUTH EVERY DAY, Disp: 90 tablet, Rfl: 1    ascorbic acid (VITAMIN C) 250 MG CHEW, Chew 250 mg daily Taking 2 daily (Patient not taking: Reported on 11/20/2023), Disp: , Rfl:     Cholecalciferol (Vitamin D) 50 MCG (2000 UT) tablet, Take 2,000 Units by mouth daily (Patient not taking: Reported on 10/30/2023), Disp: , Rfl:

## 2024-02-09 NOTE — LETTER
February 9, 2024     Parker Valdez MD  [address]  [address]  [facility] [address]    Patient: Shilo Olmos   YOB: 1965   Date of Visit: 2/9/2024       Dear Dr. Valdez:    Thank you for referring Shilo Olmos to me for evaluation. Below are my notes for this consultation.    If you have questions, please do not hesitate to call me. I look forward to following your patient along with you.         Sincerely,        Jaimie Duff PA-C        CC: No Recipients    Jaimie Duff PA-C  2/9/2024  4:38 PM  Sign when Signing Visit  Assessment/Plan:    S/P LEFT femoral-popliteal bypass rSVG ([facility], 1/16/24)    S/P EVACUATION/ DRAINAGE HEMATOMA. EXPLORATION ON LEFT LEG BYPASS. CONTROLL OF BLEEDING (1/17/24)    Popliteal artery aneurysm, bilateral (HCC)    Post-op leg swelling      -Post-op visit; doing well; offers no concerns  -Continues to increase activity  -L LE edema during the day as expected, but less so and improves overnight  -Incisions continue to heal; tiny drainage from distal calf incision  -No leg pain, claudication; no fevers        L LE  -1+ LE and ankle edema  -Calf is soft  -1-2+ DP pulse present  -Incisions are still healing with scabs throughout; few tiny areas of separation  -Incisional flat redness; no   -Skin over the thigh is very dry, but no incisional breakdown, induration or evidence of infection.     -Exam: LLE s-Popliteal, s-PT and 1-2+ pulse in DP. L foot warm and well-perfused.     A/P Overall stable POD #23 after L leg surgery.  L fem-pop bypass is clinically patent. L foot 1-2+ DP pulses. There have been superficial dehiscence of the incisions which are not yet healed but slowing improving and scabbing over. No evidence of infection.    -Progressing post-op and exam as expected  -L LE incisions still healing; continue local care for LLE  -Wash soap and water; no bathing; ACE/gentle Tubigrip, elevation  -Slowing increase activity/ walking  -Smoking  cessation  -Continue with aspirin, Eliquis 5 BID and rosuvastatin 10  -Follow up in 2-3 weeks or sooner, if needed  -Already scheduled for new baseline vascular studies in May    L leg care:  -Continue to wash with gentle soap and water  -No submerging in tubs or bathing  -May apply Betadine over scabbed incisional areas  -Activity with walking as tolerated  -Raise leg periodically during the day to help with edema  -Ace wrap and gentle Tubigrip may be applied also to help with swelling        CC: Dr. Valdez    Subjective:      Patient ID: Shilo Olmos is a 58 y.o. male.  Patient presents to the office for an incision site re-check. Pt is s/p Lt Fem-Pop bypass rSCG by Dr. Valdez on 1/16/24; s/p evacuation/ drainage hematoma, exploration on Lt leg bypass control of bleeding 1/17/24 and closure LLE wound, washout 1/19/24. Pt states that his leg is doing better. Pt has mild discharge from the incision sites, and states the discharge is lessening. Pt has increased his walking.     HPI   Mr. Olmos 58 yoM Htn, bilateral popliteal aneurysms s/p L above-knee to below-knee popliteal artery bypass with reverse greater saphenous vein on 1/16/24. On POD #1, he developed hematoma and required exploration of the bypass. Moderate amount of hematoma evacuated from the below knee popliteal exposure site. Further, additional re-opening of the incision was required due to hematoma track with treatment of anastomotic ooze controlled with 6-0 prolene and topical agent.     2/9/24: POD #23. Patient reports that he is doing well.  He offers no complaints.  He is increasing activity.  He is still having leg swelling which is worse as the day goes on but mostly resolved overnight when he wakes up.  No problems with incisions which are still healing.  Trace serosanguineous drainage from the calf which she is much improved.  Most of the sutures/staples have been removed previously.  He still has some sutures in the calf.  2 out of  "the 4 sutures were removed but since he still has swelling to wear left in place.   unless he has problems he needs to be seen sooner, we will plan to see him back in 2 to 3 weeks for recheck.    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  Since he still has incisions that are healing and moderate leg edema, recommend holding off on returning to work at this time.  We can continue to reassess this in the future.  He is only 3 weeks postop and may need a few more weeks before he is back to all activities.  He apparently also rides a motorcycle which advised again while he is on apixaban.  He is hoping at the 3 or 6-month leilani he might be able to be taken off of anticoagulation which I will defer to Dr. Valdez.    Medical therapy includes apixaban 5 twice daily,, aspirin 81, rosuvastatin 10, nicotine patch, etc.         The following portions of the patient's history were reviewed and updated as appropriate: allergies, current medications, past family history, past medical history, past social history, past surgical history, and problem list.    Review of Systems   Constitutional: Negative.    HENT: Negative.     Eyes: Negative.    Respiratory: Negative.     Cardiovascular:  Positive for leg swelling.   Gastrointestinal: Negative.    Endocrine: Negative.    Genitourinary: Negative.    Musculoskeletal:  Positive for gait problem.   Skin:  Positive for color change.   Allergic/Immunologic: Negative.    Neurological:  Negative for dizziness and numbness.   Hematological: Negative.    Psychiatric/Behavioral: Negative.           Objective:      /72 (BP Location: Left arm, Patient Position: Sitting)   Pulse 77   Resp 18   Ht 5' 11\" (1.803 m)   SpO2 98%   BMI 30.96 kg/m²       Incisions    No easily palp pulses in the right    L s-Popliteal; 1-*2+p-DP, s-PT       Physical Exam  Vitals and nursing note reviewed.   Constitutional:       Appearance: He is well-developed.   HENT: " "     Head: Normocephalic and atraumatic.   Eyes:      Pupils: Pupils are equal, round, and reactive to light.   Cardiovascular:      Rate and Rhythm: Normal rate and regular rhythm.      Pulses:           Carotid pulses are 2+ on the right side and 2+ on the left side.       Radial pulses are 2+ on the right side and 2+ on the left side.        Dorsalis pedis pulses are 1+ on the left side.      Heart sounds: Normal heart sounds, S1 normal and S2 normal. No murmur heard.     No friction rub. No gallop.   Pulmonary:      Effort: Pulmonary effort is normal. No accessory muscle usage or respiratory distress.      Breath sounds: Normal breath sounds. No wheezing or rales.   Abdominal:      General: Bowel sounds are normal. There is no distension.      Palpations: Abdomen is soft.      Tenderness: There is no abdominal tenderness.   Musculoskeletal:         General: No deformity. Normal range of motion.   Skin:     General: Skin is warm and dry.      Findings: No lesion or rash.      Nails: There is no clubbing.   Neurological:      Mental Status: He is alert and oriented to person, place, and time.      Comments: Grossly normal    Psychiatric:         Behavior: Behavior is cooperative.           I have reviewed and made appropriate changes to the review of systems input by the medical assistant.    Vitals:    02/09/24 0920   BP: 108/72   BP Location: Left arm   Patient Position: Sitting   Pulse: 77   Resp: 18   SpO2: 98%   Height: 5' 11\" (1.803 m)       Patient Active Problem List   Diagnosis   • Essential hypertension   • Leg swelling   • Gout of left foot   • Primary osteoarthritis of left knee   • Acute deep vein thrombosis (DVT) of tibial vein of right lower extremity (HCC)   • Popliteal artery aneurysm, bilateral (HCC)   • S/P femoral-popliteal bypass surgery       Past Surgical History:   Procedure Laterality Date   • CLOSURE WOUND Left 1/19/2024    Procedure: CLOSURE LEFT LOWER EXTREMITY WOUND, WASHOUT;  Surgeon: " Parker Valdez MD;  [facility];  Service: Vascular   • EVACUATION OF HEMATOMA Left 1/17/2024    Procedure: EVACUATION/ DRAINAGE HEMATOMA, EXPLORATION ON LEFT LEG BYPASS, CONTROLL OF BLEEDING;  Surgeon: Mariana Vargas DO;  [facility];  Service: Vascular   • KNEE SURGERY Bilateral    • TN BYPASS W/VEIN FEMORAL-POPLITEAL Left 1/16/2024    Procedure: BYPASS FEMORAL-POPLITEAL -Left above knee popliteal to below knee popliteal artery bypass WITH REVERSE SAPHENOUS VEIN GRAFT; EXCLUSION OF POPLITEAL ANEURYSM;  Surgeon: Parker Valdez MD;  [facility];  Service: Vascular       Family History   Problem Relation Age of Onset   • Hypertension Mother    • No Known Problems Father        Social History     Socioeconomic History   • Marital status: /Civil Union     Spouse name: Not on file   • Number of children: Not on file   • Years of education: Not on file   • Highest education level: Not on file   Occupational History   • Not on file   Tobacco Use   • Smoking status: Every Day     Current packs/day: 0.25     Average packs/day: 0.3 packs/day for 32.0 years (8.0 ttl pk-yrs)     Types: Cigarettes   • Smokeless tobacco: Never   • Tobacco comments:     1 PPD x 15 years - As per [facility]    Vaping Use   • Vaping status: Never Used   Substance and Sexual Activity   • Alcohol use: Yes     Comment: rare social use one time per month   • Drug use: Never   • Sexual activity: Yes     Partners: Female   Other Topics Concern   • Not on file   Social History Narrative    Current some day smoker - As per [employer]         · Most recent tobacco use screening:   10-      · Do you currently or have you served in the [employer]:   No     As per [facility]      Social Determinants of Health     Financial Resource Strain: Not on file   Food Insecurity: No Food Insecurity (1/17/2024)    Hunger Vital Sign    • Worried About Running Out of Food in the Last Year: Never true    •  Ran Out of Food in the Last Year: Never true   Transportation Needs: No Transportation Needs (1/17/2024)    PRAPARE - Transportation    • Lack of Transportation (Medical): No    • Lack of Transportation (Non-Medical): No   Physical Activity: Not on file   Stress: Not on file   Social Connections: Not on file   Intimate Partner Violence: Not on file   Housing Stability: Low Risk  (1/17/2024)    Housing Stability Vital Sign    • Unable to Pay for Housing in the Last Year: No    • Number of Places Lived in the Last Year: 1    • Unstable Housing in the Last Year: No       No Known Allergies      Current Outpatient Medications:   •  acetaminophen (TYLENOL) 325 mg tablet, Take 3 tablets (975 mg total) by mouth every 8 (eight) hours, Disp: , Rfl:   •  aspirin 81 mg chewable tablet, Chew 1 tablet (81 mg total) daily, Disp: 30 tablet, Rfl: 3  •  calcium carbonate (TUMS) 500 mg chewable tablet, Chew 1 tablet (500 mg total) daily as needed for indigestion or heartburn, Disp: , Rfl:   •  Eliquis 5 MG, TAKE 1 TABLET BY MOUTH TWICE A DAY, Disp: 60 tablet, Rfl: 2  •  losartan (COZAAR) 100 MG tablet, TAKE 1 TABLET BY MOUTH EVERY DAY, Disp: 90 tablet, Rfl: 1  •  rosuvastatin (CRESTOR) 10 MG tablet, TAKE 1 TABLET BY MOUTH EVERY DAY, Disp: 90 tablet, Rfl: 1  •  ascorbic acid (VITAMIN C) 250 MG CHEW, Chew 250 mg daily Taking 2 daily (Patient not taking: Reported on 11/20/2023), Disp: , Rfl:   •  Cholecalciferol (Vitamin D) 50 MCG (2000 UT) tablet, Take 2,000 Units by mouth daily (Patient not taking: Reported on 10/30/2023), Disp: , Rfl:

## 2024-02-26 NOTE — PATIENT INSTRUCTIONS
S/P LEFT femoral-popliteal bypass rSVG ([facility], 1/16/24)      Doing well after bypass  Continue with aspirin, Eliquis and atorvastatin  Duplex in 2 months with office visit    L leg care:  -Continue to wash with gentle soap and water  -Activity with walking as tolerated  -Raise leg periodically during the day to help with edema  -Recommend medical compression stockings or Tubigrip

## 2024-02-27 NOTE — PROGRESS NOTES
Assessment/Plan:    S/P LEFT femoral-popliteal bypass rSVG (Courtney, 1/16/24)     S/P EVACUATION/ DRAINAGE HEMATOMA. EXPLORATION ON LEFT LEG BYPASS. CONTROLL OF BLEEDING (1/17/24)     Popliteal artery aneurysm, bilateral (HCC)     Post-op leg swelling   -     Compression Stocking      RE:  Final suture removal  -Doing well. Mild L leg swelling which is improved.   -Some thigh/ calf aching after standing too long. Some numbness in the calf - unchanged.   -No exertional leg pain. No foot pain.  Incisions healed.   -Would like to return to work as  next week.        LLE  -Left lower extremity with mild to moderate LE and pedal edema (improved)  -Incisions x 4 are healed and closed  -Remaining calf sutures were removed and replaced with Steri-Strips  -2+ DP pulse.  Foot is warm well-perfused.    A/P: Stable 6 weeks after L Fem-Pop bypass.  Mild leg edema for which gentle compression or Tubigrip is recommended, as well as periodic elevation and low-sodium diet.  Incisions are healed and closed. Otherwise no acute concerns.    -Doing well 6 weeks postop after left femoral -popliteal bypass  -Treatment of leg swelling, otherwise no acute issues  -Continue with conservative measures for leg swelling  -Increase activity as tolerated  -Smoking cessation  -Continue with aspirin, Eliquis 5 BID and rosuvastatin 10  -Already scheduled for new baseline vascular studies in May  -Will arrange for follow up with Dr. Valdez after BRAD  -Call sooner if any questions or concerns  -Ok to return to work next week    Subjective:      Patient ID: Shilo Olmos is a 58 y.o. male.  Patient presents today for f/u visit to re-assess surgical site healing and leg swelling s/p L femoral-popliteal bypass rSVG 1/16/24 (Courtney) f/b evacuation/drainage hematoma, exploration of L leg bypass 1/17/24 (Alicia).       HPI  Mr. Olmos 58 yoM Htn, bilateral popliteal aneurysms s/p L above-knee to below-knee popliteal artery bypass  with reverse greater saphenous vein on 1/16/24. On POD #1, he developed hematoma and required exploration of the bypass. Moderate amount of hematoma evacuated from the below knee popliteal exposure site. Further, additional re-opening of the incision was required due to hematoma track with treatment of anastomotic ooze controlled with 6-0 prolene and topical agent. Medical therapy includes apixaban 5 twice daily,, aspirin 81, rosuvastatin 10, nicotine patch, etc.     2/9/24: POD #23. Patient reports that he is doing well.  He offers no complaints.  He is increasing activity.  He is still having leg swelling which is worse as the day goes on but mostly resolved overnight when he wakes up.  No problems with incisions which are still healing.  Trace serosanguineous drainage from the calf which she is much improved.  Most of the sutures/staples have been removed previously.  He still has some sutures in the calf.  2 out of the 4 sutures were removed but since he still has swelling to wear left in place.   unless he has problems he needs to be seen sooner, we will plan to see him back in 2 to 3 weeks for recheck.     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  Since he still has incisions that are healing and moderate leg edema, recommend holding off on returning to work at this time.  We can continue to reassess this in the future.  He is only 3 weeks postop and may need a few more weeks before he is back to all activities.  He apparently also rides a motorcycle which advised again while he is on apixaban.  He is hoping at the 3 or 6-month leilani he might be able to be taken off of anticoagulation which I will defer to Dr. Valdez.     2/28/24:  POD#43 after L fem-pop bypass.  Patient is doing well.  His biggest concern is that he needs to get back to work.  He feels he needs to return to work by next week.  Leg swelling is much improved.  He is walking well and without disability.   "After standing for period of time he may get some thigh or calf discomfort.  He continues to have some numbness in the lower leg since the surgery which is unchanged. He is encouraged to wear his stocking particularly when he gets back to work since he is a  and will be sitting in a truck.  All incisions are healed and closed. Patient confirms that he is taking all medications as directed including aspirin, apixaban and rosuvastatin.       The following portions of the patient's history were reviewed and updated as appropriate: allergies, current medications, past family history, past medical history, past social history, past surgical history, and problem list.    Review of Systems   Constitutional: Negative.    HENT: Negative.     Eyes: Negative.    Respiratory: Negative.     Cardiovascular:  Positive for leg swelling.   Gastrointestinal: Negative.    Endocrine: Negative.    Genitourinary: Negative.    Musculoskeletal: Negative.    Skin: Negative.    Allergic/Immunologic: Negative.    Neurological: Negative.    Hematological: Negative.    Psychiatric/Behavioral: Negative.           Objective:    /80 (BP Location: Left arm, Patient Position: Sitting)   Pulse 78   Ht 5' 11\" (1.803 m)   Wt 101 kg (223 lb)   BMI 31.10 kg/m²      Physical Exam  Vitals and nursing note reviewed.   Constitutional:       Appearance: He is well-developed.   HENT:      Head: Normocephalic and atraumatic.   Eyes:      Pupils: Pupils are equal, round, and reactive to light.   Cardiovascular:      Rate and Rhythm: Normal rate and regular rhythm.      Pulses:           Radial pulses are 2+ on the right side and 2+ on the left side.        Dorsalis pedis pulses are 0 on the right side and 2+ on the left side.      Heart sounds: Normal heart sounds, S1 normal and S2 normal. No murmur heard.     No friction rub. No gallop.   Pulmonary:      Effort: Pulmonary effort is normal. No accessory muscle usage or respiratory distress.      " "Breath sounds: Normal breath sounds. No wheezing or rales.   Abdominal:      General: Bowel sounds are normal. There is no distension.      Palpations: Abdomen is soft.      Tenderness: There is no abdominal tenderness.   Musculoskeletal:         General: No deformity. Normal range of motion.      Cervical back: Neck supple.      Left lower le+ Edema present.   Skin:     General: Skin is warm and dry.      Findings: No lesion or rash.      Nails: There is no clubbing.   Neurological:      Mental Status: He is alert and oriented to person, place, and time.      Comments: Grossly normal    Psychiatric:         Behavior: Behavior is cooperative.             I have reviewed and made appropriate changes to the review of systems input by the medical assistant.    Vitals:    24 0829   BP: 116/80   BP Location: Left arm   Patient Position: Sitting   Pulse: 78   Weight: 101 kg (223 lb)   Height: 5' 11\" (1.803 m)       Patient Active Problem List   Diagnosis    Essential hypertension    Leg swelling    Gout of left foot    Primary osteoarthritis of left knee    Acute deep vein thrombosis (DVT) of tibial vein of right lower extremity (HCC)    Popliteal artery aneurysm, bilateral (HCC)    S/P femoral-popliteal bypass surgery       Past Surgical History:   Procedure Laterality Date    CLOSURE WOUND Left 2024    Procedure: CLOSURE LEFT LOWER EXTREMITY WOUND, WASHOUT;  Surgeon: Parker Valdez MD;  Location: [facility];  Service: Vascular    EVACUATION OF HEMATOMA Left 2024    Procedure: EVACUATION/ DRAINAGE HEMATOMA, EXPLORATION ON LEFT LEG BYPASS, CONTROLL OF BLEEDING;  Surgeon: Mariana Vargas DO;  Location: [facility];  Service: Vascular    KNEE SURGERY Bilateral     FL BYPASS W/VEIN FEMORAL-POPLITEAL Left 2024    Procedure: BYPASS FEMORAL-POPLITEAL -Left above knee popliteal to below knee popliteal artery bypass WITH REVERSE SAPHENOUS VEIN GRAFT; EXCLUSION OF POPLITEAL ANEURYSM;  " Surgeon: Parker Valdez MD;  Location: [facility];  Service: Vascular       Family History   Problem Relation Age of Onset    Hypertension Mother     No Known Problems Father        Social History     Socioeconomic History    Marital status: /Civil Union     Spouse name: Not on file    Number of children: Not on file    Years of education: Not on file    Highest education level: Not on file   Occupational History    Not on file   Tobacco Use    Smoking status: Former     Current packs/day: 0.25     Average packs/day: 0.3 packs/day for 32.0 years (8.0 ttl pk-yrs)     Types: Cigarettes    Smokeless tobacco: Never    Tobacco comments:     1 PPD x 15 years - As per [employer]    Vaping Use    Vaping status: Never Used   Substance and Sexual Activity    Alcohol use: Yes     Comment: rare social use one time per month    Drug use: Never    Sexual activity: Yes     Partners: Female   Other Topics Concern    Not on file   Social History Narrative    Current some day smoker - As per [employer]         · Most recent tobacco use screening:   10-      · Do you currently or have you served in the [employer]:   No     As per [employer]      Social Determinants of Health     Financial Resource Strain: Not on file   Food Insecurity: No Food Insecurity (1/17/2024)    Hunger Vital Sign     Worried About Running Out of Food in the Last Year: Never true     Ran Out of Food in the Last Year: Never true   Transportation Needs: No Transportation Needs (1/17/2024)    PRAPARE - Transportation     Lack of Transportation (Medical): No     Lack of Transportation (Non-Medical): No   Physical Activity: Not on file   Stress: Not on file   Social Connections: Not on file   Intimate Partner Violence: Not on file   Housing Stability: Low Risk  (1/17/2024)    Housing Stability Vital Sign     Unable to Pay for Housing in the Last Year: No     Number of Places Lived in the Last Year: 1     Unstable Housing in the Last Year: No        No Known Allergies      Current Outpatient Medications:     acetaminophen (TYLENOL) 325 mg tablet, Take 3 tablets (975 mg total) by mouth every 8 (eight) hours, Disp: , Rfl:     aspirin 81 mg chewable tablet, Chew 1 tablet (81 mg total) daily, Disp: 30 tablet, Rfl: 3    calcium carbonate (TUMS) 500 mg chewable tablet, Chew 1 tablet (500 mg total) daily as needed for indigestion or heartburn, Disp: , Rfl:     Eliquis 5 MG, TAKE 1 TABLET BY MOUTH TWICE A DAY, Disp: 60 tablet, Rfl: 2    losartan (COZAAR) 100 MG tablet, TAKE 1 TABLET BY MOUTH EVERY DAY, Disp: 90 tablet, Rfl: 1    rosuvastatin (CRESTOR) 10 MG tablet, TAKE 1 TABLET BY MOUTH EVERY DAY, Disp: 90 tablet, Rfl: 1    ascorbic acid (VITAMIN C) 250 MG CHEW, Chew 250 mg daily Taking 2 daily (Patient not taking: Reported on 11/20/2023), Disp: , Rfl:     Cholecalciferol (Vitamin D) 50 MCG (2000 UT) tablet, Take 2,000 Units by mouth daily (Patient not taking: Reported on 10/30/2023), Disp: , Rfl:

## 2024-02-28 ENCOUNTER — OFFICE VISIT (OUTPATIENT)
Dept: VASCULAR SURGERY | Facility: CLINIC | Age: 59
End: 2024-02-28

## 2024-02-28 VITALS
DIASTOLIC BLOOD PRESSURE: 80 MMHG | SYSTOLIC BLOOD PRESSURE: 116 MMHG | HEIGHT: 71 IN | BODY MASS INDEX: 31.22 KG/M2 | HEART RATE: 78 BPM | WEIGHT: 223 LBS

## 2024-02-28 DIAGNOSIS — Z95.828 S/P FEMORAL-POPLITEAL BYPASS SURGERY: Primary | ICD-10-CM

## 2024-02-28 DIAGNOSIS — I72.4 POPLITEAL ARTERY ANEURYSM, BILATERAL (HCC): ICD-10-CM

## 2024-02-28 PROCEDURE — 99024 POSTOP FOLLOW-UP VISIT: CPT | Performed by: PHYSICIAN ASSISTANT

## 2024-02-28 NOTE — LETTER
February 28, 2024     Patient: Shilo Olmos  YOB: 1965  Date of Visit: 2/28/2024      To Whom it May Concern:    Shilo Olmos is under my professional care. Shilo was seen in my office on 2/28/2024. Shilo may return to work on March 4, 2024 .    If you have any questions or concerns, please don't hesitate to call.         Sincerely,          Jaimie Duff PA-C        CC: No Recipients

## 2024-03-20 ENCOUNTER — APPOINTMENT (OUTPATIENT)
Dept: URGENT CARE | Facility: MEDICAL CENTER | Age: 59
End: 2024-03-20

## 2024-03-29 DIAGNOSIS — E78.00 PURE HYPERCHOLESTEROLEMIA: ICD-10-CM

## 2024-03-29 RX ORDER — ROSUVASTATIN CALCIUM 10 MG/1
10 TABLET, COATED ORAL DAILY
Qty: 90 TABLET | Refills: 1 | Status: SHIPPED | OUTPATIENT
Start: 2024-03-29

## 2024-03-29 NOTE — TELEPHONE ENCOUNTER
Reason for call:   [x] Refill   [] Prior Auth  [] Other:     Office:   [x] PCP/Provider -   [] Specialty/Provider -     Medication:  rosuvastatin (CRESTOR) 10 MG tablet    Dose/Frequency: TAKE 1 TABLET BY MOUTH EVERY DAY,     Quantity: 90    Pharmacy: [facility]/pharmacy #[id number]  [facility] - [address] [phone]    Does the patient have enough for 3 days?   [] Yes   [x] No - Send as HP to POD

## 2024-04-26 ENCOUNTER — HOSPITAL ENCOUNTER (OUTPATIENT)
Dept: VASCULAR ULTRASOUND | Facility: HOSPITAL | Age: 59
Discharge: HOME | End: 2024-04-26
Payer: COMMERCIAL

## 2024-04-26 ENCOUNTER — HOSPITAL ENCOUNTER (OUTPATIENT)
Dept: VASCULAR ULTRASOUND | Facility: HOSPITAL | Age: 59
Discharge: HOME | End: 2024-04-26
Attending: SURGERY
Payer: COMMERCIAL

## 2024-04-26 DIAGNOSIS — I72.4 POPLITEAL ARTERY ANEURYSM, BILATERAL (HCC): ICD-10-CM

## 2024-04-26 DIAGNOSIS — I71.43 INFRARENAL ABDOMINAL AORTIC ANEURYSM (AAA) WITHOUT RUPTURE (HCC): ICD-10-CM

## 2024-04-26 PROCEDURE — 93925 LOWER EXTREMITY STUDY: CPT

## 2024-04-26 PROCEDURE — 93978 VASCULAR STUDY: CPT

## 2024-04-26 PROCEDURE — 93923 UPR/LXTR ART STDY 3+ LVLS: CPT

## 2024-04-28 PROCEDURE — 93978 VASCULAR STUDY: CPT | Performed by: SURGERY

## 2024-04-28 PROCEDURE — 93925 LOWER EXTREMITY STUDY: CPT | Performed by: SURGERY

## 2024-04-28 PROCEDURE — 93922 UPR/L XTREMITY ART 2 LEVELS: CPT | Performed by: SURGERY

## 2024-05-09 ENCOUNTER — OFFICE VISIT (OUTPATIENT)
Dept: VASCULAR SURGERY | Facility: CLINIC | Age: 59
End: 2024-05-09
Payer: COMMERCIAL

## 2024-05-09 VITALS
SYSTOLIC BLOOD PRESSURE: 148 MMHG | HEIGHT: 71 IN | HEART RATE: 93 BPM | DIASTOLIC BLOOD PRESSURE: 84 MMHG | WEIGHT: 229 LBS | BODY MASS INDEX: 32.06 KG/M2

## 2024-05-09 DIAGNOSIS — I71.40 ABDOMINAL AORTIC ANEURYSM (AAA) 3.0 CM TO 5.5 CM IN DIAMETER IN MALE (HCC): ICD-10-CM

## 2024-05-09 DIAGNOSIS — I72.4 POPLITEAL ARTERY ANEURYSM, BILATERAL (HCC): Primary | ICD-10-CM

## 2024-05-09 DIAGNOSIS — I82.441 ACUTE DEEP VEIN THROMBOSIS (DVT) OF TIBIAL VEIN OF RIGHT LOWER EXTREMITY (HCC): ICD-10-CM

## 2024-05-09 DIAGNOSIS — I82.559 CHRONIC DEEP VEIN THROMBOSIS (DVT) OF PERONEAL VEIN, UNSPECIFIED LATERALITY (HCC): ICD-10-CM

## 2024-05-09 PROCEDURE — 99214 OFFICE O/P EST MOD 30 MIN: CPT | Performed by: SURGERY

## 2024-05-09 NOTE — PROGRESS NOTES
Assessment/Plan:    Abdominal aortic aneurysm (AAA) 3.0 cm to 5.5 cm in diameter in male (HCC)  4 cm infrarenal abdominal aortic aneurysm.  Discussed the stable trial as a potential option in his case.  At this time he has been through a lot in the last several months and would prefer to continue surveillance course at this time.  6-month aortoiliac duplex ordered.    Acute deep vein thrombosis (DVT) of tibial vein of right lower extremity (HCC)  Ongoing anticoagulation for lower extremity DVT in the setting of a patient who is a  and at risk for recurrent and embolic events associated with DVT.    Popliteal artery aneurysm, bilateral (HCC)  Occluded right popliteal artery aneurysm.  BEATRICE in the right of 0.84.  Left lower extremity with above-knee to below-knee popliteal artery bypass and exclusion of patent popliteal artery aneurysm.  He is finally recovering well with a normal BEATRICE in the left foot.  He complains of claudication in the right calf.  We will have a 6-month duplex ultrasound for surveillance of his PAD.    Subjective:      Patient ID: Shilo Olmos is a 58 y.o. male.    Patient presents to review AOIL/ BRAD s/p L fem-pop bypass done 1/16/24 & hematoma evac 1/17/24. Patient reports pain in R knee and some numbness.     SHIRLENE Olmos is a pleasant 58-year-old male who is accompanied by his wife Jerry to today's visit.  He is recovering quite well after all he has been through with a hematoma after the above-knee to below-knee popliteal artery bypass that was completed in mid January.  He is now ambulatory and returning to work.  He is compliant with his medical therapy.  He recently underwent a duplex ultrasound which has demonstrated a normal BEATRICE in the left foot.  He is still experiencing swelling to bilateral lower extremities, left greater than right.  We discussed the use of compression stockings and I have provided an additional above knee compression stocking for him to try.  He  "and his wife are gracious and kind and are encouraged to ask any questions that they may have regarding his care going forward.  At this time we did discuss the stable trial regarding his aortic aneurysm.  At this time we will continue with surveillance plan I rediscuss in the future if he and his wife Jerry become interested.  He is a current nonsmoker.    Review of Systems   Constitutional: Negative.    HENT: Negative.     Eyes: Negative.    Respiratory: Negative.     Cardiovascular: Negative.    Gastrointestinal: Negative.    Endocrine: Negative.    Genitourinary: Negative.    Musculoskeletal: Negative.    Skin: Negative.    Allergic/Immunologic: Negative.    Neurological: Negative.    Hematological: Negative.    Psychiatric/Behavioral: Negative.         Objective:  /84 (BP Location: Right arm, Patient Position: Sitting, Cuff Size: Standard)   Pulse 93   Ht 5' 11\" (1.803 m)   Wt 104 kg (229 lb)   BMI 31.94 kg/m²      Physical Exam  Constitutional:       Appearance: Normal appearance.   HENT:      Head: Normocephalic and atraumatic.      Nose: Nose normal. No rhinorrhea.   Eyes:      Extraocular Movements: Extraocular movements intact.      Pupils: Pupils are equal, round, and reactive to light.   Cardiovascular:      Rate and Rhythm: Normal rate and regular rhythm.      Pulses:           Femoral pulses are 2+ on the right side and 2+ on the left side.       Dorsalis pedis pulses are detected w/ Doppler on the right side and detected w/ Doppler on the left side.        Posterior tibial pulses are detected w/ Doppler on the right side and detected w/ Doppler on the left side.      Comments: Well-healed surgical incisions.  Mild swelling to the left lower extremity slightly more so than the right lower extremity.  Motor and sensory intact to the feet.  Pulmonary:      Effort: Pulmonary effort is normal.      Breath sounds: No stridor.   Abdominal:      General: There is no distension.      Tenderness: " There is no abdominal tenderness.   Musculoskeletal:         General: No tenderness. Normal range of motion.      Cervical back: Normal range of motion and neck supple.   Skin:     General: Skin is warm.      Capillary Refill: Capillary refill takes less than 2 seconds.      Coloration: Skin is not jaundiced.   Neurological:      General: No focal deficit present.      Mental Status: He is alert and oriented to person, place, and time.   Psychiatric:         Mood and Affect: Mood normal.         Behavior: Behavior normal.

## 2024-05-09 NOTE — ASSESSMENT & PLAN NOTE
Ongoing anticoagulation for lower extremity DVT in the setting of a patient who is a  and at risk for recurrent and embolic events associated with DVT.

## 2024-05-09 NOTE — PATIENT INSTRUCTIONS
1. Popliteal artery aneurysm, bilateral ([facility])  Assessment & Plan:  Occluded right popliteal artery aneurysm.  BEATRICE in the right of 0.84.  Left lower extremity with above-knee to below-knee popliteal artery bypass and exclusion of patent popliteal artery aneurysm.  He is finally recovering well with a normal BEATRICE in the left foot.  He complains of claudication in the right calf.  We will have a 6-month duplex ultrasound for surveillance of his PAD.    Orders:  -     VAS ARTERIAL DUPLEX- LOWER LIMB BILATERAL; Future; Expected date: 08/09/2024    2. Chronic deep vein thrombosis (DVT) of peroneal vein, unspecified laterality ([facility])  -     Elastic Bandages & Supports (Medical Compression Thigh High) MISC; Use daily 15-20mmHg    3. Abdominal aortic aneurysm (AAA) 3.0 cm to 5.5 cm in diameter in male ([facility])  Assessment & Plan:  4 cm infrarenal abdominal aortic aneurysm.  Discussed the stable trial as a potential option in his case.  At this time he has been through a lot in the last several months and would prefer to continue surveillance course at this time.  6-month aortoiliac duplex ordered.      4. Acute deep vein thrombosis (DVT) of tibial vein of right lower extremity ([facility])  Assessment & Plan:  Ongoing anticoagulation for lower extremity DVT in the setting of a patient who is a  and at risk for recurrent and embolic events associated with DVT.

## 2024-05-09 NOTE — ASSESSMENT & PLAN NOTE
4 cm infrarenal abdominal aortic aneurysm.  Discussed the stable trial as a potential option in his case.  At this time he has been through a lot in the last several months and would prefer to continue surveillance course at this time.  6-month aortoiliac duplex ordered.

## 2024-05-09 NOTE — LETTER
May 9, 2024     Naseem Corrigan DO  [address] [address]    Patient: Shilo Olmos   YOB: 1965   Date of Visit: 5/9/2024       Dear Dr. Corrigan:    Thank you for referring Shilo Olmos to me for evaluation. Below are my notes for this consultation.    If you have questions, please do not hesitate to call me. I look forward to following your patient along with you.         Sincerely,        Parker Valdez MD        CC: Shilo Olmos    Parker Valdez MD  5/9/2024  4:00 PM  Sign when Signing Visit  Assessment/Plan:    Abdominal aortic aneurysm (AAA) 3.0 cm to 5.5 cm in diameter in male (HCC)  4 cm infrarenal abdominal aortic aneurysm.  Discussed the stable trial as a potential option in his case.  At this time he has been through a lot in the last several months and would prefer to continue surveillance course at this time.  6-month aortoiliac duplex ordered.    Acute deep vein thrombosis (DVT) of tibial vein of right lower extremity (HCC)  Ongoing anticoagulation for lower extremity DVT in the setting of a patient who is a  and at risk for recurrent and embolic events associated with DVT.    Popliteal artery aneurysm, bilateral (HCC)  Occluded right popliteal artery aneurysm.  BEATRICE in the right of 0.84.  Left lower extremity with above-knee to below-knee popliteal artery bypass and exclusion of patent popliteal artery aneurysm.  He is finally recovering well with a normal BEATRICE in the left foot.  He complains of claudication in the right calf.  We will have a 6-month duplex ultrasound for surveillance of his PAD.    Subjective:      Patient ID: Shilo Olmos is a 58 y.o. male.    Patient presents to review AOIL/ BRAD s/p L fem-pop bypass done 1/16/24 & hematoma evac 1/17/24. Patient reports pain in R knee and some numbness.     HPI  Jalil Olmos is a pleasant 58-year-old male who is accompanied by his wife Jerry to today's  "visit.  He is recovering quite well after all he has been through with a hematoma after the above-knee to below-knee popliteal artery bypass that was completed in mid January.  He is now ambulatory and returning to work.  He is compliant with his medical therapy.  He recently underwent a duplex ultrasound which has demonstrated a normal BEATRICE in the left foot.  He is still experiencing swelling to bilateral lower extremities, left greater than right.  We discussed the use of compression stockings and I have provided an additional above knee compression stocking for him to try.  He and his wife are gracious and kind and are encouraged to ask any questions that they may have regarding his care going forward.  At this time we did discuss the stable trial regarding his aortic aneurysm.  At this time we will continue with surveillance plan I rediscuss in the future if he and his wife Jerry become interested.  He is a current nonsmoker.    Review of Systems   Constitutional: Negative.    HENT: Negative.     Eyes: Negative.    Respiratory: Negative.     Cardiovascular: Negative.    Gastrointestinal: Negative.    Endocrine: Negative.    Genitourinary: Negative.    Musculoskeletal: Negative.    Skin: Negative.    Allergic/Immunologic: Negative.    Neurological: Negative.    Hematological: Negative.    Psychiatric/Behavioral: Negative.         Objective:  /84 (BP Location: Right arm, Patient Position: Sitting, Cuff Size: Standard)   Pulse 93   Ht 5' 11\" (1.803 m)   Wt 104 kg (229 lb)   BMI 31.94 kg/m²      Physical Exam  Constitutional:       Appearance: Normal appearance.   HENT:      Head: Normocephalic and atraumatic.      Nose: Nose normal. No rhinorrhea.   Eyes:      Extraocular Movements: Extraocular movements intact.      Pupils: Pupils are equal, round, and reactive to light.   Cardiovascular:      Rate and Rhythm: Normal rate and regular rhythm.      Pulses:           Femoral pulses are 2+ on the right side " and 2+ on the left side.       Dorsalis pedis pulses are detected w/ Doppler on the right side and detected w/ Doppler on the left side.        Posterior tibial pulses are detected w/ Doppler on the right side and detected w/ Doppler on the left side.      Comments: Well-healed surgical incisions.  Mild swelling to the left lower extremity slightly more so than the right lower extremity.  Motor and sensory intact to the feet.  Pulmonary:      Effort: Pulmonary effort is normal.      Breath sounds: No stridor.   Abdominal:      General: There is no distension.      Tenderness: There is no abdominal tenderness.   Musculoskeletal:         General: No tenderness. Normal range of motion.      Cervical back: Normal range of motion and neck supple.   Skin:     General: Skin is warm.      Capillary Refill: Capillary refill takes less than 2 seconds.      Coloration: Skin is not jaundiced.   Neurological:      General: No focal deficit present.      Mental Status: He is alert and oriented to person, place, and time.   Psychiatric:         Mood and Affect: Mood normal.         Behavior: Behavior normal.

## 2024-05-09 NOTE — ASSESSMENT & PLAN NOTE
Occluded right popliteal artery aneurysm.  BEATRICE in the right of 0.84.  Left lower extremity with above-knee to below-knee popliteal artery bypass and exclusion of patent popliteal artery aneurysm.  He is finally recovering well with a normal BEATRICE in the left foot.  He complains of claudication in the right calf.  We will have a 6-month duplex ultrasound for surveillance of his PAD.

## 2024-05-14 DIAGNOSIS — I10 ESSENTIAL HYPERTENSION: ICD-10-CM

## 2024-05-15 RX ORDER — LOSARTAN POTASSIUM 100 MG/1
TABLET ORAL
Qty: 90 TABLET | Refills: 1 | Status: SHIPPED | OUTPATIENT
Start: 2024-05-15

## 2024-05-17 DIAGNOSIS — I82.441 ACUTE DEEP VEIN THROMBOSIS (DVT) OF TIBIAL VEIN OF RIGHT LOWER EXTREMITY (HCC): ICD-10-CM

## 2024-05-20 NOTE — TELEPHONE ENCOUNTER
Patients spouse called in on the refill line stating that the patient was told to continue the eliquis at his last visit and patient now only has 2 days left. Please review and send to pharmacy

## 2024-06-04 ENCOUNTER — OFFICE VISIT (OUTPATIENT)
Dept: FAMILY MEDICINE CLINIC | Facility: CLINIC | Age: 59
End: 2024-06-04
Payer: COMMERCIAL

## 2024-06-04 VITALS
BODY MASS INDEX: 32.31 KG/M2 | HEIGHT: 71 IN | DIASTOLIC BLOOD PRESSURE: 96 MMHG | RESPIRATION RATE: 16 BRPM | WEIGHT: 230.8 LBS | HEART RATE: 82 BPM | OXYGEN SATURATION: 96 % | SYSTOLIC BLOOD PRESSURE: 132 MMHG | TEMPERATURE: 96 F

## 2024-06-04 DIAGNOSIS — I10 ESSENTIAL HYPERTENSION: Primary | ICD-10-CM

## 2024-06-04 DIAGNOSIS — Z12.11 COLON CANCER SCREENING: ICD-10-CM

## 2024-06-04 DIAGNOSIS — R25.2 LEG CRAMPING: ICD-10-CM

## 2024-06-04 DIAGNOSIS — I82.441 ACUTE DEEP VEIN THROMBOSIS (DVT) OF TIBIAL VEIN OF RIGHT LOWER EXTREMITY (HCC): ICD-10-CM

## 2024-06-04 DIAGNOSIS — Z12.5 PROSTATE CANCER SCREENING: ICD-10-CM

## 2024-06-04 DIAGNOSIS — E78.00 PURE HYPERCHOLESTEROLEMIA: ICD-10-CM

## 2024-06-04 DIAGNOSIS — Z95.828 S/P FEMORAL-POPLITEAL BYPASS SURGERY: ICD-10-CM

## 2024-06-04 PROCEDURE — 99213 OFFICE O/P EST LOW 20 MIN: CPT | Performed by: FAMILY MEDICINE

## 2024-06-04 NOTE — PROGRESS NOTES
Ambulatory Visit  Name: Shilo Olmos      : 1965      MRN: [id number]  Encounter Provider: Naseem Corrigan DO  Encounter Date: 2024   Encounter department: [facility]    Assessment & Plan   1. Essential hypertension  Assessment & Plan:  - Stable.  Mildly elevated this morning.  Does monitor blood pressure at home and much better controlled.  -Continue current regimen losartan 100 mg p.o. daily  -Continue low-salt, low-carb, low sugar, high-fiber, whole food eating.  -Discussed recommended activity level with a goal of 30 minutes moderate intensity exercise 5 days/week.  -Follow-up in 6 months.   Orders:  -     Comprehensive metabolic panel; Future  2. Pure hypercholesterolemia  Assessment & Plan:  - Continues on rosuvastatin 10 mg p.o. daily.  Will recheck fasting blood work.  Continue dietary and lifestyle modifications as reviewed.  Orders:  -     Lipid Panel With Direct LDL; Future  3. Prostate cancer screening  -     PSA, Total Screen; Future  4. Leg cramping  -     Iron Panel (Includes Ferritin, Iron Sat%, Iron, and TIBC); Future  5. Colon cancer screening  -     Ambulatory Referral to Gastroenterology; Future  6. S/P femoral-popliteal bypass surgery  Assessment & Plan:  - Stable.  Following with vascular surgery.  Recent ABIs within normal range.  Continue follow-up with vascular as scheduled.  7. Acute deep vein thrombosis (DVT) of tibial vein of right lower extremity (HCC)  Assessment & Plan:  - Continues on Eliquis twice daily.  Denies any complaints.         History of Present Illness     Jalil is a 58-year-old male who presents today for follow-up.  He notes after his popliteal bypass surgery this year he did have concern for infection.  That has cleared up and he has followed with vascular surgeon as recommended and now is doing better.  He has been taking all of his medications as prescribed.  Denies any side effects.  He continues to abstain from smoking.  He is  trying to increase his activity slowly after surgery get back to his normal routine.  He is working on changing some dietary habits.  Notes he is getting about 6 hours of sleep at night but this is normal for him.  Denies any snoring.  Feels well rested in the morning.  Denies any other concerns today.      Review of Systems   Constitutional:  Negative for chills and fever.   HENT:  Negative for ear pain and sore throat.    Eyes:  Negative for pain and visual disturbance.   Respiratory:  Negative for cough and shortness of breath.    Cardiovascular:  Negative for chest pain and palpitations.   Gastrointestinal:  Negative for abdominal pain and vomiting.   Genitourinary:  Negative for dysuria and hematuria.   Musculoskeletal:  Negative for arthralgias and back pain.   Skin:  Negative for color change and rash.   Neurological:  Negative for seizures and syncope.   Psychiatric/Behavioral:  Negative for decreased concentration and sleep disturbance. The patient is not nervous/anxious.    All other systems reviewed and are negative.    Past Medical History:   Diagnosis Date    Hypertension     Psoriasis      Past Surgical History:   Procedure Laterality Date    CLOSURE WOUND Left 1/19/2024    Procedure: CLOSURE LEFT LOWER EXTREMITY WOUND, WASHOUT;  Surgeon: Parker Valdez MD;  [facility];  Service: Vascular    EVACUATION OF HEMATOMA Left 1/17/2024    Procedure: EVACUATION/ DRAINAGE HEMATOMA, EXPLORATION ON LEFT LEG BYPASS, CONTROLL OF BLEEDING;  Surgeon: Mariana Vargas DO;  [facility];  Service: Vascular    KNEE SURGERY Bilateral     VA BYPASS W/VEIN FEMORAL-POPLITEAL Left 1/16/2024    Procedure: BYPASS FEMORAL-POPLITEAL -Left above knee popliteal to below knee popliteal artery bypass WITH REVERSE SAPHENOUS VEIN GRAFT; EXCLUSION OF POPLITEAL ANEURYSM;  Surgeon: Parker Valdez MD;  [facility];  Service: Vascular     Family History   Problem Relation Age of Onset  "   Hypertension Mother     No Known Problems Father      Social History     Tobacco Use    Smoking status: Former     Current packs/day: 0.25     Average packs/day: 0.3 packs/day for 32.0 years (8.0 ttl pk-yrs)     Types: Cigarettes    Smokeless tobacco: Never    Tobacco comments:     1 PPD x 15 years - As per Wendi    Vaping Use    Vaping status: Never Used   Substance and Sexual Activity    Alcohol use: Yes     Comment: rare social use one time per month    Drug use: Never    Sexual activity: Yes     Partners: Female     Current Outpatient Medications on File Prior to Visit   Medication Sig    acetaminophen (TYLENOL) 325 mg tablet Take 3 tablets (975 mg total) by mouth every 8 (eight) hours    apixaban (Eliquis) 5 mg Take 1 tablet (5 mg total) by mouth 2 (two) times a day    aspirin 81 mg chewable tablet Chew 1 tablet (81 mg total) daily    Elastic Bandages & Supports (Medical Compression Thigh High) MISC Use daily 15-20mmHg    losartan (COZAAR) 100 MG tablet TAKE 1 TABLET BY MOUTH EVERY DAY    rosuvastatin (CRESTOR) 10 MG tablet Take 1 tablet (10 mg total) by mouth daily    calcium carbonate (TUMS) 500 mg chewable tablet Chew 1 tablet (500 mg total) daily as needed for indigestion or heartburn (Patient not taking: Reported on 6/4/2024)    Cholecalciferol (Vitamin D) 50 MCG (2000 UT) tablet Take 2,000 Units by mouth daily (Patient not taking: Reported on 10/30/2023)     No Known Allergies  Immunization History   Administered Date(s) Administered    COVID-19 MODERNA VACC 0.5 ML IM 10/12/2021, 11/09/2021     Objective     /96 (BP Location: Left arm, Patient Position: Sitting, Cuff Size: Standard)   Pulse 82   Temp (!) 96 °F (35.6 °C) (Tympanic)   Resp 16   Ht 5' 11\" (1.803 m)   Wt 105 kg (230 lb 12.8 oz)   SpO2 96%   BMI 32.19 kg/m²     Physical Exam  Vitals and nursing note reviewed.   Constitutional:       General: He is not in acute distress.     Appearance: Normal appearance.   HENT:      Head: " Normocephalic and atraumatic.      Right Ear: Tympanic membrane and external ear normal.      Left Ear: Tympanic membrane and external ear normal.      Nose: Nose normal.      Mouth/Throat:      Mouth: Mucous membranes are moist.   Eyes:      Extraocular Movements: Extraocular movements intact.      Conjunctiva/sclera: Conjunctivae normal.      Pupils: Pupils are equal, round, and reactive to light.   Cardiovascular:      Rate and Rhythm: Normal rate and regular rhythm.      Pulses: Normal pulses.      Heart sounds: Murmur heard.   Pulmonary:      Effort: Pulmonary effort is normal.      Breath sounds: Normal breath sounds. No wheezing, rhonchi or rales.   Abdominal:      General: Bowel sounds are normal.      Palpations: Abdomen is soft.      Tenderness: There is no abdominal tenderness. There is no guarding.   Musculoskeletal:         General: Normal range of motion.      Cervical back: Normal range of motion.      Right lower leg: No edema.      Left lower leg: No edema.   Lymphadenopathy:      Cervical: No cervical adenopathy.   Skin:     General: Skin is warm.      Capillary Refill: Capillary refill takes less than 2 seconds.   Neurological:      General: No focal deficit present.      Mental Status: He is alert and oriented to person, place, and time.   Psychiatric:         Mood and Affect: Mood normal.         Behavior: Behavior normal.       Administrative Statements

## 2024-06-06 PROBLEM — E78.00 PURE HYPERCHOLESTEROLEMIA: Status: ACTIVE | Noted: 2024-06-06

## 2024-06-06 NOTE — ASSESSMENT & PLAN NOTE
- Stable.  Mildly elevated this morning.  Does monitor blood pressure at home and much better controlled.  -Continue current regimen losartan 100 mg p.o. daily  -Continue low-salt, low-carb, low sugar, high-fiber, whole food eating.  -Discussed recommended activity level with a goal of 30 minutes moderate intensity exercise 5 days/week.  -Follow-up in 6 months.

## 2024-06-06 NOTE — ASSESSMENT & PLAN NOTE
- Continues on rosuvastatin 10 mg p.o. daily.  Will recheck fasting blood work.  Continue dietary and lifestyle modifications as reviewed.

## 2024-06-06 NOTE — ASSESSMENT & PLAN NOTE
- Stable.  Following with vascular surgery.  Recent ABIs within normal range.  Continue follow-up with vascular as scheduled.

## 2024-06-23 DIAGNOSIS — I82.441 ACUTE DEEP VEIN THROMBOSIS (DVT) OF TIBIAL VEIN OF RIGHT LOWER EXTREMITY (HCC): ICD-10-CM

## 2024-06-26 ENCOUNTER — HOSPITAL ENCOUNTER (OUTPATIENT)
Dept: NON INVASIVE DIAGNOSTICS | Facility: HOSPITAL | Age: 59
Discharge: HOME | End: 2024-06-26
Attending: INTERNAL MEDICINE
Payer: COMMERCIAL

## 2024-06-26 VITALS
SYSTOLIC BLOOD PRESSURE: 132 MMHG | HEIGHT: 71 IN | BODY MASS INDEX: 32.2 KG/M2 | DIASTOLIC BLOOD PRESSURE: 96 MMHG | WEIGHT: 230 LBS | HEART RATE: 80 BPM

## 2024-06-26 DIAGNOSIS — I77.810 THORACIC AORTIC ECTASIA (HCC): ICD-10-CM

## 2024-06-26 LAB
AORTIC ROOT: 4 CM
APICAL FOUR CHAMBER EJECTION FRACTION: 64 %
ASCENDING AORTA: 4.2 CM
BSA FOR ECHO PROCEDURE: 2.24 M2
E WAVE DECELERATION TIME: 165 MS
E/A RATIO: 1.14
MV E'TISSUE VEL-SEP: 7 CM/S
MV PEAK A VEL: 0.58 M/S
MV PEAK E VEL: 66 CM/S
MV STENOSIS PRESSURE HALF TIME: 48 MS
MV VALVE AREA P 1/2 METHOD: 4.58
SINOTUBULAR JUNCTION: 3.3 CM
SL CV SINUS OF VALSALVA 2D: 4 CM
STJ: 3.3 CM

## 2024-06-26 PROCEDURE — 93308 TTE F-UP OR LMTD: CPT | Performed by: INTERNAL MEDICINE

## 2024-06-26 PROCEDURE — 93321 DOPPLER ECHO F-UP/LMTD STD: CPT

## 2024-06-26 PROCEDURE — 93321 DOPPLER ECHO F-UP/LMTD STD: CPT | Performed by: INTERNAL MEDICINE

## 2024-06-26 PROCEDURE — 93308 TTE F-UP OR LMTD: CPT

## 2024-06-26 PROCEDURE — 93325 DOPPLER ECHO COLOR FLOW MAPG: CPT | Performed by: INTERNAL MEDICINE

## 2024-06-26 PROCEDURE — 93325 DOPPLER ECHO COLOR FLOW MAPG: CPT

## 2024-06-27 ENCOUNTER — TELEPHONE (OUTPATIENT)
Age: 59
End: 2024-06-27

## 2024-06-27 NOTE — TELEPHONE ENCOUNTER
Patients GI provider:  Nicole    Number to return call: [phone]    Reason for call: Pt on Eliquis    Scheduled procedure/appointment date if applicable: appt  9/4/24 06/27/24  Screened by: Ginny Hess    Referring Provider     Pre- Screening:     There is no height or weight on file to calculate BMI. 32.08  Has patient been referred for a routine screening Colonoscopy? yes  Is the patient between 45-75 years old? yes      Previous Colonoscopy yes   If yes:    Date:     Facility:     Reason:       Does the patient want to see a Gastroenterologist prior to their procedure OR are they having any GI symptoms? no    Has the patient been hospitalized or had abdominal surgery in the past 6 months? no    Does the patient use supplemental oxygen? no    Does the patient take Coumadin, Lovenox, Plavix, Elliquis, Xarelto, or other blood thinning medication? Yes Eliquis    Has the patient had a stroke, cardiac event, or stent placed in the past year? no    If patient is between 45yrs - 49yrs, please advise patient that we will have to confirm benefits & coverage with their insurance company for a routine screening colonoscopy.

## 2024-08-05 DIAGNOSIS — I10 ESSENTIAL HYPERTENSION: ICD-10-CM

## 2024-08-06 RX ORDER — LOSARTAN POTASSIUM 100 MG/1
100 TABLET ORAL DAILY
Qty: 90 TABLET | Refills: 1 | Status: SHIPPED | OUTPATIENT
Start: 2024-08-06

## 2024-08-09 ENCOUNTER — HOSPITAL ENCOUNTER (OUTPATIENT)
Dept: VASCULAR ULTRASOUND | Facility: HOSPITAL | Age: 59
Discharge: HOME | End: 2024-08-09
Attending: SURGERY
Payer: COMMERCIAL

## 2024-08-09 DIAGNOSIS — I72.4 POPLITEAL ARTERY ANEURYSM, BILATERAL (HCC): ICD-10-CM

## 2024-08-09 PROCEDURE — 93925 LOWER EXTREMITY STUDY: CPT

## 2024-08-09 PROCEDURE — 93923 UPR/LXTR ART STDY 3+ LVLS: CPT

## 2024-08-11 PROCEDURE — 93925 LOWER EXTREMITY STUDY: CPT | Performed by: SURGERY

## 2024-08-11 PROCEDURE — 93922 UPR/L XTREMITY ART 2 LEVELS: CPT | Performed by: SURGERY

## 2024-09-04 ENCOUNTER — TELEPHONE (OUTPATIENT)
Dept: GASTROENTEROLOGY | Facility: CLINIC | Age: 59
End: 2024-09-04

## 2024-09-04 ENCOUNTER — OFFICE VISIT (OUTPATIENT)
Dept: GASTROENTEROLOGY | Facility: CLINIC | Age: 59
End: 2024-09-04
Payer: COMMERCIAL

## 2024-09-04 VITALS
HEART RATE: 71 BPM | SYSTOLIC BLOOD PRESSURE: 143 MMHG | WEIGHT: 225 LBS | BODY MASS INDEX: 31.5 KG/M2 | DIASTOLIC BLOOD PRESSURE: 102 MMHG | HEIGHT: 71 IN

## 2024-09-04 DIAGNOSIS — Z86.010 HX OF COLONIC POLYP: Primary | ICD-10-CM

## 2024-09-04 DIAGNOSIS — Z12.11 COLON CANCER SCREENING: ICD-10-CM

## 2024-09-04 PROCEDURE — 99213 OFFICE O/P EST LOW 20 MIN: CPT | Performed by: PHYSICIAN ASSISTANT

## 2024-09-04 RX ORDER — SODIUM PICOSULFATE, MAGNESIUM OXIDE, AND ANHYDROUS CITRIC ACID 12; 3.5; 1 G/175ML; G/175ML; MG/175ML
LIQUID ORAL
Qty: 350 ML | Refills: 0 | Status: SHIPPED | OUTPATIENT
Start: 2024-09-04

## 2024-09-04 NOTE — PROGRESS NOTES
[facility] - Outpatient Follow-up Note  Shilo Olmos 58 y.o. male MRN: [id number]  Encounter: [id number]          ASSESSMENT AND PLAN:      1. Hx of colonic polyp  Patient with history of colon polyp and is due for colonoscopy in 2 years which was at the end of 2023 we will schedule for colonoscopy at this time.  Clenpiq prep ordered and prep and procedure explained to patient and we will get clearance from vascular to hold the Eliquis for 48 hours.    2. Colon cancer screening  - Ambulatory Referral to Gastroenterology    ______________________________________________________________________    SUBJECTIVE:        58-year-old male who presents today for office visit for consultation to colonoscopy.  Colonoscopy last performed in 2021 which did show tubular adenomatous colon polyp.  Was recommended to have colonoscopy in 2 years.  Patient takes Eliquis for history of DVT and does have peripheral vascular disease with femoropopliteal bypass in the past.  Does have abdominal aortic aneurysm which is being followed as well by vascular.  Denies any lower GI symptoms.  Denies any upper GI symptoms such as reflux or difficulty swallowing.  Denies any abdominal pain or change in bowel habits.                    Colonoscopy-  IMPRESSION:  The cecum appeared normal.  Three polyps measuring 5-9 mm in the descending colon; performed cold forceps biopsy; removed by cold snare  Multiple diverticula in the descending colon and sigmoid colon  Distal sigmoid colon-at about 20 centimeters from the anal verge there is a  polypoid lesion at the tip of a thick fold that was removed with snare cautery and placed couple of hemoclips    Path-  A. Colon, Descending (Polyp), Biopsy:  - Tubular adenoma.     B. Colon, Distal Sigmoid (Polyp), Biopsy:  - Hyperplastic polyp      Recall 2 years      REVIEW OF SYSTEMS IS OTHERWISE NEGATIVE.      Historical Information   Past Medical History:   Diagnosis Date     "Hypertension     Psoriasis      Past Surgical History:   Procedure Laterality Date    CLOSURE WOUND Left 1/19/2024    Procedure: CLOSURE LEFT LOWER EXTREMITY WOUND, WASHOUT;  Surgeon: Parker Valdez MD;  [facility];  Service: Vascular    EVACUATION OF HEMATOMA Left 1/17/2024    Procedure: EVACUATION/ DRAINAGE HEMATOMA, EXPLORATION ON LEFT LEG BYPASS, CONTROLL OF BLEEDING;  Surgeon: Mariana Vargas DO;  [facility];  Service: Vascular    KNEE SURGERY Bilateral     NE BYPASS W/VEIN FEMORAL-POPLITEAL Left 1/16/2024    Procedure: BYPASS FEMORAL-POPLITEAL -Left above knee popliteal to below knee popliteal artery bypass WITH REVERSE SAPHENOUS VEIN GRAFT; EXCLUSION OF POPLITEAL ANEURYSM;  Surgeon: Parker Valdez MD;  [facility];  Service: Vascular     Social History   Social History     Substance and Sexual Activity   Alcohol Use Yes    Comment: rare social use one time per month     Social History     Substance and Sexual Activity   Drug Use Never     Social History     Tobacco Use   Smoking Status Former    Current packs/day: 0.25    Average packs/day: 0.3 packs/day for 32.0 years (8.0 ttl pk-yrs)    Types: Cigarettes   Smokeless Tobacco Never   Tobacco Comments    1 PPD x 15 years - As per Wendi      Family History   Problem Relation Age of Onset    Hypertension Mother     No Known Problems Father        Meds/Allergies       Current Outpatient Medications:     acetaminophen (TYLENOL) 325 mg tablet    apixaban (Eliquis) 5 mg    Elastic Bandages & Supports (Medical Compression Thigh High) MISC    losartan (COZAAR) 100 MG tablet    rosuvastatin (CRESTOR) 10 MG tablet    aspirin 81 mg chewable tablet    calcium carbonate (TUMS) 500 mg chewable tablet    Cholecalciferol (Vitamin D) 50 MCG (2000 UT) tablet    No Known Allergies        Objective     Blood pressure (!) 143/102, pulse 71, height 5' 11\" (1.803 m), weight 102 kg (225 lb). Body mass index is 31.38 " kg/m².      PHYSICAL EXAM:      General Appearance:   Alert, cooperative, no distress   HEENT:   Normocephalic, atraumatic, anicteric.     Neck:  Supple, symmetrical, trachea midline   Lungs:   Clear to auscultation bilaterally; no rales, rhonchi or wheezing; respirations unlabored    Heart::   Regular rate and rhythm; no murmur, rub, or gallop.   Abdomen:   Soft, non-tender, non-distended; normal bowel sounds; no masses, no organomegaly    Genitalia:   Deferred    Rectal:   Deferred    Extremities:  No cyanosis, clubbing or edema    Pulses:  2+ and symmetric    Skin:  No jaundice, rashes, or lesions    Lymph nodes:  No palpable cervical lymphadenopathy        Lab Results:   No visits with results within 1 Day(s) from this visit.   Latest known visit with results is:   Hospital Outpatient Visit on 06/26/2024   Component Date Value    BSA 06/26/2024 2.24     A4C EF 06/26/2024 64     MV E' Tissue Velocity Se* 06/26/2024 7     E/A ratio 06/26/2024 1.14     E wave deceleration time 06/26/2024 165     MV Peak E Ray 06/26/2024 66     MV Peak A Ray 06/26/2024 0.58     MV stenosis pressure 1/2* 06/26/2024 48     MV valve area p 1/2 meth* 06/26/2024 4.58     STJ 06/26/2024 3.3     Asc Ao 06/26/2024 4.2     Sinus of Valsalva, 2D 06/26/2024 4     Ao STJ 06/26/2024 3.30     Ao root 06/26/2024 4.00          Radiology Results:   VAS ARTERIAL DUPLEX- LOWER LIMB BILATERAL    Result Date: 8/11/2024  Narrative:  [facility] REPORT CLINICAL: Indications:  Aneurysm of Artery of Lower Extremity [I72.4]. Post-op evaluation s/p revascularization. Patient does describe bilateral LE R>L claudication. Operative History: 2024-01-16 Left femoral-popliteal bypass surgery Risk Factors The patient has history of HTN, PAD and smoking (current) 0.3 ppd. Clinical Right Pressure:  140/ mm Hg, Left Pressure:  137/ mm Hg.  FINDINGS:  Post. Tibial, Right      Waveform: Monophasic Ant. Tibial, Right      Waveform: Monophasic Common Femoral  Artery, Right      PSV (cm/s): 30      EDV: 0      AP (cm): 1.9 Prox Profunda, Right      PSV (cm/s): 46      EDV: 0 Prox SFA, Right      PSV (cm/s): 50      EDV: 0 Mid SFA, Right      PSV (cm/s): 27      EDV: 0 Dist SFA, Right      PSV (cm/s): 39      EDV: 0 Proximal Pop, Right      Impression: Occluded      PSV (cm/s): 0      EDV: 0      AP (cm): 2.1 Distal Pop, Right      Impression: Occluded      PSV (cm/s): 0      EDV: 0      AP (cm): 2.5 Tibioperoneal, Right      PSV (cm/s): 16 Dist Post Tibial, Right      PSV (cm/s): 27      EDV: 0 Dist. Ant. Tibial, Right      PSV (cm/s): 14      EDV: 0      Acceleration Time: 0.070 Dist Peroneal, Right      PSV (cm/s): 0      EDV: 0 DorsPedis, Right      PSV (cm/s): 14      EDV: 1      Acceleration Time: 0.130 Post. Tibial, Left      Waveform: Biphasic Ant. Tibial, Left      Waveform: Biphasic Inflow Anastomosis, Left      PSV (cm/s): 77 Mid Thigh (Graft), Left      PSV (cm/s): 115 Low Thigh (Graft), Left      PSV (cm/s): 86 Near Knee (Graft), Left      PSV (cm/s): 71 Outflow Anastomosis, Left      PSV (cm/s): 91      EDV: 1 Common Femoral Artery, Left      PSV (cm/s): 29      EDV: 8      AP (cm): 1.6 Prox Profunda, Left      PSV (cm/s): 40      EDV: 12 Prox SFA, Left      PSV (cm/s): 82      EDV: 11 Mid SFA, Left      PSV (cm/s): 49      EDV: 0 Dist SFA, Left      PSV (cm/s): 44      EDV: 7 Proximal Pop, Left      Impression: Occluded Distal Pop, Left      Impression: Occluded Tibioperoneal, Left      PSV (cm/s): 46 Dist Post Tibial, Left      PSV (cm/s): 49      EDV: 0 Dist. Ant. Tibial, Left      PSV (cm/s): 46      EDV: 0 Dist Peroneal, Left      PSV (cm/s): 31      EDV: 0 DorsPedis, Left      PSV (cm/s): 47      EDV: 0      Acceleration Time: 0.110    CONCLUSION: Impression: RIGHT LOWER LIMB: Known occlusion in the proximal popliteal artery with reconstitution in the tibioperoneal trunk artery. The common femoral artery is ectatic measuring 1.9cm. Ankle/Brachial index:  0.85 which is in the mild disease category (Prior 0.84 ) PVR/ PPG tracings are dampened. Metatarsal pressure of 121  mm Hg Great toe pressure of 77 mm Hg, within the healing range Pedal Acceleration time: 130 ms which is in the mild range.  LEFT LOWER LIMB: Patent distal superficial femoral to popliteal artery bypass graft. The popliteal artery measures 1.16cm beyond the distal anastomosis. Ankle/Brachial index:1.06 which is in the normal category (Prior 0.13 ) PVR/ PPG tracings are normal. Metatarsal pressure of 178 mm Hg Great toe pressure of 130 mm Hg, within the healing range Pedal Acceleration time: 130 ms which is in the mild range.  Compared to previous study on 04/26/2024, there is no significant change is noted.  SIGNATURE: Electronically Signed by: JUANITA LAZCANO MD on 2024-08-11 06:52:25 PM

## 2024-09-04 NOTE — TELEPHONE ENCOUNTER
Attempted to call pt to let him know that he is cleared to hold his eliquis for 2 days prior to procedure but unable to leave  a msg. Will try again later. Sb       Scheduled date of colonoscopy (as of today):10/23/24  Physician performing colonoscopy:Dr Harrell   [facility] of colonoscopy[facility]   Bowel prep reviewed with patient:loy alicea  Instructions reviewed with patient by:sb  Clearances: Dr Sweet faxed

## 2024-09-06 ENCOUNTER — TELEPHONE (OUTPATIENT)
Age: 59
End: 2024-09-06

## 2024-09-06 NOTE — TELEPHONE ENCOUNTER
Caller: [facility]     Doctor:  Courtney    Reason for call:  GI office calling in states that forms were received  by us to them but where it asks if medication can be stopped 2 days prior it is not answered. Transferred call to office.

## 2024-09-07 ENCOUNTER — APPOINTMENT (EMERGENCY)
Dept: RADIOLOGY | Facility: HOSPITAL | Age: 59
End: 2024-09-07
Payer: COMMERCIAL

## 2024-09-07 ENCOUNTER — HOSPITAL ENCOUNTER (EMERGENCY)
Facility: HOSPITAL | Age: 59
Discharge: HOME | End: 2024-09-07
Attending: EMERGENCY MEDICINE
Payer: COMMERCIAL

## 2024-09-07 VITALS
DIASTOLIC BLOOD PRESSURE: 98 MMHG | SYSTOLIC BLOOD PRESSURE: 134 MMHG | TEMPERATURE: 98 F | OXYGEN SATURATION: 96 % | RESPIRATION RATE: 18 BRPM | HEART RATE: 57 BPM

## 2024-09-07 DIAGNOSIS — M79.601 BILATERAL ARM PAIN: Primary | ICD-10-CM

## 2024-09-07 DIAGNOSIS — M79.602 BILATERAL ARM PAIN: Primary | ICD-10-CM

## 2024-09-07 LAB
ANION GAP SERPL CALCULATED.3IONS-SCNC: 7 MMOL/L (ref 4–13)
BASOPHILS # BLD AUTO: 0.07 THOUSANDS/ΜL (ref 0–0.1)
BASOPHILS NFR BLD AUTO: 1 % (ref 0–1)
BUN SERPL-MCNC: 15 MG/DL (ref 5–25)
CALCIUM SERPL-MCNC: 8.7 MG/DL (ref 8.4–10.2)
CARDIAC TROPONIN I PNL SERPL HS: 3 NG/L
CHLORIDE SERPL-SCNC: 106 MMOL/L (ref 96–108)
CK SERPL-CCNC: 238 U/L (ref 39–308)
CO2 SERPL-SCNC: 24 MMOL/L (ref 21–32)
CREAT SERPL-MCNC: 0.92 MG/DL (ref 0.6–1.3)
EOSINOPHIL # BLD AUTO: 0.16 THOUSAND/ΜL (ref 0–0.61)
EOSINOPHIL NFR BLD AUTO: 2 % (ref 0–6)
ERYTHROCYTE [DISTWIDTH] IN BLOOD BY AUTOMATED COUNT: 15.9 % (ref 11.6–15.1)
GFR SERPL CREATININE-BSD FRML MDRD: 91 ML/MIN/1.73SQ M
GLUCOSE SERPL-MCNC: 90 MG/DL (ref 65–140)
HCT VFR BLD AUTO: 37.7 % (ref 36.5–49.3)
HGB BLD-MCNC: 12 G/DL (ref 12–17)
IMM GRANULOCYTES # BLD AUTO: 0.02 THOUSAND/UL (ref 0–0.2)
IMM GRANULOCYTES NFR BLD AUTO: 0 % (ref 0–2)
LYMPHOCYTES # BLD AUTO: 1.88 THOUSANDS/ΜL (ref 0.6–4.47)
LYMPHOCYTES NFR BLD AUTO: 27 % (ref 14–44)
MCH RBC QN AUTO: 24.9 PG (ref 26.8–34.3)
MCHC RBC AUTO-ENTMCNC: 31.8 G/DL (ref 31.4–37.4)
MCV RBC AUTO: 78 FL (ref 82–98)
MONOCYTES # BLD AUTO: 0.67 THOUSAND/ΜL (ref 0.17–1.22)
MONOCYTES NFR BLD AUTO: 10 % (ref 4–12)
NEUTROPHILS # BLD AUTO: 4.15 THOUSANDS/ΜL (ref 1.85–7.62)
NEUTS SEG NFR BLD AUTO: 60 % (ref 43–75)
NRBC BLD AUTO-RTO: 0 /100 WBCS
PLATELET # BLD AUTO: 231 THOUSANDS/UL (ref 149–390)
PMV BLD AUTO: 8.9 FL (ref 8.9–12.7)
POTASSIUM SERPL-SCNC: 4.2 MMOL/L (ref 3.5–5.3)
RBC # BLD AUTO: 4.82 MILLION/UL (ref 3.88–5.62)
SODIUM SERPL-SCNC: 137 MMOL/L (ref 135–147)
TSH SERPL DL<=0.05 MIU/L-ACNC: 1.21 UIU/ML (ref 0.45–4.5)
WBC # BLD AUTO: 6.95 THOUSAND/UL (ref 4.31–10.16)

## 2024-09-07 PROCEDURE — 85025 COMPLETE CBC W/AUTO DIFF WBC: CPT

## 2024-09-07 PROCEDURE — 86618 LYME DISEASE ANTIBODY: CPT

## 2024-09-07 PROCEDURE — 82550 ASSAY OF CK (CPK): CPT

## 2024-09-07 PROCEDURE — 71045 X-RAY EXAM CHEST 1 VIEW: CPT

## 2024-09-07 PROCEDURE — 80048 BASIC METABOLIC PNL TOTAL CA: CPT

## 2024-09-07 PROCEDURE — 84443 ASSAY THYROID STIM HORMONE: CPT

## 2024-09-07 PROCEDURE — 36415 COLL VENOUS BLD VENIPUNCTURE: CPT

## 2024-09-07 PROCEDURE — 99283 EMERGENCY DEPT VISIT LOW MDM: CPT

## 2024-09-07 PROCEDURE — 93005 ELECTROCARDIOGRAM TRACING: CPT

## 2024-09-07 PROCEDURE — 84484 ASSAY OF TROPONIN QUANT: CPT

## 2024-09-07 PROCEDURE — 99284 EMERGENCY DEPT VISIT MOD MDM: CPT | Performed by: EMERGENCY MEDICINE

## 2024-09-07 RX ORDER — SODIUM CHLORIDE 9 MG/ML
3 INJECTION INTRAVENOUS
Status: DISCONTINUED | OUTPATIENT
Start: 2024-09-07 | End: 2024-09-07 | Stop reason: HOSPADM

## 2024-09-08 LAB
ATRIAL RATE: 58 BPM
B BURGDOR IGG+IGM SER QL IA: NEGATIVE
P AXIS: 34 DEGREES
PR INTERVAL: 154 MS
QRS AXIS: -2 DEGREES
QRSD INTERVAL: 80 MS
QT INTERVAL: 424 MS
QTC INTERVAL: 416 MS
T WAVE AXIS: 52 DEGREES
VENTRICULAR RATE: 58 BPM

## 2024-09-08 PROCEDURE — 93010 ELECTROCARDIOGRAM REPORT: CPT | Performed by: INTERNAL MEDICINE

## 2024-09-08 NOTE — DISCHARGE INSTRUCTIONS
Follow-up with rheumatology and complaints the spine.  Take Tylenol/ibuprofen as needed for pain.    Follow-up with PCP as needed for additional management.

## 2024-09-08 NOTE — ED PROVIDER NOTES
History  Chief Complaint   Patient presents with    Arm Pain     Pt states he has been having increased pain in B/L arms and across his chest, reports pain is increased in the AM upon waking up.    Back Pain     Pt states pain across his upper back and in his lower back.     58-year-old male with history of DVT on Eliquis, hypertension, AAA presenting for bilateral arm pain (L>R).  Reports pain has been ongoing for months but finally presented to ED at the request of his wife.  Notes pain along his biceps bilaterally and radiates to his upper chest.  Pain is worse in the mornings but improves throughout the day.  Originally tried Tylenol with no relief and has not been taking any pain medication since.  Patient also notes chronic neck and low back pain.  Notes some weakness in the hands without numbness.  Also notes remote history of quadriceps pain bilaterally.  No fevers, shortness of breath, abdominal pain, change in bladder/bowel function.        Arm Pain  Associated symptoms: chest pain and myalgias    Associated symptoms: no abdominal pain, no cough, no fever, no rash, no shortness of breath and no vomiting    Back Pain  Associated symptoms: chest pain    Associated symptoms: no abdominal pain and no fever        Prior to Admission Medications   Prescriptions Last Dose Informant Patient Reported? Taking?   Cholecalciferol (Vitamin D) 50 MCG (2000 UT) tablet  Self Yes No   Sig: Take 2,000 Units by mouth daily   Patient not taking: Reported on 10/30/2023   Elastic Bandages & Supports (Medical Compression Thigh High) MISC  Self No No   Sig: Use daily 15-20mmHg   acetaminophen (TYLENOL) 325 mg tablet  Self No No   Sig: Take 3 tablets (975 mg total) by mouth every 8 (eight) hours   apixaban (Eliquis) 5 mg  Self No No   Sig: Take 1 tablet (5 mg total) by mouth 2 (two) times a day   aspirin 81 mg chewable tablet  Self No No   Sig: Chew 1 tablet (81 mg total) daily   calcium carbonate (TUMS) 500 mg chewable tablet   Self No No   Sig: Chew 1 tablet (500 mg total) daily as needed for indigestion or heartburn   Patient not taking: Reported on 6/4/2024   losartan (COZAAR) 100 MG tablet  Self No No   Sig: Take 1 tablet (100 mg total) by mouth daily   rosuvastatin (CRESTOR) 10 MG tablet  Self No No   Sig: Take 1 tablet (10 mg total) by mouth daily   sodium picosulfate, magnesium oxide, citric acid (Clenpiq) oral solution   No No   Sig: Take 175 mL (1 bottle) the evening before the colonoscopy, between 5 PM and 9 PM, followed by a second 175 mL bottle 5 hours before the colonoscopy.      Facility-Administered Medications: None       Past Medical History:   Diagnosis Date    Hypertension     Psoriasis        Past Surgical History:   Procedure Laterality Date    CLOSURE WOUND Left 1/19/2024    Procedure: CLOSURE LEFT LOWER EXTREMITY WOUND, WASHOUT;  Surgeon: Parker Valdez MD;  Location: [facility];  Service: Vascular    EVACUATION OF HEMATOMA Left 1/17/2024    Procedure: EVACUATION/ DRAINAGE HEMATOMA, EXPLORATION ON LEFT LEG BYPASS, CONTROLL OF BLEEDING;  Surgeon: Mariana Vargas DO;  Location: [facility];  Service: Vascular    KNEE SURGERY Bilateral     ND BYPASS W/VEIN FEMORAL-POPLITEAL Left 1/16/2024    Procedure: BYPASS FEMORAL-POPLITEAL -Left above knee popliteal to below knee popliteal artery bypass WITH REVERSE SAPHENOUS VEIN GRAFT; EXCLUSION OF POPLITEAL ANEURYSM;  Surgeon: Parker Valdez MD;  Location: [facility];  Service: Vascular       Family History   Problem Relation Age of Onset    Hypertension Mother     No Known Problems Father      I have reviewed and agree with the history as documented.    E-Cigarette/Vaping    E-Cigarette Use Never User      E-Cigarette/Vaping Substances    Nicotine No     THC No     CBD No     Flavoring No     Other No     Unknown No      Social History     Tobacco Use    Smoking status: Former     Current packs/day: 0.25     Average packs/day: 0.3 packs/day for 32.0  years (8.0 ttl pk-yrs)     Types: Cigarettes    Smokeless tobacco: Never    Tobacco comments:     1 PPD x 15 years - As per [facility]    Vaping Use    Vaping status: Never Used   Substance Use Topics    Alcohol use: Yes     Comment: rare social use one time per month    Drug use: Never        Review of Systems   Constitutional:  Negative for chills and fever.   Eyes:  Negative for visual disturbance.   Respiratory:  Negative for cough and shortness of breath.    Cardiovascular:  Positive for chest pain. Negative for palpitations.   Gastrointestinal:  Negative for abdominal pain and vomiting.   Genitourinary:  Negative for hematuria.   Musculoskeletal:  Positive for arthralgias, back pain, myalgias and neck pain. Negative for gait problem and joint swelling.   Skin:  Negative for color change and rash.   Neurological:  Negative for syncope.   All other systems reviewed and are negative.      Physical Exam  ED Triage Vitals [09/07/24 1939]   Temperature Pulse Respirations Blood Pressure SpO2   98 °F (36.7 °C) 69 18 (!) 158/101 97 %      Temp Source Heart Rate Source Patient Position - Orthostatic VS BP Location FiO2 (%)   Oral Monitor Lying Right arm --      Pain Score       --             Orthostatic Vital Signs  Vitals:    09/07/24 1939 09/07/24 2100   BP: (!) 158/101 134/98   Pulse: 69 57   Patient Position - Orthostatic VS: Lying        Physical Exam  Vitals and nursing note reviewed.   Constitutional:       General: He is not in acute distress.     Appearance: He is well-developed.   HENT:      Head: Normocephalic and atraumatic.   Eyes:      Conjunctiva/sclera: Conjunctivae normal.   Cardiovascular:      Rate and Rhythm: Normal rate and regular rhythm.      Heart sounds: No murmur heard.  Pulmonary:      Effort: Pulmonary effort is normal. No respiratory distress.      Breath sounds: Normal breath sounds.   Abdominal:      Palpations: Abdomen is soft.      Tenderness: There is no abdominal tenderness.    Musculoskeletal:         General: Tenderness (ttp over the upper back along rhomboid muscles, along biceps muscle) present. No swelling or signs of injury.      Cervical back: Neck supple.   Skin:     General: Skin is warm and dry.      Capillary Refill: Capillary refill takes less than 2 seconds.   Neurological:      General: No focal deficit present.      Mental Status: He is alert and oriented to person, place, and time.      Sensory: No sensory deficit.      Motor: No weakness.   Psychiatric:         Mood and Affect: Mood normal.         ED Medications  Medications   sodium chloride (PF) 0.9 % injection 3 mL (has no administration in time range)       Diagnostic Studies  Results Reviewed       Procedure Component Value Units Date/Time    TSH, 3rd generation with Free T4 reflex [[id number]]  (Normal) Collected: 09/07/24 2028    Lab Status: Final result Specimen: Blood from Arm, Right Updated: 09/07/24 2108     TSH 3RD GENERATON 1.209 uIU/mL     HS Troponin 0hr (reflex protocol) [[id number]]  (Normal) Collected: 09/07/24 2028    Lab Status: Final result Specimen: Blood from Arm, Right Updated: 09/07/24 2058     hs TnI 0hr 3 ng/L     Basic metabolic panel [[id number]] Collected: 09/07/24 2028    Lab Status: Final result Specimen: Blood from Arm, Right Updated: 09/07/24 2050     Sodium 137 mmol/L      Potassium 4.2 mmol/L      Chloride 106 mmol/L      CO2 24 mmol/L      ANION GAP 7 mmol/L      BUN 15 mg/dL      Creatinine 0.92 mg/dL      Glucose 90 mg/dL      Calcium 8.7 mg/dL      eGFR 91 ml/min/1.73sq m     Narrative:      National Kidney Disease Foundation guidelines for Chronic Kidney Disease (CKD):     Stage 1 with normal or high GFR (GFR > 90 mL/min/1.73 square meters)    Stage 2 Mild CKD (GFR = 60-89 mL/min/1.73 square meters)    Stage 3A Moderate CKD (GFR = 45-59 mL/min/1.73 square meters)    Stage 3B Moderate CKD (GFR = 30-44 mL/min/1.73 square meters)    Stage 4 Severe CKD (GFR = 15-29 mL/min/1.73 square  meters)    Stage 5 End Stage CKD (GFR <15 mL/min/1.73 square meters)  Note: GFR calculation is accurate only with a steady state creatinine    CK [[id number]]  (Normal) Collected: 09/07/24 2028    Lab Status: Final result Specimen: Blood from Arm, Right Updated: 09/07/24 2050     Total  U/L     CBC and differential [[id number]]  (Abnormal) Collected: 09/07/24 2028    Lab Status: Final result Specimen: Blood from Arm, Right Updated: 09/07/24 2036     WBC 6.95 Thousand/uL      RBC 4.82 Million/uL      Hemoglobin 12.0 g/dL      Hematocrit 37.7 %      MCV 78 fL      MCH 24.9 pg      MCHC 31.8 g/dL      RDW 15.9 %      MPV 8.9 fL      Platelets 231 Thousands/uL      nRBC 0 /100 WBCs      Segmented % 60 %      Immature Grans % 0 %      Lymphocytes % 27 %      Monocytes % 10 %      Eosinophils Relative 2 %      Basophils Relative 1 %      Absolute Neutrophils 4.15 Thousands/µL      Absolute Immature Grans 0.02 Thousand/uL      Absolute Lymphocytes 1.88 Thousands/µL      Absolute Monocytes 0.67 Thousand/µL      Eosinophils Absolute 0.16 Thousand/µL      Basophils Absolute 0.07 Thousands/µL     Lyme Total AB W Reflex to IGM/IGG [[id number]] Collected: 09/07/24 2028    Lab Status: In process Specimen: Blood from Arm, Right Updated: 09/07/24 2033    Narrative:      The following orders were created for panel order Lyme Total AB W Reflex to IGM/IGG.  Procedure                               Abnormality         Status                     ---------                               -----------         ------                     Lyme Total AB W Reflex t...[[id number]]                      In process                   Please view results for these tests on the individual orders.    Lyme Total AB W Reflex to IGM/IGG [[id number]] Collected: 09/07/24 2028    Lab Status: In process Specimen: Blood from Arm, Right Updated: 09/07/24 2033                   X-ray chest 1 view portable    (Results Pending)         Procedures  Procedures      ED  Course  ED Course as of 09/07/24 2140   Sat Sep 07, 2024   2011 58-year-old male with history of DVT on Eliquis, hypertension, AAA presenting for bilateral arm pain (L>R).  Reports pain has been ongoing for months but finally presented to ED at the request of his wife.  Notes pain along his biceps bilaterally and radiates to his upper chest.  Pain is worse in the mornings but improves throughout the day.  Originally tried Tylenol with no relief and has not been taking any pain medication since.  Patient also notes chronic neck and low back pain.  Notes some weakness in the hands without numbness.  Also notes remote history of quadriceps pain bilaterally.  No fevers, shortness of breath, abdominal pain, change in bladder/bowel function.   2056 Total CK: 238   2100 hs TnI 0hr: 3  Low suspicion for ACS.   2102 Differential at this time includes ACS spectrum, polymyalgia rheumatica, dermatomyositis/polymyositis, fibromyalgia, cervical radiculopathy, Lyme, hypothyroidism.   2105 CBC, CMP grossly unremarkable.   2106 Plan to discharge in stable condition.  Provided referral for rheumatology and comprehensive spine for further evaluation and management.   2108 Chest x-ray obtained with no acute cardiopulmonary findings.  EKG obtained with no findings.   2111 Calcium: 8.7  No hypocalcemia.   2111 TSH 3RD GENERATON: 1.209             HEART Risk Score      Flowsheet Row Most Recent Value   Heart Score Risk Calculator    History 0 Filed at: 09/07/2024 2108   ECG 0 Filed at: 09/07/2024 2108   Age 1 Filed at: 09/07/2024 2108   Risk Factors 1 Filed at: 09/07/2024 2108   Troponin 0 Filed at: 09/07/2024 2108   HEART Score 2 Filed at: 09/07/2024 2108                                  Medical Decision Making  See ED course.    Amount and/or Complexity of Data Reviewed  Labs: ordered. Decision-making details documented in ED Course.  Radiology: ordered.    Risk  Prescription drug management.          Disposition  Final diagnoses:    Bilateral arm pain     Time reflects when diagnosis was documented in both MDM as applicable and the Disposition within this note       Time User Action Codes Description Comment    9/7/2024  9:06 PM Shahab Barron [M79.601,  M79.602] Bilateral arm pain           ED Disposition       ED Disposition   Discharge    Condition   Stable    Date/Time   Sat Sep 7, 2024 2108    Comment   Shilo Olmos discharge to home/self care.                   Follow-up Information       Follow up With Specialties Details Why Contact Info Additional Information    Naseem Corrigan, DO Family Medicine  As needed [address] [place] [address] [address]  [facility] [id number] [phone]       [facility] Physical Therapy Schedule an appointment as soon as possible for a visit   [phone]    [facility] [address] Rheumatology Schedule an appointment as soon as possible for a visit   [address] [place] [id number] [phone] [facility] [address],  [address]  [address] [place] [address]  [id number] [phone]            Patient's Medications   Discharge Prescriptions    No medications on file         PDMP Review       None             ED Provider  Attending physically available and evaluated Shilo Olmos. I managed the patient along with the ED Attending.    Electronically Signed by           Shahab Barron MD  09/07/24 2112       Shahab Barron MD  09/07/24 2149

## 2024-09-08 NOTE — ED ATTENDING ATTESTATION
9/7/2024  I, Cedric Thompson MD, saw and evaluated the patient. I have discussed the patient with the resident/non-physician practitioner and agree with the resident's/non-physician practitioner's findings, Plan of Care, and MDM as documented in the resident's/non-physician practitioner's note, except where noted. All available labs and Radiology studies were reviewed.  I was present for key portions of any procedure(s) performed by the resident/non-physician practitioner and I was immediately available to provide assistance.       At this point I agree with the current assessment done in the Emergency Department.  I have conducted an independent evaluation of this patient a history and physical is as follows:    ED Course         Critical Care Time  Procedures

## 2024-09-08 NOTE — RESULT ENCOUNTER NOTE
I attempted to notify the patient in regards to his abnormal chest x-ray and the need to have a repeat dual-energy subtraction PA i and lateral chest x-ray n 6 weeks.  I was unable to leave a message on his machine.

## 2024-09-09 ENCOUNTER — TELEPHONE (OUTPATIENT)
Dept: PHYSICAL THERAPY | Facility: OTHER | Age: 59
End: 2024-09-09

## 2024-09-09 NOTE — TELEPHONE ENCOUNTER
"Nurse reached out to discuss recent referral entered for  Comprehensive Spine program.    Nurse called and a message stating, \"the wireless customer you are trying to reach is unavailable. Please try your call again later\" after 8 rings or so. Unable to LM.    This was the first attempt to reach the patient.  Referral deferred for a f/u attempt per protocol.  "

## 2024-09-10 ENCOUNTER — NURSE TRIAGE (OUTPATIENT)
Dept: PHYSICAL THERAPY | Facility: OTHER | Age: 59
End: 2024-09-10

## 2024-09-10 DIAGNOSIS — M54.2 ACUTE NECK PAIN: Primary | ICD-10-CM

## 2024-09-10 NOTE — TELEPHONE ENCOUNTER
Additional Information   Negative: Is this related to a work injury?   Negative: Is this related to an MVA?   Negative: Are you currently recieving homecare services?    Background - Initial Assessment  Clinical complaint: Pain is bilat neck, bilat shoulder blades, bilat arms to the elbow, and then bilat wrist and hands. Has HX of chronic low back, leg pain from 2 knee SX's. Has had an epidural years ago. Is not following any doctors at the moment. No numbness or tingling. Started 2 months ago. NKI. Pain is worse in the morning. Pt states his pain is worse with movements, but also says no positions make his pain better. Pain is constant and feels aching. He feels it is arthritic in nature. Seen in ED 9/7/24  Date of onset: 2 months  Frequency of pain: constant  Quality of pain: aching    Protocols used: [facility] Protocol

## 2024-09-10 NOTE — TELEPHONE ENCOUNTER
Pt's spouse Jerry called into comp spine regarding his referral.     I explained to his spouse that we would need to speak to him directly for triage. She will try to have him call within our work hours of 8AM-4PM.     I also suggested if he was unable to call us for triage, they could reach out to the PCP to request any direct referrals for eval.

## 2024-09-10 NOTE — TELEPHONE ENCOUNTER
Additional Information   Negative: Has the patient had unexplained weight loss?   Negative: Does the patient have a fever?   Negative: Is the patient experiencing urine retention?   Negative: Is the patient experiencing acute drop foot or paralysis?   Negative: Has the patient experienced major trauma? (fall from height, high speed collision, direct blow to spine) and is also experiencing nausea, light-headedness, or loss of consciousness?   Negative: Is the patient experiencing blood in sputum?   Negative: Is this a chronic condition?    Protocols used: [facility] Protocol    Comprehensive Sp[facility] Program was reviewed in detail and what we can provide for their neck pain.  Patient is agreeable to being triaged and would like to proceed with Physical Therapy.    Referral was placed for Physical Therapy at the [place] site. Patients information was sent to the  to make evaluation appointment. Patient made aware that the PT office  will be calling to schedule the appointment.  Patient was provided with the phone number to the PT office.    No further questions and/or concerns were voiced by the patient at this time. Patient states understanding of the referral that was placed.    Referral Closed.

## 2024-09-12 ENCOUNTER — EVALUATION (OUTPATIENT)
Dept: PHYSICAL THERAPY | Facility: CLINIC | Age: 59
End: 2024-09-12
Payer: COMMERCIAL

## 2024-09-12 VITALS
OXYGEN SATURATION: 97 % | SYSTOLIC BLOOD PRESSURE: 122 MMHG | HEART RATE: 84 BPM | TEMPERATURE: 97 F | DIASTOLIC BLOOD PRESSURE: 84 MMHG

## 2024-09-12 DIAGNOSIS — M54.2 ACUTE NECK PAIN: ICD-10-CM

## 2024-09-12 PROCEDURE — 97530 THERAPEUTIC ACTIVITIES: CPT

## 2024-09-12 PROCEDURE — 97162 PT EVAL MOD COMPLEX 30 MIN: CPT

## 2024-09-12 NOTE — PROGRESS NOTES
PT Evaluation     Today's date: 2024  Patient name: Shilo Olmos  : 1965  MRN: [id number]  Referring provider: Christopher Victor PT  Dx:   Encounter Diagnosis     ICD-10-CM    1. Acute neck pain  M54.2 Ambulatory referral to PT spine                     Assessment  Impairments: abnormal muscle firing, abnormal or restricted ROM, activity intolerance, impaired physical strength, lacks appropriate home exercise program, pain with function, poor posture  and poor body mechanics    Assessment details: Shilo Olmos is a pleasant 58 y.o. male presents to the comprehensive spine program with signs and symptoms consistent with:   Acute neck pain  Bilateral radiculopathy     Problem List:  1) Adverse neural tension  2) Cervical hypomobility     Comparable signs:  1) rotation   2) lifting arms    he has adverse neural tension, increased neck pain, and hypomobility throughout his t/s and c/s resulting in worry over not knowing what's wrong and fear of not being able to keep active. He had presented to the emergency room due to this ongoing pain and was referred to the comp spine program. He was given a chest xray at ED and has not had any medications at this time These impairments listed above are preventing the patient from participating in functional activity. No further referral appears necessary at this time based upon examination results, and is negative for any red flags. Prognosis is good given HEP compliance and attendance to physical therapy 2x a week.  Positive prognostic indicators include positive attitude toward recovery and good understanding of diagnosis and treatment plan options.  Negative prognostic indicators include chronicity of symptoms, anxiety, high symptom irritability, and obesity.  Patent will benefit from skilled physical therapy at this time to address deficits to improve overall function and return to PLOF. Patient verbalized understanding of POC, HEP, and return  demonstrated HEP. All questions were answered to patients satisfaction.     Please contact me if you have any questions or recommendations. Thank you for the referral and the opportunity to share in Shilo Olmos's care.            Understanding of Dx/Px/POC: good     Prognosis: good    Goals  Impairment Goals 4-6 weeks  In order to improve and maximize function patient will be able to...  - Decrease pain intensity to <2/10  - Improve cervical AROM to >100% throughout  - Centralize symptoms and decrease numbness frequency/duration    Functional Goals 6-8 weeks  In order to improve and maximize function patient will be able to...  - Return to Prior Level of Function with no greater than 2/10 pain   - Increase Functional Status Measure (FOTO) to: >predicted outcomes  - Be independent and compliant with HEP  - Tolerate looking down and computer work for greater than 30 minutes  - Have the ROM necessary for driving and to be able to tolerate driving for >30 minutes.  - Sleep throughout the night without disturbances due to pain   - Decrease headache intensity/frequency/duration to minimal or none.         Plan  Patient would benefit from: skilled physical therapy  Planned modality interventions: cryotherapy, thermotherapy: hydrocollator packs and TENS    Planned therapy interventions: home exercise program, graded exercise, functional ROM exercises, flexibility, body mechanics training, postural training, patient education, therapeutic activities, therapeutic exercise, manual therapy, joint mobilization, neuromuscular re-education, motor coordination training, graded activity, stretching and strengthening    Frequency: 2x week  Duration in weeks: 8  Treatment plan discussed with: patient and PCP        Subjective Evaluation    History of Present Illness  Mechanism of injury: Patient presents to PT today after presenting to the ER for arm pain. He reports that it has been going on for 2-3 months now with bilateral arm  pain. He has tried tylenol, tiger balm and it did not work. Heart attack was negative. He notes no change in balance, pain with lifting. He reports constant pain, with more intense in the morning. No history of neck pain. He reports no ADY with onset of pain. He was taking magnesium and then develop pain. Denies numbness tingling but increase in pain. Denies bowel and bladder issues. No med's were given at ER. Patient drives a dump truck for work.       Difficulty with: lifting,  strength decreased, lifting cup of coffee   Patient Goals  Patient goals for therapy: decreased pain, independence with ADLs/IADLs and return to sport/leisure activities  Patient goal: not to have pain  Pain  Current pain ratin  At best pain ratin  At worst pain rating: 10  Location: shoulder blade and into the bicep and then into wrist and hands  Quality: sharp and dull ache  Alleviating factors: none.    Hand dominance: right      Diagnostic Tests  Abnormal x-ray: chest xray.  Treatments  No previous or current treatments        Objective     Concurrent Complaints  Positive for disturbed sleep (any movement). Negative for dizziness, faints, headaches, nausea/motion sickness, tinnitus, trouble swallowing, difficulty breathing, shortness of breath, respiratory pain and visual change    Postural Observations  Seated posture: fair  Standing posture: fair  Correction of posture: makes symptoms better      Neurological Testing     Sensation   Cervical/Thoracic   Left   Intact: light touch    Right   Intact: light touch    Reflexes   Left   Biceps (C5/C6): normal (2+)  Brachioradialis (C6): normal (2+)  Triceps (C7): normal (2+)  Hunt's reflex: negative    Right   Biceps (C5/C6): normal (2+)  Brachioradialis (C6): normal (2+)  Triceps (C7): normal (2+)  Hunt's reflex: negative    Active Range of Motion   Cervical/Thoracic Spine       Cervical    Flexion: 20 degrees   Extension: 40 degrees      Left lateral flexion: 20 degrees       Right lateral flexion: 25 degrees      Left rotation: 50 degrees  Right rotation: 45 degrees       Thoracic    Flexion:  Restriction level: moderate  Extension:  Restriction level: moderate  Left lateral flexion:  Restriction level: moderate  Right lateral flexion:  Restriction level: moderate  Left rotation:  Restriction level: moderate  Right rotation:  Restriction level: moderate    Joint Play     Hypomobile: C1, C2, C3, C4, C5, C6, C7, T1, T2, T3, T4, T5, T6, T7, T8, T9, T10, T11 and T12   Mechanical Assessment    Cervical    Seated retraction: repeated movements   Pain intensity: better  Pain level: decreased  Seated Extension: repeated movements  Pain intensity: better  Pain level: decreased    Thoracic      Lumbar      Strength/Myotome Testing   Cervical Spine     Left   Neck lateral flexion (C3): 4    Right   Neck lateral flexion (C3): 4    Left Shoulder     Planes of Motion   Extension: 4   Abduction: 4-   External rotation at 0°: 4     Isolated Muscles   Lower trapezius: 4-   Middle trapezius: 4   Serratus anterior: 4     Right Shoulder     Planes of Motion   Extension: 4   Abduction: 4-   External rotation at 0°: 4     Isolated Muscles   Lower trapezius: 4-   Middle trapezius: 4   Serratus anterior: 4     Left Elbow   Flexion: 4+  Extension: 4+    Right Elbow   Flexion: 4+  Extension: 4+    Left Wrist/Hand   Wrist extension: 5  Wrist flexion: 5    Right Wrist/Hand   Wrist extension: 5  Wrist flexion: 5    Tests   Cervical     Left   Positive Spurling's Test A.     Right   Positive Spurling's Test A.     Left Shoulder   Positive ULTT1, ULTT2, ULTT3 and ULTT4.     Right Shoulder   Positive ULTT1, ULTT2, ULTT3 and ULTT4.     General Comments:    Upper quarter screen   Shoulder: unremarkable  Elbow: unremarkable  Hand/wrist: unremarkable  Neuro Exam:     Headaches   Patient reports headaches: No.                POC Expires Auth Status Start Date Expiration Date PT Visit Limit    10/12 Na  9/12  70 PCY    Date 9/12       Used 1       Remaining           Diagnosis:  Bilateral radiculopathy   Precautions:  See chart   Comparable signs 1) Lifting arm  2) Reaching   Primary Impairments: 1) Adverse neural tension  2) Hypomobility    Patient Goals    Manual Therapy 9/12        C/s side glide         Supine retract w/ ext          CTJ         T/s mobs         Re-evaluation          Exercise Diary          Therapeutic Exercise         UBE for ROM         Retract          C/s snags         T/s ext                                    Neuromuscular Re-education         DNF                                                                         Therapeutic Activities         Education  POC, diagnosis, expecations                                            Modalities

## 2024-09-17 NOTE — RESULT ENCOUNTER NOTE
I spoke to the patient in regards to his chest x-ray results.  He has been a follow-up with his family physician and have a repeat study 6 weeks from his ER visit to ensure resolution of this opacity in his right mid lung.

## 2024-09-18 ENCOUNTER — OFFICE VISIT (OUTPATIENT)
Dept: PHYSICAL THERAPY | Facility: CLINIC | Age: 59
End: 2024-09-18
Payer: COMMERCIAL

## 2024-09-18 DIAGNOSIS — M54.2 ACUTE NECK PAIN: Primary | ICD-10-CM

## 2024-09-18 PROCEDURE — 97110 THERAPEUTIC EXERCISES: CPT

## 2024-09-18 PROCEDURE — 97530 THERAPEUTIC ACTIVITIES: CPT

## 2024-09-18 PROCEDURE — 97112 NEUROMUSCULAR REEDUCATION: CPT

## 2024-09-18 NOTE — PROGRESS NOTES
"Daily Note     Today's date: 2024  Patient name: Shilo Olmos  : 1965  MRN: [id number]  Referring provider: Christopher Victor, MONICA  Dx:   Encounter Diagnosis     ICD-10-CM    1. Acute neck pain  M54.2                      Subjective: Patient reports that he had increased soreness when waking up with cramping in his hand       Objective: See treatment diary below      Assessment: Tolerated treatment well. Patient demonstrated fatigue post treatment, exhibited good technique with therapeutic exercises, and would benefit from continued PT. Hypomobility noted throughout session. Patient complained of arthritis pain throughout session but no reports of numbness and tingling. Pain mostly in the shoulder blades to this date. HEP updated for home.       Plan: Continue per plan of care.  Progress treatment as tolerated.         POC Expires Auth Status Start Date Expiration Date PT Visit Limit    10/12 Na    70 PCY   Date        Used 1       Remaining           Diagnosis:  Bilateral radiculopathy   Precautions:  See chart   Comparable signs 1) Lifting arm  2) Reaching   Primary Impairments: 1) Adverse neural tension  2) Hypomobility    Patient Goals    Manual Therapy        C/s side glide         Supine retract w/ ext          CTJ         T/s mobs         Re-evaluation          Exercise Diary          Therapeutic Exercise         UBE for ROM  2/2       Retract   W/ head turns 2x5  W/ retraction       C/s snags         T/s ext  1x5 w/o chin tuck 2x5 w/ CT        Open books  10x10\" ea       Rhomboid st  10x10\"        Nerve glides   Med/ ulnar 2x10       Neuromuscular Re-education         DNF                                                                         Therapeutic Activities         Education  POC, diagnosis, expecations                                            Modalities                              "

## 2024-09-25 ENCOUNTER — OFFICE VISIT (OUTPATIENT)
Dept: PHYSICAL THERAPY | Facility: CLINIC | Age: 59
End: 2024-09-25
Payer: COMMERCIAL

## 2024-09-25 DIAGNOSIS — M54.2 ACUTE NECK PAIN: Primary | ICD-10-CM

## 2024-09-25 PROCEDURE — 97112 NEUROMUSCULAR REEDUCATION: CPT

## 2024-09-25 PROCEDURE — 97530 THERAPEUTIC ACTIVITIES: CPT

## 2024-09-25 PROCEDURE — 97110 THERAPEUTIC EXERCISES: CPT

## 2024-09-25 NOTE — PROGRESS NOTES
"Daily Note     Today's date: 2024  Patient name: Shilo Olmos  : 1965  MRN: [id number]  Referring provider: Christopher Victor, PT  Dx:   Encounter Diagnosis     ICD-10-CM    1. Acute neck pain  M54.2                      Subjective: Patient reports that his pain is the same and has some arthritis pain       Objective: See treatment diary below      Assessment: Tolerated treatment well. Patient demonstrated fatigue post treatment, exhibited good technique with therapeutic exercises, and would benefit from continued PT. Continue with mobility exercises due to increased stiffness noted. He needed cueing for form but difficulty achieving end range to this date.       Plan: Continue per plan of care.  Progress treatment as tolerated.         POC Expires Auth Status Start Date Expiration Date PT Visit Limit    10/12 Na    70 PCY   Date        Used 1       Remaining           Diagnosis:  Bilateral radiculopathy   Precautions:  See chart   Comparable signs 1) Lifting arm  2) Reaching   Primary Impairments: 1) Adverse neural tension  2) Hypomobility    Patient Goals    Manual Therapy       C/s side glide         Supine retract w/ ext          CTJ         T/s mobs         Re-evaluation          Exercise Diary          Therapeutic Exercise         UBE for ROM  2/2 2/2      Retract   W/ head turns 2x5  W/ retraction 10x      C/s snags         T/s ext  1x5 w/o chin tuck 2x5 w/ CT  20x5\"      Open books  10x10\" ea 20x5\"      Rhomboid st  10x10\"        Nerve glides   Med/ ulnar 2x10 Med/ulnar 20x meli      Neuromuscular Re-education         DNF                                                                         Therapeutic Activities         Education  POC, diagnosis, expecations                                            Modalities                              "

## 2024-09-30 ENCOUNTER — OFFICE VISIT (OUTPATIENT)
Dept: PHYSICAL THERAPY | Facility: CLINIC | Age: 59
End: 2024-09-30
Payer: COMMERCIAL

## 2024-09-30 ENCOUNTER — OFFICE VISIT (OUTPATIENT)
Dept: FAMILY MEDICINE CLINIC | Facility: CLINIC | Age: 59
End: 2024-09-30
Payer: COMMERCIAL

## 2024-09-30 ENCOUNTER — LAB (OUTPATIENT)
Dept: LAB | Facility: AMBULARY SURGERY CENTER | Age: 59
End: 2024-09-30
Payer: COMMERCIAL

## 2024-09-30 VITALS
BODY MASS INDEX: 31.84 KG/M2 | HEIGHT: 71 IN | HEART RATE: 70 BPM | WEIGHT: 227.4 LBS | OXYGEN SATURATION: 98 % | SYSTOLIC BLOOD PRESSURE: 120 MMHG | DIASTOLIC BLOOD PRESSURE: 94 MMHG | TEMPERATURE: 97.2 F | RESPIRATION RATE: 16 BRPM

## 2024-09-30 DIAGNOSIS — Z12.2 SCREENING FOR LUNG CANCER: ICD-10-CM

## 2024-09-30 DIAGNOSIS — R91.1 LUNG NODULE: ICD-10-CM

## 2024-09-30 DIAGNOSIS — I10 ESSENTIAL HYPERTENSION: ICD-10-CM

## 2024-09-30 DIAGNOSIS — M54.2 ACUTE NECK PAIN: Primary | ICD-10-CM

## 2024-09-30 DIAGNOSIS — R25.2 LEG CRAMPING: ICD-10-CM

## 2024-09-30 DIAGNOSIS — Z12.5 PROSTATE CANCER SCREENING: ICD-10-CM

## 2024-09-30 DIAGNOSIS — M25.50 ARTHRALGIA, UNSPECIFIED JOINT: ICD-10-CM

## 2024-09-30 DIAGNOSIS — Z72.0 TOBACCO ABUSE: ICD-10-CM

## 2024-09-30 DIAGNOSIS — M25.50 ARTHRALGIA, UNSPECIFIED JOINT: Primary | ICD-10-CM

## 2024-09-30 LAB
ALBUMIN SERPL BCG-MCNC: 3.9 G/DL (ref 3.5–5)
ALP SERPL-CCNC: 61 U/L (ref 34–104)
ALT SERPL W P-5'-P-CCNC: 12 U/L (ref 7–52)
ANA SER QL IA: NEGATIVE
ANION GAP SERPL CALCULATED.3IONS-SCNC: 11 MMOL/L (ref 4–13)
AST SERPL W P-5'-P-CCNC: 17 U/L (ref 13–39)
BILIRUB SERPL-MCNC: 0.32 MG/DL (ref 0.2–1)
BUN SERPL-MCNC: 12 MG/DL (ref 5–25)
CALCIUM SERPL-MCNC: 9.5 MG/DL (ref 8.4–10.2)
CHLORIDE SERPL-SCNC: 100 MMOL/L (ref 96–108)
CK SERPL-CCNC: 52 U/L (ref 39–308)
CO2 SERPL-SCNC: 26 MMOL/L (ref 21–32)
CREAT SERPL-MCNC: 0.86 MG/DL (ref 0.6–1.3)
CRP SERPL QL: 24.6 MG/L
ERYTHROCYTE [SEDIMENTATION RATE] IN BLOOD: 59 MM/HOUR (ref 0–19)
FERRITIN SERPL-MCNC: 13 NG/ML (ref 24–336)
GFR SERPL CREATININE-BSD FRML MDRD: 94 ML/MIN/1.73SQ M
GLUCOSE SERPL-MCNC: 96 MG/DL (ref 65–140)
IRON SATN MFR SERPL: 6 % (ref 15–50)
IRON SERPL-MCNC: 24 UG/DL (ref 50–212)
POTASSIUM SERPL-SCNC: 4.3 MMOL/L (ref 3.5–5.3)
PROT SERPL-MCNC: 7.2 G/DL (ref 6.4–8.4)
PSA SERPL-MCNC: 0.61 NG/ML (ref 0–4)
SODIUM SERPL-SCNC: 137 MMOL/L (ref 135–147)
TIBC SERPL-MCNC: 421 UG/DL (ref 250–450)
UIBC SERPL-MCNC: 397 UG/DL (ref 155–355)

## 2024-09-30 PROCEDURE — 99213 OFFICE O/P EST LOW 20 MIN: CPT | Performed by: FAMILY MEDICINE

## 2024-09-30 PROCEDURE — 85652 RBC SED RATE AUTOMATED: CPT

## 2024-09-30 PROCEDURE — 83550 IRON BINDING TEST: CPT

## 2024-09-30 PROCEDURE — 97112 NEUROMUSCULAR REEDUCATION: CPT

## 2024-09-30 PROCEDURE — G0103 PSA SCREENING: HCPCS

## 2024-09-30 PROCEDURE — 36415 COLL VENOUS BLD VENIPUNCTURE: CPT

## 2024-09-30 PROCEDURE — 86430 RHEUMATOID FACTOR TEST QUAL: CPT

## 2024-09-30 PROCEDURE — 82550 ASSAY OF CK (CPK): CPT

## 2024-09-30 PROCEDURE — 80053 COMPREHEN METABOLIC PANEL: CPT

## 2024-09-30 PROCEDURE — 83540 ASSAY OF IRON: CPT

## 2024-09-30 PROCEDURE — 86140 C-REACTIVE PROTEIN: CPT

## 2024-09-30 PROCEDURE — 82728 ASSAY OF FERRITIN: CPT

## 2024-09-30 PROCEDURE — 86038 ANTINUCLEAR ANTIBODIES: CPT

## 2024-09-30 RX ORDER — PREDNISONE 10 MG/1
TABLET ORAL
Qty: 50 TABLET | Refills: 0 | Status: SHIPPED | OUTPATIENT
Start: 2024-09-30 | End: 2024-10-20

## 2024-09-30 NOTE — PROGRESS NOTES
"Daily Note     Today's date: 2024  Patient name: Shilo Olmos  : 1965  MRN: [id number]  Referring provider: Christopher Victor, MONICA  Dx:   Encounter Diagnosis     ICD-10-CM    1. Acute neck pain  M54.2             Subjective: Patient reports pain as 6-7/10 in his neck  and down both his arms upon arrival.  Had some blood work today and MD prescribed his steroids.       Objective: See treatment diary below      Assessment: Tolerated treatment well. Patient demonstrated fatigue post treatment, exhibited good technique with therapeutic exercises, and would benefit from continued PT.  Patient reports some increased pain with exercises. Patient with difficulty describing symptoms, but instructed in difference between tightness and pain. Educated in performing exercises in pain-free range as able.       Plan: Continue per plan of care.  Progress treatment as tolerated.         POC Expires Auth Status Start Date Expiration Date PT Visit Limit    10/12 Na    70 PCY   Date        Used 1       Remaining           Diagnosis:  Bilateral radiculopathy   Precautions:  See chart   Comparable signs 1) Lifting arm  2) Reaching   Primary Impairments: 1) Adverse neural tension  2) Hypomobility    Patient Goals    Manual Therapy      C/s side glide         Supine retract w/ ext          CTJ         T/s mobs         Re-evaluation          Exercise Diary          Therapeutic Exercise         UBE for ROM  2/2 2/2 2/2     Retract   W/ head turns 2x5  W/ retraction 10x 10x     C/s snags    10x ea     T/s ext  1x5 w/o chin tuck 2x5 w/ CT  20x5\" 5'', 10x     Open books  10x10\" ea 20x5\" 20''x5     Rhomboid st  10x10\"   20''x5     Doorway pec stretch    attempted     Pillow rotation    10x ea     Nerve glides   Med/ ulnar 2x10 Med/ulnar 20x meli Med/ulnar 10x meli     Neuromuscular Re-education         DNF                                                                         Therapeutic Activities      "    Education  POC, diagnosis, expecations                                            Modalities

## 2024-09-30 NOTE — PROGRESS NOTES
Ambulatory Visit  Name: Shilo Olmos      : 1965      MRN: [id number]  Encounter Provider: Naseem Corrigan DO  Encounter Date: 2024   Encounter department: [facility]    Assessment & Plan  Arthralgia, unspecified joint  -Presents today for follow-up after recently being seen in the ER for multiple joint pains.  Workup in the ER revealed normal CK, negative Lyme's test, normal thyroid.  EKG was negative.  He was treated with IV fluids and provided follow-up with physical therapy.  -He has been participating in physical therapy and neck however is not getting any relief.  -Continues with significant aching multiple joints to include shoulders, elbows, hands to his legs as well.  Denies any rash.  No recent insect bites.  Works as a  very difficult to do his job he has to sit for long periods of time he gets very stiff and achy.  -Will do blood work as below for autoimmune screening possible polymyalgia rheumatica.  -Will start on steroid taper.  -Discussed following up with referral to rheumatology for any further recommendations.  -Will follow-up with blood work results.  -Recommend continuing physical therapy to help with stretching and joint mobility.  -Follow-up as needed.  Orders:    Comprehensive metabolic panel; Future    C-reactive protein; Future    Sedimentation rate, automated; Future    RF Screen w/ Reflex to Titer; Future    MICHAEL Screen w/ Reflex to Titer/Pattern; Future    predniSONE 10 mg tablet; Take 4 tablets (40 mg total) by mouth daily for 5 days, THEN 3 tablets (30 mg total) daily for 5 days, THEN 2 tablets (20 mg total) daily for 5 days, THEN 1 tablet (10 mg total) daily for 5 days.    CK; Future    Screening for lung cancer  I discussed with him that he is a candidate for lung cancer CT screening.     The following Shared Decision-Making points were covered:  Benefits of screening were discussed, including the rates of reduction in death from  lung cancer and other causes.  Harms of screening were reviewed, including false positive tests, radiation exposure levels, risks of invasive procedures, risks of complications of screening, and risk of overdiagnosis.  I counseled on the importance of adherence to annual lung cancer LDCT screening, impact of co-morbidities, and ability or willingness to undergo diagnosis and treatment.  I counseled on the importance of maintaining abstinence as a former smoker or was counseled on the importance of smoking cessation if a current smoker    Review of Eligibility Criteria: He meets all of the criteria for Lung Cancer Screening.   He is 59 y.o.   He has 20 pack year tobacco history and is a current smoker or has quit within the past 15 years  He presents no signs or symptoms of lung cancer    After discussion, the patient decided to elect lung cancer screening.    Orders:    CT lung screening program; Future    Lung nodule    Orders:    CT lung screening program; Future    Tobacco abuse    Orders:    CT lung screening program; Future         History of Present Illness     Jalil is a 59-year-old male who presents today for follow-up of ongoing multijoint pains.  Notes he was seen in the ER on 9/7/2024 when he believes this started.  He had been having upper back, bilateral shoulder and arm pains which was affecting his driving.  He denies any known exacerbating events.  Denies any trauma or injury.  Denies any significant illness.  Evaluation in the ER was largely negative.  Patient was provided referral to comprehensive spine program and follow-up with rheumatology.  He has been doing physical therapy since his ER visit and notes having very minimal relief.  He does get some improvement after therapy and his neck range of motion however general joint aches have not been improved.  He did have Lyme's testing done which was negative.  He denies any other insect bites or rashes.  Denies any fevers, chills, nausea, vomiting,  diarrhea.  Denies any decrease in oral intake.  Denies any unwanted weight loss or night sweats.  He has used Tylenol and Advil on occasion with some relief.  He works as a  and has been able to work however is finding it more difficult to do so.      Review of Systems   Constitutional:  Negative for chills and fever.   HENT:  Negative for ear pain and sore throat.    Eyes:  Negative for pain and visual disturbance.   Respiratory:  Negative for cough and shortness of breath.    Cardiovascular:  Negative for chest pain and palpitations.   Gastrointestinal:  Negative for abdominal pain and vomiting.   Genitourinary:  Negative for dysuria and hematuria.   Musculoskeletal:  Positive for arthralgias (Upper back, shoulders, elbows, hands, bilateral leg pain) and neck pain. Negative for back pain, joint swelling and neck stiffness.   Skin:  Negative for color change and rash.   Neurological:  Negative for seizures and syncope.   Psychiatric/Behavioral:  Negative for agitation, dysphoric mood and sleep disturbance. The patient is not nervous/anxious.    All other systems reviewed and are negative.    Past Medical History:   Diagnosis Date    Hypertension     Psoriasis      Past Surgical History:   Procedure Laterality Date    CLOSURE WOUND Left 1/19/2024    Procedure: CLOSURE LEFT LOWER EXTREMITY WOUND, WASHOUT;  Surgeon: Parker Valdez MD;  Location: [facility];  Service: Vascular    EVACUATION OF HEMATOMA Left 1/17/2024    Procedure: EVACUATION/ DRAINAGE HEMATOMA, EXPLORATION ON LEFT LEG BYPASS, CONTROLL OF BLEEDING;  Surgeon: Mariana Vargas DO;  Location: [facility];  Service: Vascular    KNEE SURGERY Bilateral     VT BYPASS W/VEIN FEMORAL-POPLITEAL Left 1/16/2024    Procedure: BYPASS FEMORAL-POPLITEAL -Left above knee popliteal to below knee popliteal artery bypass WITH REVERSE SAPHENOUS VEIN GRAFT; EXCLUSION OF POPLITEAL ANEURYSM;  Surgeon: Parker Valdez MD;  Location: [facility];  Service: Vascular     Family History   Problem Relation Age of Onset    Hypertension Mother     No Known Problems Father      Social History     Tobacco Use    Smoking status: Former     Current packs/day: 0.25     Average packs/day: 0.3 packs/day for 32.0 years (8.0 ttl pk-yrs)     Types: Cigarettes    Smokeless tobacco: Never    Tobacco comments:     1 PPD x 15 years - As per [facility]    Vaping Use    Vaping status: Never Used   Substance and Sexual Activity    Alcohol use: Yes     Comment: rare social use one time per month    Drug use: Never    Sexual activity: Yes     Partners: Female     Current Outpatient Medications on File Prior to Visit   Medication Sig    acetaminophen (TYLENOL) 325 mg tablet Take 3 tablets (975 mg total) by mouth every 8 (eight) hours    apixaban (Eliquis) 5 mg Take 1 tablet (5 mg total) by mouth 2 (two) times a day    aspirin 81 mg chewable tablet Chew 1 tablet (81 mg total) daily    Elastic Bandages & Supports (Medical Compression Thigh High) MISC Use daily 15-20mmHg    losartan (COZAAR) 100 MG tablet Take 1 tablet (100 mg total) by mouth daily    Omega 3-6-9 Fatty Acids (OMEGA 3-6-9 PO) Take by mouth    calcium carbonate (TUMS) 500 mg chewable tablet Chew 1 tablet (500 mg total) daily as needed for indigestion or heartburn (Patient not taking: Reported on 6/4/2024)    Cholecalciferol (Vitamin D) 50 MCG (2000 UT) tablet Take 2,000 Units by mouth daily (Patient not taking: Reported on 10/30/2023)    rosuvastatin (CRESTOR) 10 MG tablet Take 1 tablet (10 mg total) by mouth daily (Patient not taking: Reported on 9/30/2024)    sodium picosulfate, magnesium oxide, citric acid (Clenpiq) oral solution Take 175 mL (1 bottle) the evening before the colonoscopy, between 5 PM and 9 PM, followed by a second 175 mL bottle 5 hours before the colonoscopy. (Patient not taking: Reported on 9/30/2024)     No Known Allergies  Immunization History   Administered Date(s) Administered    COVID-19 MODERNA  "VACC 0.5 ML IM 10/12/2021, 11/09/2021     Objective     /94 (BP Location: Left arm, Patient Position: Sitting, Cuff Size: Standard)   Pulse 70   Temp (!) 97.2 °F (36.2 °C) (Tympanic)   Resp 16   Ht 5' 11\" (1.803 m)   Wt 103 kg (227 lb 6.4 oz)   SpO2 98%   BMI 31.72 kg/m²     Physical Exam  Vitals and nursing note reviewed.   Constitutional:       General: He is not in acute distress.     Appearance: Normal appearance.   HENT:      Head: Normocephalic and atraumatic.      Right Ear: Tympanic membrane and external ear normal.      Left Ear: Tympanic membrane and external ear normal.      Nose: Nose normal.      Mouth/Throat:      Mouth: Mucous membranes are moist.   Eyes:      Extraocular Movements: Extraocular movements intact.      Conjunctiva/sclera: Conjunctivae normal.      Pupils: Pupils are equal, round, and reactive to light.   Cardiovascular:      Rate and Rhythm: Normal rate and regular rhythm.      Pulses: Normal pulses.      Heart sounds: Normal heart sounds. No murmur heard.  Pulmonary:      Effort: Pulmonary effort is normal.      Breath sounds: Normal breath sounds. No wheezing, rhonchi or rales.   Abdominal:      General: Bowel sounds are normal.      Palpations: Abdomen is soft.      Tenderness: There is no abdominal tenderness. There is no guarding.   Musculoskeletal:         General: Normal range of motion.      Cervical back: Normal range of motion.      Right lower leg: No edema.      Left lower leg: No edema.      Comments: Generalized tenderness upper back and deltoid.     Neck: Full range of motion with some mild decrease in rotation due to discomfort.  Negative Spurling's    Shoulders: Mild decrease in internal rotation.  Pain with all provocative movements.    Hands: No evidence of joint swelling or tenderness.  4+/5 strength due to stiffness and pain     Lymphadenopathy:      Cervical: No cervical adenopathy.   Skin:     General: Skin is warm.      Capillary Refill: Capillary " refill takes less than 2 seconds.   Neurological:      General: No focal deficit present.      Mental Status: He is alert and oriented to person, place, and time.   Psychiatric:         Mood and Affect: Mood normal.         Behavior: Behavior normal.

## 2024-10-01 DIAGNOSIS — D50.9 IRON DEFICIENCY ANEMIA, UNSPECIFIED IRON DEFICIENCY ANEMIA TYPE: Primary | ICD-10-CM

## 2024-10-01 DIAGNOSIS — E78.00 PURE HYPERCHOLESTEROLEMIA: ICD-10-CM

## 2024-10-01 LAB — RHEUMATOID FACT SER QL LA: NEGATIVE

## 2024-10-01 RX ORDER — ROSUVASTATIN CALCIUM 10 MG/1
10 TABLET, COATED ORAL DAILY
Qty: 90 TABLET | Refills: 1 | Status: SHIPPED | OUTPATIENT
Start: 2024-10-01

## 2024-10-01 RX ORDER — FERROUS SULFATE 324(65)MG
324 TABLET, DELAYED RELEASE (ENTERIC COATED) ORAL
Qty: 180 TABLET | Refills: 1 | Status: SHIPPED | OUTPATIENT
Start: 2024-10-01

## 2024-10-01 NOTE — RESULT ENCOUNTER NOTE
Damian Jimenes,    Your labs came back.  The lab markers for inflammation are both significantly elevated.  Lets continue with the prednisone as we discussed.  We also checked your iron levels and they are low -I would recommend we start an iron supplement.  I am going to send you a prescription for iron to be taken twice daily with food.  Everything else looked good.  Please keep me posted on how you are doing.  I will reach out when I see the follow-up CT scan of your lungs.    Thank you  -Dr. WEAVER

## 2024-10-02 ENCOUNTER — APPOINTMENT (OUTPATIENT)
Dept: PHYSICAL THERAPY | Facility: CLINIC | Age: 59
End: 2024-10-02
Payer: COMMERCIAL

## 2024-10-07 ENCOUNTER — OFFICE VISIT (OUTPATIENT)
Dept: PHYSICAL THERAPY | Facility: CLINIC | Age: 59
End: 2024-10-07
Payer: COMMERCIAL

## 2024-10-07 DIAGNOSIS — M54.2 ACUTE NECK PAIN: Primary | ICD-10-CM

## 2024-10-07 PROCEDURE — 97112 NEUROMUSCULAR REEDUCATION: CPT | Performed by: PHYSICAL THERAPIST

## 2024-10-07 PROCEDURE — 97110 THERAPEUTIC EXERCISES: CPT | Performed by: PHYSICAL THERAPIST

## 2024-10-07 NOTE — PROGRESS NOTES
"Daily Note     Today's date: 10/7/2024  Patient name: Shilo Olmos  : 1965  MRN: [id number]  Referring provider: Christopher Victor PT  Dx:   Encounter Diagnosis     ICD-10-CM    1. Acute neck pain  M54.2                      Subjective: Patient reports that he started a steroid pack for about 1 week prescribed by his PCP. He feels about 50% better. He has some shoulder blade pain.       Objective: See treatment diary below      Assessment: Tolerated treatment well. Patient would benefit from continued PT. He required cues for correct form with SNAGs for extension. He did demonstrate improved mobility throughout session. He is able to perform cervical retraction without any verbal cues for form.       Plan: Continue per plan of care.        POC Expires Auth Status Start Date Expiration Date PT Visit Limit    10/12 Na    70 PCY   Date  10/7   Used 1 2 3 4 5   Remaining [phone]      Diagnosis:  Bilateral radiculopathy   Precautions:  See chart   Comparable signs 1) Lifting arm  2) Reaching   Primary Impairments: 1) Adverse neural tension  2) Hypomobility    Patient Goals    Manual Therapy  10/7    C/s side glide         Supine retract w/ ext          CTJ         T/s mobs         Re-evaluation          Exercise Diary          Therapeutic Exercise         UBE for ROM  2/2 2/2 2/2 2/2    Retract   W/ head turns 2x5  W/ retraction 10x 10x 10x    C/s snags    10x ea 10xea rot  10xea ext    T/s ext  1x5 w/o chin tuck 2x5 w/ CT  20x5\" 5'', 10x 5\"x15    Open books  10x10\" ea 20x5\" 20''x5 5\"x20    Rhomboid st  10x10\"   20''x5 5\"x20    Doorway pec stretch    attempted     Pillow rotation    10x ea 10x ea    Nerve glides   Med/ ulnar 2x10 Med/ulnar 20x meli Med/ulnar 10x meli Median/ulnar  10xea    Neuromuscular Re-education         DNF                                                                         Therapeutic Activities         Education  POC, diagnosis, " expecations                                            Modalities

## 2024-10-09 ENCOUNTER — APPOINTMENT (OUTPATIENT)
Dept: PHYSICAL THERAPY | Facility: CLINIC | Age: 59
End: 2024-10-09
Payer: COMMERCIAL

## 2024-10-09 ENCOUNTER — ANESTHESIA EVENT (OUTPATIENT)
Dept: ANESTHESIOLOGY | Facility: HOSPITAL | Age: 59
End: 2024-10-09

## 2024-10-09 ENCOUNTER — ANESTHESIA (OUTPATIENT)
Dept: ANESTHESIOLOGY | Facility: HOSPITAL | Age: 59
End: 2024-10-09

## 2024-10-14 ENCOUNTER — OFFICE VISIT (OUTPATIENT)
Dept: PHYSICAL THERAPY | Facility: CLINIC | Age: 59
End: 2024-10-14
Payer: COMMERCIAL

## 2024-10-14 DIAGNOSIS — M54.2 ACUTE NECK PAIN: Primary | ICD-10-CM

## 2024-10-14 PROCEDURE — 97110 THERAPEUTIC EXERCISES: CPT

## 2024-10-14 PROCEDURE — 97112 NEUROMUSCULAR REEDUCATION: CPT

## 2024-10-14 NOTE — PROGRESS NOTES
"Daily Note     Today's date: 10/14/2024  Patient name: Shilo Olmos  : 1965  MRN: [id number]  Referring provider: Christopher Victor, PT  Dx:   Encounter Diagnosis     ICD-10-CM    1. Acute neck pain  M54.2           Subjective: Patient reports feeling much better, states steroids have helped a lot. Still has some arthritis is his hands in the morning.      Objective: See treatment diary below      Assessment: Tolerated treatment well. Patient would benefit from continued PT. Initiated additional scapular stabilization and strengthening exercises. Patient able to demo good form and technique. Able to demo effort and able to complete exercises without pain.       Plan: Continue per plan of care.        POC Expires Auth Status Start Date Expiration Date PT Visit Limit    10/12 Na    70 PCY   Date 10/14 9/18 9/25 9/30 10/7   Used 6 2 3 4 5   Remaining [phone]      Diagnosis:  Bilateral radiculopathy   Precautions:  See chart   Comparable signs 1) Lifting arm  2) Reaching   Primary Impairments: 1) Adverse neural tension  2) Hypomobility    Patient Goals    Manual Therapy 9/12 9/18 9/25 9/30 10/7 10/14   C/s side glide         Supine retract w/ ext          CTJ         T/s mobs         Re-evaluation          Exercise Diary          Therapeutic Exercise         UBE for ROM  2/2 2/2 2/2 2/2 2/2   Retract   W/ head turns 2x5  W/ retraction 10x 10x 10x 10x   C/s snags    10x ea 10xea rot  10xea ext 10xea rot  10xea ext   T/s ext  1x5 w/o chin tuck 2x5 w/ CT  20x5\" 5'', 10x 5\"x15 5''x15   Open books  10x10\" ea 20x5\" 20''x5 5\"x20 5'', 10x   Rhomboid st  10x10\"   20''x5 5\"x20 20''x5   Doorway pec stretch    attempted     Pillow rotation    10x ea 10x ea 10x   Nerve glides   Med/ ulnar 2x10 Med/ulnar 20x meli Med/ulnar 10x meli Median/ulnar  10xea    Neuromuscular Re-education         DNF       5'', 10x   No monies      RTB 2x10   hitchers      RTB 2x10   Rows/LPD      BTB 2x10                                "        Therapeutic Activities         Education  POC, diagnosis, expecations                                            Modalities

## 2024-10-16 ENCOUNTER — EVALUATION (OUTPATIENT)
Dept: PHYSICAL THERAPY | Facility: CLINIC | Age: 59
End: 2024-10-16
Payer: COMMERCIAL

## 2024-10-16 DIAGNOSIS — M54.2 ACUTE NECK PAIN: Primary | ICD-10-CM

## 2024-10-16 PROCEDURE — 97140 MANUAL THERAPY 1/> REGIONS: CPT

## 2024-10-16 PROCEDURE — 97530 THERAPEUTIC ACTIVITIES: CPT

## 2024-10-16 NOTE — PROGRESS NOTES
PT Re-Evaluation  and PT Discharge    Today's date: 10/16/2024  Patient name: Shilo Olmos  : 1965  MRN: [id number]  Referring provider: Christopher Victor, MONICA  Dx:   Encounter Diagnosis     ICD-10-CM    1. Acute neck pain  M54.2                      Assessment    Assessment details: RE 10/16/24 Patient has made good progress with PT and has met functional PT goals at this time. Patient will be discharged from skilled PT services and will continue to make progress with I HEP. Patients HEP was reviewed in order to ensure compliance and success at home, in which exercise bands and printouts were given. We will be available if the patient needs physical therapy in the future. Patient was given an opportunity to ask questions. All questions were answered to patients satisfaction.     IE:Shilo Olmos is a pleasant 58 y.o. male presents to the comprehensive spine program with signs and symptoms consistent with:   Acute neck pain  Bilateral radiculopathy     Problem List:  1) Adverse neural tension  2) Cervical hypomobility     Comparable signs:  1) rotation   2) lifting arms    he has adverse neural tension, increased neck pain, and hypomobility throughout his t/s and c/s resulting in worry over not knowing what's wrong and fear of not being able to keep active. He had presented to the emergency room due to this ongoing pain and was referred to the comp spine program. He was given a chest xray at ED and has not had any medications at this time These impairments listed above are preventing the patient from participating in functional activity. No further referral appears necessary at this time based upon examination results, and is negative for any red flags. Prognosis is good given HEP compliance and attendance to physical therapy 2x a week.  Positive prognostic indicators include positive attitude toward recovery and good understanding of diagnosis and treatment plan options.  Negative prognostic  indicators include chronicity of symptoms, anxiety, high symptom irritability, and obesity.  Patent will benefit from skilled physical therapy at this time to address deficits to improve overall function and return to PLOF. Patient verbalized understanding of POC, HEP, and return demonstrated HEP. All questions were answered to patients satisfaction.     Please contact me if you have any questions or recommendations. Thank you for the referral and the opportunity to share in Shilo Olmos's care.            Understanding of Dx/Px/POC: good     Prognosis: good    Goals  Impairment Goals 4-6 weeks MET  In order to improve and maximize function patient will be able to...  - Decrease pain intensity to <2/10  - Improve cervical AROM to >100% throughout  - Centralize symptoms and decrease numbness frequency/duration    Functional Goals 6-8 weeks MET  In order to improve and maximize function patient will be able to...  - Return to Prior Level of Function with no greater than 2/10 pain   - Increase Functional Status Measure (FOTO) to: >predicted outcomes  - Be independent and compliant with HEP  - Tolerate looking down and computer work for greater than 30 minutes  - Have the ROM necessary for driving and to be able to tolerate driving for >30 minutes.  - Sleep throughout the night without disturbances due to pain   - Decrease headache intensity/frequency/duration to minimal or none.         Plan    Treatment plan discussed with: patient and PCP        Subjective Evaluation    History of Present Illness  Mechanism of injury: RE Patient presents to PT today with improvements in arm pain. He notes that lifting has been better. He has been on a steroid for 2 weeks which has calmed down his pain immensely Some discomfort in the morning. He notes that he is 70% improved at this point. He notes improvement in mobility to this date. He is notes no numbness and tingling and most of his pain is arthritic this point.   IE:Patient  presents to PT today after presenting to the ER for arm pain. He reports that it has been going on for 2-3 months now with bilateral arm pain. He has tried tylenol, tiger balm and it did not work. Heart attack was negative. He notes no change in balance, pain with lifting. He reports constant pain, with more intense in the morning. No history of neck pain. He reports no ADY with onset of pain. He was taking magnesium and then develop pain. Denies numbness tingling but increase in pain. Denies bowel and bladder issues. No med's were given at ER. 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       Difficulty with: lifting,  strength decreased, lifting cup of coffee   Patient Goals  Patient goals for therapy: decreased pain, independence with ADLs/IADLs and return to sport/leisure activities  Patient goal: not to have pain (met)  Pain  Current pain ratin  At best pain ratin  At worst pain ratin  Location: shoulder blades  Quality: sharp and dull ache  Alleviating factors: none.    Hand dominance: right      Diagnostic Tests  Abnormal x-ray: chest xray.  Treatments  No previous or current treatments        Objective     Concurrent Complaints  Negative for disturbed sleep (any movement), dizziness, faints, headaches, nausea/motion sickness, tinnitus, trouble swallowing, difficulty breathing, shortness of breath, respiratory pain and visual change    Postural Observations  Seated posture: fair  Standing posture: fair  Correction of posture: makes symptoms better      Neurological Testing     Sensation   Cervical/Thoracic   Left   Intact: light touch    Right   Intact: light touch    Reflexes   Left   Biceps (C5/C6): normal (2+)  Brachioradialis (C6): normal (2+)  Triceps (C7): normal (2+)  Hunt's reflex: negative    Right   Biceps (C5/C6): normal (2+)  Brachioradialis (C6): normal (2+)  Triceps (C7): normal (2+)  Hunt's reflex: negative    Active Range of Motion   Cervical/Thoracic Spine        Cervical    Flexion: 20 degrees   Extension: 40 degrees      Left lateral flexion: 35 degrees      Right lateral flexion: 40 degrees      Left rotation: 65 degrees  Right rotation: 55 degrees       Thoracic    Flexion:  Restriction level: minimal  Extension:  Restriction level: moderate  Left lateral flexion:  Restriction level: moderate  Right lateral flexion:  Restriction level: moderate  Left rotation:  Restriction level: minimal  Right rotation:  Restriction level: minimal    Joint Play   Joints within functional limits: C1, C2, C3, C4, C5, C6, C7, T1, T2, T3, T4, T5, T6, T7, T8 and T9   Mechanical Assessment    Cervical    Seated retraction: repeated movements   Pain intensity: better  Pain level: decreased  Seated Extension: repeated movements  Pain intensity: better  Pain level: decreased    Thoracic      Lumbar      Strength/Myotome Testing   Cervical Spine     Left   Neck lateral flexion (C3): 4    Right   Neck lateral flexion (C3): 4+    Left Shoulder     Planes of Motion   Extension: 4   Abduction: 4   External rotation at 0°: 4     Isolated Muscles   Lower trapezius: 4   Middle trapezius: 4   Serratus anterior: 4     Right Shoulder     Planes of Motion   Extension: 4+   Abduction: 4+   External rotation at 0°: 4     Isolated Muscles   Lower trapezius: 4   Middle trapezius: 4   Serratus anterior: 4     Left Elbow   Flexion: 4+  Extension: 4+    Right Elbow   Flexion: 4+  Extension: 4+    Left Wrist/Hand   Wrist extension: 5  Wrist flexion: 5    Right Wrist/Hand   Wrist extension: 5  Wrist flexion: 5    Tests   Cervical     Left   Negative Spurling's Test A.     Right   Negative Spurling's Test A.     Left Shoulder   Negative ULTT1, ULTT2, ULTT3 and ULTT4.     Right Shoulder   Negative ULTT1, ULTT2, ULTT3 and ULTT4.     General Comments:    Upper quarter screen   Shoulder: unremarkable  Elbow: unremarkable  Hand/wrist: unremarkable  Neuro Exam:     Headaches   Patient reports headaches: No.  "               POC Expires Auth Status Start Date Expiration Date PT Visit Limit    10/12 Na  9/12  70 PCY   Date 10/14 9/18 9/25 9/30 10/7   Used 6 2 3 4 5   Remaining [phone]      Diagnosis:  Bilateral radiculopathy   Precautions:  See chart   Comparable signs 1) Lifting arm  2) Reaching   Primary Impairments: 1) Adverse neural tension  2) Hypomobility    Patient Goals    Manual Therapy 10/16 9/18 9/25 9/30 10/7 10/14   C/s side glide         Supine retract w/ ext          CTJ         T/s mobs         Re-evaluation  SP        Exercise Diary          Therapeutic Exercise         UBE for ROM 2/2 2/2 2/2 2/2 2/2 2/2   Retract  20xea W/ head turns 2x5  W/ retraction 10x 10x 10x 10x   C/s snags 20x ea   10x ea 10xea rot  10xea ext 10xea rot  10xea ext   T/s ext  1x5 w/o chin tuck 2x5 w/ CT  20x5\" 5'', 10x 5\"x15 5''x15   Open books  10x10\" ea 20x5\" 20''x5 5\"x20 5'', 10x   Rhomboid st  10x10\"   20''x5 5\"x20 20''x5   Doorway pec stretch    attempted     Pillow rotation    10x ea 10x ea 10x   Nerve glides   Med/ ulnar 2x10 Med/ulnar 20x meli Med/ulnar 10x meli Median/ulnar  10xea    Neuromuscular Re-education         DNF       5'', 10x   No monies      RTB 2x10   hitchers      RTB 2x10   Rows/LPD      BTB 2x10                                       Therapeutic Activities         Education                                              Modalities                              "

## 2024-10-22 ENCOUNTER — TELEPHONE (OUTPATIENT)
Age: 59
End: 2024-10-22

## 2024-10-22 NOTE — TELEPHONE ENCOUNTER
Pt's wife calling for time. Advised not assigned as yet, will call between 2-6PM for time. Wife confirmed understanding.

## 2024-10-23 ENCOUNTER — ANESTHESIA (OUTPATIENT)
Dept: GASTROENTEROLOGY | Facility: AMBULARY SURGERY CENTER | Age: 59
End: 2024-10-23
Payer: COMMERCIAL

## 2024-10-23 ENCOUNTER — HOSPITAL ENCOUNTER (OUTPATIENT)
Dept: GASTROENTEROLOGY | Facility: AMBULARY SURGERY CENTER | Age: 59
Setting detail: OUTPATIENT SURGERY
Discharge: HOME | End: 2024-10-23
Payer: COMMERCIAL

## 2024-10-23 VITALS
RESPIRATION RATE: 18 BRPM | TEMPERATURE: 97.2 F | HEIGHT: 71 IN | WEIGHT: 230 LBS | OXYGEN SATURATION: 96 % | BODY MASS INDEX: 32.2 KG/M2 | DIASTOLIC BLOOD PRESSURE: 93 MMHG | HEART RATE: 64 BPM | SYSTOLIC BLOOD PRESSURE: 131 MMHG

## 2024-10-23 DIAGNOSIS — Z86.0100 HX OF COLONIC POLYP: ICD-10-CM

## 2024-10-23 PROCEDURE — 88305 TISSUE EXAM BY PATHOLOGIST: CPT | Performed by: STUDENT IN AN ORGANIZED HEALTH CARE EDUCATION/TRAINING PROGRAM

## 2024-10-23 PROCEDURE — 45380 COLONOSCOPY AND BIOPSY: CPT | Performed by: INTERNAL MEDICINE

## 2024-10-23 PROCEDURE — 45385 COLONOSCOPY W/LESION REMOVAL: CPT | Performed by: INTERNAL MEDICINE

## 2024-10-23 RX ORDER — PROPOFOL 10 MG/ML
INJECTION, EMULSION INTRAVENOUS AS NEEDED
Status: DISCONTINUED | OUTPATIENT
Start: 2024-10-23 | End: 2024-10-23

## 2024-10-23 RX ORDER — LIDOCAINE HYDROCHLORIDE 10 MG/ML
INJECTION, SOLUTION EPIDURAL; INFILTRATION; INTRACAUDAL; PERINEURAL AS NEEDED
Status: DISCONTINUED | OUTPATIENT
Start: 2024-10-23 | End: 2024-10-23

## 2024-10-23 RX ORDER — SODIUM CHLORIDE, SODIUM LACTATE, POTASSIUM CHLORIDE, CALCIUM CHLORIDE 600; 310; 30; 20 MG/100ML; MG/100ML; MG/100ML; MG/100ML
INJECTION, SOLUTION INTRAVENOUS CONTINUOUS PRN
Status: DISCONTINUED | OUTPATIENT
Start: 2024-10-23 | End: 2024-10-23

## 2024-10-23 RX ORDER — PROPOFOL 10 MG/ML
INJECTION, EMULSION INTRAVENOUS CONTINUOUS PRN
Status: DISCONTINUED | OUTPATIENT
Start: 2024-10-23 | End: 2024-10-23

## 2024-10-23 RX ADMIN — PROPOFOL 120 MCG/KG/MIN: 10 INJECTION, EMULSION INTRAVENOUS at 09:18

## 2024-10-23 RX ADMIN — PROPOFOL 100 MG: 10 INJECTION, EMULSION INTRAVENOUS at 09:16

## 2024-10-23 RX ADMIN — PROPOFOL 100 MG: 10 INJECTION, EMULSION INTRAVENOUS at 09:18

## 2024-10-23 RX ADMIN — SODIUM CHLORIDE, SODIUM LACTATE, POTASSIUM CHLORIDE, CALCIUM CHLORIDE: 600; 310; 30; 20 INJECTION, SOLUTION INTRAVENOUS at 08:46

## 2024-10-23 RX ADMIN — LIDOCAINE HYDROCHLORIDE 50 MG: 10 INJECTION, SOLUTION EPIDURAL; INFILTRATION; INTRACAUDAL; PERINEURAL at 09:16

## 2024-10-23 NOTE — H&P
"History and Physical -  [facility]  Shilo Olmos 59 y.o. male MRN: [id number]        HPI: 59-year-old male with history of colon polyps.  Regular bowel movements.    Historical Information   Past Medical History:   Diagnosis Date    Hypertension     Psoriasis      Past Surgical History:   Procedure Laterality Date    CLOSURE WOUND Left 1/19/2024    Procedure: CLOSURE LEFT LOWER EXTREMITY WOUND, WASHOUT;  Surgeon: Parker Valdez MD;  Location: [facility];  Service: Vascular    EVACUATION OF HEMATOMA Left 1/17/2024    Procedure: EVACUATION/ DRAINAGE HEMATOMA, EXPLORATION ON LEFT LEG BYPASS, CONTROLL OF BLEEDING;  Surgeon: Mariana Vargas DO;  Location: [facility];  Service: Vascular    KNEE SURGERY Bilateral     ID BYPASS W/VEIN FEMORAL-POPLITEAL Left 1/16/2024    Procedure: BYPASS FEMORAL-POPLITEAL -Left above knee popliteal to below knee popliteal artery bypass WITH REVERSE SAPHENOUS VEIN GRAFT; EXCLUSION OF POPLITEAL ANEURYSM;  Surgeon: Parker Valdez MD;  Location: [facility];  Service: Vascular     Social History   Social History     Substance and Sexual Activity   Alcohol Use Yes    Comment: rare social use one time per month     Social History     Substance and Sexual Activity   Drug Use Never     Social History     Tobacco Use   Smoking Status Former    Current packs/day: 0.25    Average packs/day: 0.3 packs/day for 32.0 years (8.0 ttl pk-yrs)    Types: Cigarettes   Smokeless Tobacco Never   Tobacco Comments    1 PPD x 15 years - As per Wendi      Family History   Problem Relation Age of Onset    Hypertension Mother     No Known Problems Father        Meds/Allergies     Not in a hospital admission.    No Known Allergies    Objective     Blood pressure (!) 147/102, pulse 68, temperature (!) 96.9 °F (36.1 °C), temperature source Temporal, resp. rate 16, height 5' 11\" (1.803 m), weight 104 kg (230 lb), SpO2 96%.    Physical Exam:    Chest- CTA  Heart- " RRR  Abdomen- NT/ND  Extremities- No edema    ASSESSMENT:     History of colon polyps    PLAN:    Colonoscopy

## 2024-10-23 NOTE — ANESTHESIA PREPROCEDURE EVALUATION
Procedure:  COLONOSCOPY    Relevant Problems   CARDIO   (+) Abdominal aortic aneurysm (AAA) 3.0 cm to 5.5 cm in diameter in male (HCC)   (+) Acute deep vein thrombosis (DVT) of tibial vein of right lower extremity (HCC)   (+) Essential hypertension   (+) Pure hypercholesterolemia      MUSCULOSKELETAL   (+) Gout of left foot   (+) Primary osteoarthritis of left knee        Left Ventricle: Left ventricular cavity size is normal. The left ventricular ejection fraction is % by visual estimation. Systolic function is normal. Although no diagnostic regional wall motion abnormality was identified, this possibility cannot be completely excluded on the basis of this study.    Right Ventricle: Right ventricular cavity size is normal. Systolic function is normal.    Aorta: The aortic root is 4.00 cm. The ascending aorta is 4.2 cm.  These appear to be at upper limits of normal when indexed for body surface area.  (1.8 and 1.9 cm/m² respectively)    Prior TTE study available for comparison. Prior study date: 9/15/2023. No significant changes noted compared to the prior study.    EF 60% 9/15/2023  Physical Exam    Airway    Mallampati score: III  TM Distance: >3 FB  Neck ROM: full     Dental       Cardiovascular  Rhythm: regular, No weak pulses    Pulmonary   No stridor    Other Findings        Anesthesia Plan  ASA Score- 3     Anesthesia Type- IV sedation with anesthesia with ASA Monitors.         Additional Monitors:     Airway Plan:            Plan Factors-    Chart reviewed.   Existing labs reviewed. Patient summary reviewed.                  Induction- intravenous.    Postoperative Plan-         Informed Consent- Anesthetic plan and risks discussed with patient.  I personally reviewed this patient with the CRNA. Discussed and agreed on the Anesthesia Plan with the CRNA..

## 2024-10-23 NOTE — ANESTHESIA POSTPROCEDURE EVALUATION
Post-Op Assessment Note    CV Status:  Stable  Pain Score: 0    Pain management: adequate       Mental Status:  Alert and awake   Hydration Status:  Euvolemic   PONV Controlled:  Controlled   Airway Patency:  Patent     Post Op Vitals Reviewed: Yes    No anethesia notable event occurred.    Staff: CRNA           Last Filed PACU Vitals:  Vitals Value Taken Time   Temp     Pulse     BP     Resp     SpO2         Modified Song:  Activity: 2 (10/23/2024  9:34 AM)  Respiration: 2 (10/23/2024  9:34 AM)  Circulation: 2 (10/23/2024  9:34 AM)  Consciousness: 1 (10/23/2024  9:34 AM)  Oxygen Saturation: 2 (10/23/2024  9:34 AM)  Modified Song Score: 9 (10/23/2024  9:34 AM)

## 2024-10-25 NOTE — PROGRESS NOTES
Rheumatology Initial Outpatient Visit  Name: Shilo Olmos      : 1965      MRN: [id number]  Encounter Provider: Samaria Greer MD  Encounter Date: 10/28/2024   Encounter department: [facility]    Assessment & Plan  PMR (polymyalgia rheumatica) (HCC)  59-year-old male who presents for further evaluation of bilateral shoulder, hip, hand and wrist pain and stiffness.  He reports symptoms are worse in the morning with improvement with activity and excellent response to steroids.  Prior workup showed elevated inflammatory markers and knee aspiration in 2023 showed inflammatory fluid with CPPD crystals.  Most likely diagnosis is polymyalgia rheumatica versus less likely atypical RA.  Discussed nature of diagnosis.  Will plan to obtain repeat inflammatory markers and serologies for rheumatoid arthritis to ensure this is not an atypical presentation of rheumatoid disease.  Discussed first-line treatment for PMR is prednisone with prolonged taper.  We reviewed signs and symptoms of giant cell arteritis to be aware of. 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 Reviewed CTA abd with run off - no clear findings of vasculitis. Will need to monitor for any new or worsening vessel changes as although it is uncommon, patients can develop extracranial GCA without intracranial involvement/symptoms. Follow-up 3 months.    Orders:    Ambulatory Referral to Rheumatology    Sedimentation rate, automated; Future    C-reactive protein; Future    predniSONE 5 mg tablet; Take 4 tablets (20 mg total) by mouth daily for 14 days, THEN 3 tablets (15 mg total) daily for 14 days, THEN 2 tablets (10 mg total) daily.    Cyclic citrul peptide antibody, IgG; Future    RF Screen w/ Reflex to Titer; Future    Screening for osteoporosis  Recommend screening for osteoporosis given plan for prolonged steroid use.  Orders:    Vitamin D 25  hydroxy; Future    DXA bone density spine hip and pelvis; Future      History of Present Illness     Shilo Olmos is a 59 y.o. male who presents for evaluation of joint pain.  Patient reports last year he was having claudicatory symptoms in his legs and was found to have bilateral popliteal aneurysms with the right side thrombosed.  This resulted in having a bypass on the left earlier this year.  He reports within 2 to 3 months after the surgery, his symptoms of arthralgias began.  He reports that initially started in his shoulders and biceps with pain and stiffness and myalgias in his legs.  Initially, he attributed to just general aches and pains but the symptoms continued to persist.  He reports currently he has pain and stiffness of his bilateral shoulders radiating down towards the elbows, bilateral hands into the wrist, and bilateral hips into the upper thighs.  He reports pain is significantly worse in the morning and he is unable to raise his arms overhead or  things with his hands.  As the day goes on his symptoms do tend to get a bit better as he loosens up but has not totally resolved.  At some point, he did receive a taper of prednisone to treat his symptoms which significantly improved his symptoms.  However once he stopped the steroids the symptoms returned within a few weeks    Of note, last year he was also diagnosed with gout of the foot due to foot pain.  Although he reports he is not sure if this was gout.  Last year he also developed a left knee effusion which was aspirated showing inflammatory fluid with CPPD crystals.      Review of Systems  Complete ROS conducted as per HPI. In addition, denies:  Fever  Headache  Scalp tenderness  Jaw claudication  Chest pain  SOB  Vision change  Joint issues other than noted above    + Psoriasis    Objective     /86 (BP Location: Left arm, Patient Position: Sitting, Cuff Size: Adult)   Pulse 64   Temp 97.6 °F (36.4 °C) (Tympanic)   Resp 16    "Ht 5' 11\" (1.803 m)   Wt 104 kg (230 lb)   SpO2 98%   BMI 32.08 kg/m²     Physical Exam  Physical Exam  Constitutional: well appearing, no acute distress  HEENT: normocephalic, sclera clear, no visible oral or nasal ulcers  Neck: supple, no palpable cervical adenopathy  CV: regular rate and rhythm, no murmur  Pulm: normal respiratory effort, lungs clear to auscultation b/l  Skin: no rashes, no skin thickening  Extremities: warm and well perfused, no edema  MSK:  - Observation: Normal  - Palpation: No specific tenderness to palpation  - Synovitis: Absent  - Effusion: Absent  - ROM: Restricted active shoulder range of motion secondary to pain    Labs and Imaging  I have personally reviewed pertinent labs and imaging.     ESR 59  CRP 24.6    RF, MICHAEL negative  "

## 2024-10-28 ENCOUNTER — CONSULT (OUTPATIENT)
Age: 59
End: 2024-10-28
Payer: COMMERCIAL

## 2024-10-28 ENCOUNTER — APPOINTMENT (OUTPATIENT)
Dept: LAB | Facility: CLINIC | Age: 59
End: 2024-10-28
Payer: COMMERCIAL

## 2024-10-28 VITALS
HEIGHT: 71 IN | DIASTOLIC BLOOD PRESSURE: 86 MMHG | OXYGEN SATURATION: 98 % | WEIGHT: 230 LBS | TEMPERATURE: 97.6 F | HEART RATE: 64 BPM | BODY MASS INDEX: 32.2 KG/M2 | SYSTOLIC BLOOD PRESSURE: 130 MMHG | RESPIRATION RATE: 16 BRPM

## 2024-10-28 DIAGNOSIS — Z13.820 SCREENING FOR OSTEOPOROSIS: ICD-10-CM

## 2024-10-28 DIAGNOSIS — M35.3 PMR (POLYMYALGIA RHEUMATICA) (HCC): ICD-10-CM

## 2024-10-28 DIAGNOSIS — Z13.820 SCREENING FOR OSTEOPOROSIS: Primary | ICD-10-CM

## 2024-10-28 DIAGNOSIS — E78.00 PURE HYPERCHOLESTEROLEMIA: ICD-10-CM

## 2024-10-28 LAB
25(OH)D3 SERPL-MCNC: 27.3 NG/ML (ref 30–100)
CHOLEST SERPL-MCNC: 179 MG/DL
CRP SERPL QL: 42.7 MG/L
ERYTHROCYTE [SEDIMENTATION RATE] IN BLOOD: 54 MM/HOUR (ref 0–19)
HDLC SERPL-MCNC: 38 MG/DL
LDLC SERPL CALC-MCNC: 125 MG/DL (ref 0–100)
PSA SERPL-MCNC: 0.39 NG/ML (ref 0–4)
RHEUMATOID FACT SER QL LA: NEGATIVE
TRIGL SERPL-MCNC: 81 MG/DL

## 2024-10-28 PROCEDURE — 99204 OFFICE O/P NEW MOD 45 MIN: CPT | Performed by: STUDENT IN AN ORGANIZED HEALTH CARE EDUCATION/TRAINING PROGRAM

## 2024-10-28 PROCEDURE — 36415 COLL VENOUS BLD VENIPUNCTURE: CPT

## 2024-10-28 PROCEDURE — 85652 RBC SED RATE AUTOMATED: CPT

## 2024-10-28 PROCEDURE — 88305 TISSUE EXAM BY PATHOLOGIST: CPT | Performed by: STUDENT IN AN ORGANIZED HEALTH CARE EDUCATION/TRAINING PROGRAM

## 2024-10-28 PROCEDURE — G0103 PSA SCREENING: HCPCS

## 2024-10-28 PROCEDURE — 80061 LIPID PANEL: CPT

## 2024-10-28 PROCEDURE — 82306 VITAMIN D 25 HYDROXY: CPT

## 2024-10-28 PROCEDURE — 86140 C-REACTIVE PROTEIN: CPT

## 2024-10-28 PROCEDURE — 86200 CCP ANTIBODY: CPT

## 2024-10-28 PROCEDURE — 86430 RHEUMATOID FACTOR TEST QUAL: CPT

## 2024-10-28 RX ORDER — PREDNISONE 5 MG/1
TABLET ORAL
Qty: 158 TABLET | Refills: 0 | Status: SHIPPED | OUTPATIENT
Start: 2024-10-28 | End: 2024-12-25

## 2024-10-28 NOTE — PATIENT INSTRUCTIONS
Get blood work done.   Get DXA done.   Start taking prednisone according to taper on bottle.   When you are about 2 weeks in to the 10mg daily, send me a message so we can send a new refill for prednisone. You will plan to taper by 1mg every month.         Your symptoms are most consistent with a disease called Polymyalgia Rheumatica (PMR). The treatment for this is a prolonged prednisone course, which we will taper down over the course of many months. This disease can take 1-3 years to run its course.    You will do the steroid taper as outlined below:     Four 5 mg tablets a day (20 mg) for two weeks, then  Three 5 mg tablets a day (15 mg) for two weeks, then  Two 5 mg tablets a day (10 mg) for four weeks, then  One 5 mg tablet AND four 1 mg tablets a day (9 mg) for four weeks, then  One 5 mg tablet AND three 1 mg tablets a day (8 mg) for four weeks, then  One 5 mg tablet AND two 1 mg tablets a day (7 mg) for four weeks, then  One 5 mg tablet AND one 1 mg tablet a day (6 mg) for four weeks, then  One 5 mg tablet a day (5 mg) until you receive further instructions for me.        There is a disease called Giant Cell Arteritis (GCA) that can be associated with PMR, that can cause blindness if left untreated. Please look out for the following warning signs:  -New type of headache  -Sudden changes in your vision, including blurred vision or feelings of a dark curtain coming down over your eye  -Jaw pain, aching, or quick tiredness with chewing.     If you experience any of the above symptoms, let me know immediately!     If you develop any signs of an infection, please see your PCP immediately, as steroids like prednisone can increase your risk of severe infection.

## 2024-10-29 LAB — CCP AB SER IA-ACNC: 0.8

## 2024-11-05 NOTE — PHYSICAL THERAPY NOTE
Physical Therapy Cancellation Note         01/19/24 1440   PT Last Visit   PT Visit Date 01/19/24   Note Type   Note Type Cancelled Session   Cancel Reasons Patient to operating room  (PT will continue to follow)       STEFFI ALBERTO PT, DPT                                                                                      Bexarotene Counseling:  I discussed with the patient the risks of bexarotene including but not limited to hair loss, dry lips/skin/eyes, liver abnormalities, hyperlipidemia, pancreatitis, depression/suicidal ideation, photosensitivity, drug rash/allergic reactions, hypothyroidism, anemia, leukopenia, infection, cataracts, and teratogenicity.  Patient understands that they will need regular blood tests to check lipid profile, liver function tests, white blood cell count, thyroid function tests and pregnancy test if applicable.

## 2024-11-09 DIAGNOSIS — I82.441 ACUTE DEEP VEIN THROMBOSIS (DVT) OF TIBIAL VEIN OF RIGHT LOWER EXTREMITY (HCC): ICD-10-CM

## 2024-12-04 ENCOUNTER — HOSPITAL ENCOUNTER (OUTPATIENT)
Facility: HOSPITAL | Age: 59
Discharge: HOME | End: 2024-12-04
Attending: STUDENT IN AN ORGANIZED HEALTH CARE EDUCATION/TRAINING PROGRAM
Payer: COMMERCIAL

## 2024-12-04 ENCOUNTER — HOSPITAL ENCOUNTER (OUTPATIENT)
Dept: CT IMAGING | Facility: HOSPITAL | Age: 59
Discharge: HOME | End: 2024-12-04
Attending: FAMILY MEDICINE
Payer: COMMERCIAL

## 2024-12-04 ENCOUNTER — RESULTS FOLLOW-UP (OUTPATIENT)
Age: 59
End: 2024-12-04

## 2024-12-04 VITALS — HEIGHT: 71 IN | BODY MASS INDEX: 31.64 KG/M2 | WEIGHT: 226 LBS

## 2024-12-04 DIAGNOSIS — Z13.820 SCREENING FOR OSTEOPOROSIS: ICD-10-CM

## 2024-12-04 DIAGNOSIS — Z12.2 SCREENING FOR LUNG CANCER: ICD-10-CM

## 2024-12-04 DIAGNOSIS — R91.1 LUNG NODULE: ICD-10-CM

## 2024-12-04 DIAGNOSIS — Z72.0 TOBACCO ABUSE: ICD-10-CM

## 2024-12-04 PROCEDURE — 77080 DXA BONE DENSITY AXIAL: CPT

## 2024-12-05 ENCOUNTER — OFFICE VISIT (OUTPATIENT)
Dept: FAMILY MEDICINE CLINIC | Facility: CLINIC | Age: 59
End: 2024-12-05
Payer: COMMERCIAL

## 2024-12-05 VITALS
DIASTOLIC BLOOD PRESSURE: 92 MMHG | RESPIRATION RATE: 16 BRPM | HEART RATE: 78 BPM | WEIGHT: 226.6 LBS | HEIGHT: 72 IN | SYSTOLIC BLOOD PRESSURE: 112 MMHG | TEMPERATURE: 97.2 F | OXYGEN SATURATION: 96 % | BODY MASS INDEX: 30.69 KG/M2

## 2024-12-05 DIAGNOSIS — M10.9 GOUT OF LEFT FOOT, UNSPECIFIED CAUSE, UNSPECIFIED CHRONICITY: ICD-10-CM

## 2024-12-05 DIAGNOSIS — Z00.00 ANNUAL PHYSICAL EXAM: ICD-10-CM

## 2024-12-05 DIAGNOSIS — E78.00 PURE HYPERCHOLESTEROLEMIA: ICD-10-CM

## 2024-12-05 DIAGNOSIS — I10 ESSENTIAL HYPERTENSION: Primary | ICD-10-CM

## 2024-12-05 DIAGNOSIS — I71.40 ABDOMINAL AORTIC ANEURYSM (AAA) 3.0 CM TO 5.5 CM IN DIAMETER IN MALE (HCC): ICD-10-CM

## 2024-12-05 DIAGNOSIS — M54.50 LUMBAR BACK PAIN: ICD-10-CM

## 2024-12-05 DIAGNOSIS — M35.3 PMR (POLYMYALGIA RHEUMATICA) (HCC): ICD-10-CM

## 2024-12-05 PROCEDURE — 99214 OFFICE O/P EST MOD 30 MIN: CPT | Performed by: FAMILY MEDICINE

## 2024-12-05 PROCEDURE — 99396 PREV VISIT EST AGE 40-64: CPT | Performed by: FAMILY MEDICINE

## 2024-12-05 RX ORDER — METHOCARBAMOL 500 MG/1
500 TABLET, FILM COATED ORAL 3 TIMES DAILY
Qty: 60 TABLET | Refills: 0 | Status: SHIPPED | OUTPATIENT
Start: 2024-12-05

## 2024-12-05 NOTE — ASSESSMENT & PLAN NOTE
Stable on last blood work.  Continue off statin for now as was having significant myalgias.  Continue following with cardiology.  Orders:    Lipid Panel with Direct LDL reflex; Future

## 2024-12-05 NOTE — PATIENT INSTRUCTIONS
"Patient Education     Back exercises   The Basics   Written by the doctors and editors at [facility], [place]   Why do I need to do back exercises? -- Back exercises can help ease back pain and might help prevent future back pain. Long term, it is important to strengthen the muscles in your lower back, buttocks, and belly. These are your \"core muscles.\" Stretching exercises are also important to keep your muscles flexible.  Below are some stretching and strengthening exercises that might help you. Other forms of movement can help ease or prevent back pain, too. For example, some people like to walk, do aerobic exercise, or do yoga or penny chi. The most important thing is to move your body. Your doctor, nurse, or physical therapist can help you find different types of activity that work for you.  What stretching exercises should I do? -- Below are some examples of stretching exercises. Warm up your muscles before stretching to help prevent injury. To warm up, you can walk, jog, or cycle for a few minutes.  Start by repeating each of these stretches 2 to 3 times. Try to hold each stretch for 5 to 10 seconds, and try to do the stretches 2 to 3 times each day. Breathe slowly and deeply as you do the exercises. Never bounce when doing stretches.   Cat-cow stretch (figure 1) - Start on all fours on the floor, with your hands under your shoulders, knees under your hips, and your back flat. First, tuck your chin and tighten your stomach muscles as you round your back (like a \"cat\"). Hold the stretch for about 10 seconds. Rest for a few seconds as you flatten your back. Next, lift your chin and let your belly and lower back sink toward the floor (like a \"cow\"). Hold the stretch for about 10 seconds.   Single knee-to-chest stretches (figure 2) ? While lying on your back, bend your knees with your feet flat on the floor. Pull 1 knee toward your chest until you feel a stretch in your lower back and buttock area. Lower, and repeat with the " other knee. If you have knee problems, pull your knee up by grabbing the back of your thigh instead of the front of your knee. You can also do this exercise by grabbing both knees at the same time.   Lower trunk rotations (figure 3) ? While lying on your back, bend your knees with your feet flat on the floor. Keep your knees and ankles together, and then drop them to 1 side. Keep both of your shoulders touching the floor until you feel a stretch in the muscles at the side of the back. Repeat on the other side.   Lower back stretches seated (figure 4) ? Sit in a chair with your feet spread about shoulder width apart. Then, lean forward until you feel a stretch in your lower back.  What strengthening exercises should I do? -- Below are some examples of strengthening exercises.  Start by doing each exercise 2 to 3 times. Work up to doing each exercise 10 times. Hold each exercise for 3 to 5 seconds, and try to do the exercises 2 to 3 times each day. Do all exercises slowly.   Shoulder blade squeezes (figure 5) ? Pinch your shoulder blades together on your upper back, and hold 3 to 5 seconds. You can also do these 1 side at a time. Sit with good posture, and make sure that your shoulders do not rise up when you do this exercise. Relax.   Pelvic tilts (figure 6) ? Lie on your back with your knees bent and feet flat on the floor. Tighten your stomach muscles, and press your lower back down to the floor. Relax. You should be able to breathe comfortably during this exercise.   Hip lifts (figure 7) ? Lie on your back with your knees bent and feet flat on the floor. Tighten your stomach muscles, keep your back flat, and lift your buttocks off of the floor. Relax. You should feel this in your buttocks, not in your lower back.  What else should I know?    Exercise, including stretching, might be slightly uncomfortable. But you should not have sharp or severe pain. If you do get severe pain, stop what you are doing. If severe  "pain continues, call your doctor or nurse.   Do not hold your breath when exercising. If you tend to hold your breath, try counting out loud when exercising. If any exercise bothers you, stop right away.   Always warm up before exercising. Warm muscles stretch much easier than cool muscles. Stretching cool muscles can lead to injury.   Doing exercises before a meal can be a good way to get into a routine.  All topics are updated as new evidence becomes available and our peer review process is complete.  This topic retrieved from [employer] on: Apr 03, 2024.  Topic 918821 Version 2.0  Release: 32.2.4 - C32.92  © 2024 UpToDate, Inc. and/or its affiliates. All rights reserved.  figure 1: Cat-cow stretch     Start on all fours on the floor, with hands under your shoulders, knees under your hips, and your back flat. First, tuck your chin and tighten your stomach muscles as you round your back (like a \"cat\"). Hold the stretch for about 10 seconds. Rest for a few seconds as you flatten your back. Next, lift your chin and let your belly and lower back sink toward the floor (like a \"cow\"). Hold the stretch for about 10 seconds.  Graphic [id number] Version 1.0  figure 2: Single knee-to-chest stretch     Lie on your back, bend your knees, and have your feet flat on the floor. Pull 1 knee toward your chest until you feel a stretch in your lower back and buttock area. Repeat with the other knee. If you have knee problems, pull your knee up by grabbing the back of your thigh instead of the front of your knee. You can also do this exercise by grabbing both knees at the same time.  Graphic [id number] Version 1.0  figure 3: Lower trunk rotation     While lying on your back, bend your knees and have your feet flat on the floor. Keep your legs together and then drop them to 1 side. Keep both of your shoulders touching the floor until you feel a stretch in the muscles at the side of the back. Repeat on the other side.  Graphic [id number] Version " 1.0  figure 4: Lower back stretch     Sit in a chair with your feet spread about shoulder width apart. Then, lean forward until you feel a stretch in your lower back.  Graphic [id number] Version 1.0  figure 5: Shoulder blade squeezes     Pinch your shoulder blades together on your upper back and hold for 3 to 5 seconds. Make sure that you are sitting with good posture and that your shoulders do not raise up when you do this exercise. Relax.  Graphic [id number] Version 1.0  figure 6: Pelvic tilts     Lie on your back with your knees bent and feet flat on the floor. Tighten your stomach muscles and press your lower back down to the floor. Relax.  Graphic [id number] Version 1.0  figure 7: Hip lifts     Lie on your back with your knees bent and feet flat on the floor. Tighten your stomach muscles and lift your buttocks off of the floor. Relax.  Graphic [id number] Version 1.0  Consumer Information Use and Disclaimer   Disclaimer: This generalized information is a limited summary of diagnosis, treatment, and/or medication information. It is not meant to be comprehensive and should be used as a tool to help the user understand and/or assess potential diagnostic and treatment options. It does NOT include all information about conditions, treatments, medications, side effects, or risks that may apply to a specific patient. It is not intended to be medical advice or a substitute for the medical advice, diagnosis, or treatment of a health care provider based on the health care provider's examination and assessment of a patient's specific and unique circumstances. Patients must speak with a health care provider for complete information about their health, medical questions, and treatment options, including any risks or benefits regarding use of medications. This information does not endorse any treatments or medications as safe, effective, or approved for treating a specific patient. UpToDate, Inc. and its affiliates disclaim any warranty or  "liability relating to this information or the use thereof.The use of this information is governed by the Terms of Use, available at https://www.wolterskluwer.com/en/know/clinical-effectiveness-terms. 2024© UpToDate, Inc. and its affiliates and/or licensors. All rights reserved.  Copyright   © 2024 UpToDate, Inc. and/or its affiliates. All rights reserved.        Patient Education     Routine physical for adults   The Basics   Written by the doctors and editors at [employer]   What is a physical? -- A physical is a routine visit, or \"check-up,\" with your doctor. You might also hear it called a \"wellness visit\" or \"preventive visit.\"  During each visit, the doctor will:   Ask about your physical and mental health   Ask about your habits, behaviors, and lifestyle   Do an exam   Give you vaccines if needed   Talk to you about any medicines you take   Give advice about your health   Answer your questions  Getting regular check-ups is an important part of taking care of your health. It can help your doctor find and treat any problems you have. But it's also important for preventing health problems.  A routine physical is different from a \"sick visit.\" A sick visit is when you see a doctor because of a health concern or problem. Since physicals are scheduled ahead of time, you can think about what you want to ask the doctor.  How often should I get a physical? -- It depends on your age and health. In general, for people age 21 years and older:   If you are younger than 50 years, you might be able to get a physical every 3 years.   If you are 50 years or older, your doctor might recommend a physical every year.  If you have an ongoing health condition, like diabetes or high blood pressure, your doctor will probably want to see you more often.  What happens during a physical? -- In general, each visit will include:   Physical exam - The doctor or nurse will check your height, weight, heart rate, and blood pressure. They will " "also look at your eyes and ears. They will ask about how you are feeling and whether you have any symptoms that bother you.   Medicines - It's a good idea to bring a list of all the medicines you take to each doctor visit. Your doctor will talk to you about your medicines and answer any questions. Tell them if you are having any side effects that bother you. You should also tell them if you are having trouble paying for any of your medicines.   Habits and behaviors - This includes:   Your diet   Your exercise habits   Whether you smoke, drink alcohol, or use drugs   Whether you are sexually active   Whether you feel safe at home  Your doctor will talk to you about things you can do to improve your health and lower your risk of health problems. They will also offer help and support. For example, if you want to quit smoking, they can give you advice and might prescribe medicines. If you want to improve your diet or get more physical activity, they can help you with this, too.   Lab tests, if needed - The tests you get will depend on your age and situation. For example, your doctor might want to check your:   Cholesterol   Blood sugar   Iron level   Vaccines - The recommended vaccines will depend on your age, health, and what vaccines you already had. Vaccines are very important because they can prevent certain serious or deadly infections.   Discussion of screening - \"Screening\" means checking for diseases or other health problems before they cause symptoms. Your doctor can recommend screening based on your age, risk, and preferences. This might include tests to check for:   Cancer, such as breast, prostate, cervical, ovarian, colorectal, prostate, lung, or skin cancer   Sexually transmitted infections, such as chlamydia and gonorrhea   Mental health conditions like depression and anxiety  Your doctor will talk to you about the different types of screening tests. They can help you decide which screenings to have. They " can also explain what the results might mean.   Answering questions - The physical is a good time to ask the doctor or nurse questions about your health. If needed, they can refer you to other doctors or specialists, too.  Adults older than 65 years often need other care, too. As you get older, your doctor will talk to you about:   How to prevent falling at home   Hearing or vision tests   Memory testing   How to take your medicines safely   Making sure that you have the help and support you need at home  All topics are updated as new evidence becomes available and our peer review process is complete.  This topic retrieved from [employer] on: May 02, 2024.  Topic 620767 Version 1.0  Release: 32.4.3 - C32.122  © 2024 UpToDate, Inc. and/or its affiliates. All rights reserved.  Consumer Information Use and Disclaimer   Disclaimer: This generalized information is a limited summary of diagnosis, treatment, and/or medication information. It is not meant to be comprehensive and should be used as a tool to help the user understand and/or assess potential diagnostic and treatment options. It does NOT include all information about conditions, treatments, medications, side effects, or risks that may apply to a specific patient. It is not intended to be medical advice or a substitute for the medical advice, diagnosis, or treatment of a health care provider based on the health care provider's examination and assessment of a patient's specific and unique circumstances. Patients must speak with a health care provider for complete information about their health, medical questions, and treatment options, including any risks or benefits regarding use of medications. This information does not endorse any treatments or medications as safe, effective, or approved for treating a specific patient. UpToDate, Inc. and its affiliates disclaim any warranty or liability relating to this information or the use thereof.The use of this information  is governed by the Terms of Use, available at https://www.woltersPrecision Repair Networkuwer.com/en/know/clinical-effectiveness-terms. 2024© [employer] and its affiliates and/or licensors. All rights reserved.  Copyright   © 2024 [employer] and/or its affiliates. All rights reserved.

## 2024-12-05 NOTE — ASSESSMENT & PLAN NOTE
Well-controlled off medication.  Continue dietary changes as discussed.  Discussed low purines.

## 2024-12-05 NOTE — ASSESSMENT & PLAN NOTE
Stable.  Continue current management with losartan 100 mg p.o. daily.  Following with cardiology.  Orders:    Comprehensive metabolic panel; Future

## 2024-12-05 NOTE — ASSESSMENT & PLAN NOTE
Arthralgias improving.  Following with rheumatology.  Continue on prednisone taper as recommended.

## 2024-12-05 NOTE — ASSESSMENT & PLAN NOTE
-Notes some bilateral low back pain after moving some heavy stone over the weekend.  -Denies any red flag symptoms, no radiation into the lower extremities.  -Will start Robaxin as ordered for muscle relaxation.  -Discussed home exercise program.  Handout provided.  -Discussed referral to formal physical therapy.  We will start with home exercises as reviewed.  -Continue Tylenol 975 3 times daily as needed.  -Follow-up as needed.  Orders:    methocarbamol (ROBAXIN) 500 mg tablet; Take 1 tablet (500 mg total) by mouth 3 (three) times a day

## 2024-12-05 NOTE — PROGRESS NOTES
Adult Annual Physical  Name: Shilo Olmos      : 1965      MRN: [id number]  Encounter Provider: Naseem Corrigan DO  Encounter Date: 2024   Encounter department: [facility]    Assessment & Plan  Essential hypertension  Stable.  Continue current management with losartan 100 mg p.o. daily.  Following with cardiology.  Orders:    Comprehensive metabolic panel; Future    Gout of left foot, unspecified cause, unspecified chronicity  Well-controlled off medication.  Continue dietary changes as discussed.  Discussed low purines.       Pure hypercholesterolemia  Stable on last blood work.  Continue off statin for now as was having significant myalgias.  Continue following with cardiology.  Orders:    Lipid Panel with Direct LDL reflex; Future    PMR (polymyalgia rheumatica) (HCC)  Arthralgias improving.  Following with rheumatology.  Continue on prednisone taper as recommended.       Annual physical exam         BMI 31.0-31.9,adult         Lumbar back pain  -Notes some bilateral low back pain after moving some heavy stone over the weekend.  -Denies any red flag symptoms, no radiation into the lower extremities.  -Will start Robaxin as ordered for muscle relaxation.  -Discussed home exercise program.  Handout provided.  -Discussed referral to formal physical therapy.  We will start with home exercises as reviewed.  -Continue Tylenol 975 3 times daily as needed.  -Follow-up as needed.  Orders:    methocarbamol (ROBAXIN) 500 mg tablet; Take 1 tablet (500 mg total) by mouth 3 (three) times a day    Abdominal aortic aneurysm (AAA) 3.0 cm to 5.5 cm in diameter in male (HCC)  Following with vascular surgery.  Continue with surveillance at this time.           Immunizations and preventive care screenings were discussed with patient today. Appropriate education was printed on patient's after visit summary.    Discussed risks and benefits of prostate cancer screening. We discussed the  controversial history of PSA screening for prostate cancer in the [place] as well as the risk of over detection and over treatment of prostate cancer by way of PSA screening.  The patient understands that PSA blood testing is an imperfect way to screen for prostate cancer and that elevated PSA levels in the blood may also be caused by infection, inflammation, prostatic trauma or manipulation, urological procedures, or by benign prostatic enlargement.    The role of the digital rectal examination in prostate cancer screening was also discussed and I discussed with him that there is large interobserver variability in the findings of digital rectal examination.    Counseling:  Alcohol/drug use: discussed moderation in alcohol intake, the recommendations for healthy alcohol use, and avoidance of illicit drug use.  Dental Health: discussed importance of regular tooth brushing, flossing, and dental visits.  Injury prevention: discussed safety/seat belts, safety helmets, smoke detectors, carbon monoxide detectors, and smoking near bedding or upholstery.  Sexual health: discussed sexually transmitted diseases, partner selection, use of condoms, avoidance of unintended pregnancy, and contraceptive alternatives.  Exercise: the importance of regular exercise/physical activity was discussed. Recommend exercise 3-5 times per week for at least 30 minutes.          History of Present Illness     Adult Annual Physical:  Patient presents for annual physical.     Diet and Physical Activity:  - Diet/Nutrition: consuming 3-5 servings of fruits/vegetables daily and well balanced diet.  - Exercise: walking.    Depression Screening:  - PHQ-2 Score: 0    General Health:  - Sleep: sleeps well and 7-8 hours of sleep on average.  - Hearing: normal hearing bilateral ears.  - Vision: goes for regular eye exams.  - Dental: brushes teeth twice daily.     Health:  - History of STDs: no.   - Urinary symptoms: none.     Advanced Care  "Planning:  - Has an advanced directive?: no    - Has a durable medical POA?: no    - ACP document given to patient?: no      Review of Systems   Constitutional:  Negative for chills and fever.   HENT:  Negative for ear pain and sore throat.    Eyes:  Negative for pain and visual disturbance.   Respiratory:  Negative for cough and shortness of breath.    Cardiovascular:  Negative for chest pain and palpitations.   Gastrointestinal:  Negative for abdominal pain and vomiting.   Genitourinary:  Negative for dysuria and hematuria.   Musculoskeletal:  Positive for arthralgias and back pain. Negative for neck pain.   Skin:  Negative for color change and rash.   Neurological:  Negative for seizures and syncope.   Psychiatric/Behavioral:  Negative for confusion and sleep disturbance. The patient is not nervous/anxious.    All other systems reviewed and are negative.        Objective   /92 (BP Location: Left arm, Patient Position: Sitting, Cuff Size: Standard)   Pulse 78   Temp (!) 97.2 °F (36.2 °C) (Tympanic)   Resp 16   Ht 5' 11.58\" (1.818 m)   Wt 103 kg (226 lb 9.6 oz)   SpO2 96%   BMI 31.10 kg/m²     Physical Exam  Vitals and nursing note reviewed.   Constitutional:       General: He is not in acute distress.     Appearance: Normal appearance.   HENT:      Head: Normocephalic and atraumatic.      Right Ear: Tympanic membrane and external ear normal.      Left Ear: Tympanic membrane and external ear normal.      Nose: Nose normal.      Mouth/Throat:      Mouth: Mucous membranes are moist.   Eyes:      Extraocular Movements: Extraocular movements intact.      Conjunctiva/sclera: Conjunctivae normal.      Pupils: Pupils are equal, round, and reactive to light.   Cardiovascular:      Rate and Rhythm: Normal rate and regular rhythm.      Pulses: Normal pulses.      Heart sounds: Normal heart sounds. No murmur heard.  Pulmonary:      Effort: Pulmonary effort is normal.      Breath sounds: Normal breath sounds. No " wheezing, rhonchi or rales.   Abdominal:      General: Bowel sounds are normal.      Palpations: Abdomen is soft.      Tenderness: There is no abdominal tenderness. There is no guarding.   Musculoskeletal:         General: Normal range of motion.      Cervical back: Normal range of motion.      Right lower leg: No edema.      Left lower leg: No edema.      Comments: Bilateral paraspinal muscle hypertonicity noted, negative straight leg raise.   Lymphadenopathy:      Cervical: No cervical adenopathy.   Skin:     General: Skin is warm.      Capillary Refill: Capillary refill takes less than 2 seconds.   Neurological:      General: No focal deficit present.      Mental Status: He is alert and oriented to person, place, and time.   Psychiatric:         Mood and Affect: Mood normal.         Behavior: Behavior normal.

## 2024-12-26 ENCOUNTER — TELEPHONE (OUTPATIENT)
Age: 59
End: 2024-12-26

## 2024-12-26 NOTE — TELEPHONE ENCOUNTER
Patients wife calling stating that patient is sick with head congestion and cough, no fever.  She wants to give him OTC meds but not sure which to give him as he is currently on Prednisone 9mg.  Please review and call patients wife Jerry to advise which OTC medication would be best for patient.    Please note once patients wife is called   Thank you

## 2025-01-22 ENCOUNTER — OFFICE VISIT (OUTPATIENT)
Dept: CARDIOLOGY CLINIC | Facility: CLINIC | Age: 60
End: 2025-01-22
Payer: COMMERCIAL

## 2025-01-22 VITALS
BODY MASS INDEX: 31.02 KG/M2 | DIASTOLIC BLOOD PRESSURE: 80 MMHG | HEART RATE: 73 BPM | SYSTOLIC BLOOD PRESSURE: 118 MMHG | WEIGHT: 226 LBS | OXYGEN SATURATION: 95 %

## 2025-01-22 DIAGNOSIS — I10 ESSENTIAL HYPERTENSION: ICD-10-CM

## 2025-01-22 DIAGNOSIS — I77.810 AORTIC ECTASIA, THORACIC (HCC): Primary | ICD-10-CM

## 2025-01-22 DIAGNOSIS — E78.2 MIXED HYPERLIPIDEMIA: ICD-10-CM

## 2025-01-22 PROCEDURE — 99214 OFFICE O/P EST MOD 30 MIN: CPT | Performed by: INTERNAL MEDICINE

## 2025-01-22 NOTE — PROGRESS NOTES
Rheumatology Follow-up Visit  Name: Shilo Olmos      : 1965      MRN: [id number]  Encounter Provider: Samaria Greer MD  Encounter Date: 2025   Encounter department: [facility] [address]  :  Assessment & Plan  PMR (polymyalgia rheumatica) (HCC)  59-year-old male who presents for follow-up of PMR.  So far has been doing very well on his taper of prednisone without any recurrence of symptoms.  Recommend continuing with slow taper of 1 mg/month.  Update inflammatory markers.  No signs or symptoms of GCA.  Follow-up in 3 to 4 months.  Orders:    C-reactive protein; Future    Sedimentation rate, automated; Future    Current chronic use of systemic steroids  Continue vitamin D supplementation.  DEXA scan without indication for treatment.           History of Present Illness   HPI  Shilo Olmos is a 59 y.o. male who presents for follow-up.    Interval History:  Patient reports overall doing well.  He is down to 8 mg prednisone.  Patient has been tolerating the steroid taper without any issues.  Sometimes does notice that hands are little bit more achy especially if he sleeps in and does not take the prednisone until later.    Permanent History:  Patient was initially seen in 2024 for bilateral shoulder, hip, hand, and wrist pain and prolonged morning stiffness and excellent response to steroids.  Workup revealed elevated inflammatory markers and a knee aspiration showed inflammatory fluid with CPPD crystals.  RF and CCP were negative.  Diagnosed with PMR and started on PMR steroid taper.    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 Reviewed CTA abd with run off - no clear findings of vasculitis. Will need to monitor for any new or worsening vessel changes as although it is uncommon, patients can develop extracranial GCA without intracranial involvement/symptoms     Review of Systems  Complete ROS conducted as  "per HPI. In addition, denies:  Fever  Jaw pain   Headache  Vision symptoms   Chest pain  SOB  Pleurisy  Joint issues other than noted above    Objective   /72 (BP Location: Right arm, Patient Position: Sitting, Cuff Size: Adult)   Pulse 72   Temp (!) 97.1 °F (36.2 °C) (Tympanic)   Ht 5' 11\" (1.803 m)   Wt 107 kg (235 lb)   SpO2 97%   BMI 32.78 kg/m²      Physical Exam  Physical Exam  Constitutional: well appearing, no acute distress  HEENT: normocephalic, sclera clear, no visible oral or nasal ulcers  Neck: supple, no palpable cervical adenopathy  CV: regular rate and rhythm, no murmur  Pulm: normal respiratory effort, lungs clear to auscultation b/l  Skin: no rashes, no skin thickening  Extremities: warm and well perfused, no edema  MSK: No synovitis or effusions, normal range of motion throughout    Labs and Imaging  I have personally reviewed pertinent labs and imaging.   "

## 2025-01-22 NOTE — PROGRESS NOTES
[facility]    CHIEF COMPLAINT:   Chief Complaint   Patient presents with    Follow-up     No cardiac concerns or complaints        HPI:  Shilo Olmos is a 59 y.o. male with a past medical history current everyday smoker, PAD, infrarenal AAA, bilateral popliteal artery aneurysm, thrombosed R popliteal artery, L popliteal aneurysm s/p L fem-pop bypass with rGSV (1/16/24), hx DVT (9/2023).    OV - 1/10/24: He presents today for preoperative risk assessment for left popliteal aneurysm exclusion and distal SFA to below the knee popliteal artery bypass.  Surgery is tentatively scheduled for next week on 1/16/2024. He is a current everyday smoker but has significantly cut back from 1 pack/day to 1-3 cigarettes/day.  He does not buy his own packs of cigarettes anymore. He has a history of hypercholesterolemia and takes rosuvastatin 10 mg.  Last lipid panel was in January 2023. He has no current exercise regimen.  He complains of leg cramping worse in his right leg.     Social history: Current everyday smoker. Smoking since 20s - up to 1 ppd.   Family history: Mother has hypercholesterolemia, hypertension. Sister has both as well. No history of MI or CVA in first degree relatives. Doesn't know biological father medical history. No family history of aneurysms that he is aware of.     Interval history: Now s/p L fem-pop bypass (1/2024). Had myalgias with crestor and this was discontinued. Following with Rheumatology with PMR and is now on Prednisone with slow taper. Otherwise, doing well without acute complaints. Not doing much physical activity at current. He denies lightheadedness, syncope, chest pain, palpitations, shortness of breath, orthopnea.    The following portions of the patient's history were reviewed and updated as appropriate: allergies, current medications, past family history, past medical history, past social history, past surgical history, and problem list.    SINCE LAST OV I  REVIEWED WITH THE PATIENT THE INTERIM LABS, TEST RESULTS, CONSULTANT(S) NOTES AND PERFORMED AN INTERIM REVIEW OF HISTORY    Past Medical History:   Diagnosis Date    Abdominal aortic aneurysm (AAA) (HCC)     Colon polyp     Hyperlipidemia     Hypertension     Psoriasis        Past Surgical History:   Procedure Laterality Date    CLOSURE WOUND Left 1/19/2024    Procedure: CLOSURE LEFT LOWER EXTREMITY WOUND, WASHOUT;  Surgeon: Parker Valdez MD;  [facility];  Service: Vascular    EVACUATION OF HEMATOMA Left 1/17/2024    Procedure: EVACUATION/ DRAINAGE HEMATOMA, EXPLORATION ON LEFT LEG BYPASS, CONTROLL OF BLEEDING;  Surgeon: Mariana Vargas DO;  [facility];  Service: Vascular    KNEE SURGERY Bilateral     [place] BYPASS W/VEIN FEMORAL-POPLITEAL Left 1/16/2024    Procedure: BYPASS FEMORAL-POPLITEAL -Left above knee popliteal to below knee popliteal artery bypass WITH REVERSE SAPHENOUS VEIN GRAFT; EXCLUSION OF POPLITEAL ANEURYSM;  Surgeon: Parker Valdez MD;  [facility]: [facility];  Service: Vascular       Social History     Socioeconomic History    Marital status: /Civil Union     Spouse name: Not on file    Number of children: Not on file    Years of education: Not on file    Highest education level: Not on file   Occupational History    Not on file   Tobacco Use    Smoking status: Former     Current packs/day: 0.25     Average packs/day: 0.3 packs/day for 32.0 years (8.0 ttl pk-yrs)     Types: Cigarettes    Smokeless tobacco: Never    Tobacco comments:     1 PPD x 15 years - As per [place]    Vaping Use    Vaping status: Never Used   Substance and Sexual Activity    Alcohol use: Yes     Comment: rare social use one time per month    Drug use: Never    Sexual activity: Yes     Partners: Female   Other Topics Concern    Not on file   Social History Narrative    Current some day smoker - As per [employer]         · Most recent tobacco use screening:   10-      · Do  you currently or have you served in the [employer]:   No     As per [employer]      Social Drivers of Health     Financial Resource Strain: Not on file   Food Insecurity: No Food Insecurity (1/17/2024)    Nursing - Inadequate Food Risk Classification     Worried About Running Out of Food in the Last Year: Never true     Ran Out of Food in the Last Year: Never true     Ran Out of Food in the Last Year: Not on file   Transportation Needs: No Transportation Needs (1/17/2024)    PRAPARE - Transportation     Lack of Transportation (Medical): No     Lack of Transportation (Non-Medical): No   Physical Activity: Not on file   Stress: Not on file   Social Connections: Not on file   Intimate Partner Violence: Not on file   Housing Stability: Low Risk  (1/17/2024)    Housing Stability Vital Sign     Unable to Pay for Housing in the Last Year: No     Number of Times Moved in the Last Year: 1     Homeless in the Last Year: No       Family History   Problem Relation Age of Onset    Hypertension Mother     No Known Problems Father        No Known Allergies    Current Outpatient Medications   Medication Sig Dispense Refill    acetaminophen (TYLENOL) 325 mg tablet Take 3 tablets (975 mg total) by mouth every 8 (eight) hours      apixaban (Eliquis) 5 mg Take 1 tablet (5 mg total) by mouth 2 (two) times a day 180 tablet 1    aspirin 81 mg chewable tablet Chew 1 tablet (81 mg total) daily 30 tablet 3    calcium carbonate (TUMS) 500 mg chewable tablet Chew 1 tablet (500 mg total) daily as needed for indigestion or heartburn      losartan (COZAAR) 100 MG tablet Take 1 tablet (100 mg total) by mouth daily 90 tablet 1    methocarbamol (ROBAXIN) 500 mg tablet Take 1 tablet (500 mg total) by mouth 3 (three) times a day 60 tablet 0    Omega 3-6-9 Fatty Acids (OMEGA 3-6-9 PO) Take by mouth      predniSONE 1 mg tablet Take with the 5 mg tablets. Take 9mg daily for 30 days, then 8mg daily for 30 days, then 7mg daily for 30 days, then 6mg daily  for 30 days, then 5mg daily for 30 days, then 4mg daily for 30 days, then 3mg daily for 30 days, then 2mg daily for 30 days, then 1mg daily for 30 days, then stop 150 tablet 4    predniSONE 5 mg tablet Take with the 1 mg tablets. Take 9mg daily for 30 days, then 8mg daily for 30 days, then 7mg daily for 30 days, then 6mg daily for 30 days, then 5mg daily for 30 days, then 4mg daily for 30 days, then 3mg daily for 30 days, then 2mg daily for 30 days, then 1mg daily for 30 days, then stop 150 tablet 4    Cholecalciferol (Vitamin D) 50 MCG (2000 UT) tablet Take 2,000 Units by mouth daily (Patient taking differently: Take 1,000 Units by mouth daily)      Elastic Bandages & Supports (Medical Compression Thigh High) MISC Use daily 15-20mmHg 2 each 4    ferrous sulfate 324 (65 Fe) mg Take 1 tablet (324 mg total) by mouth 2 (two) times a day before meals (Patient taking differently: Take 324 mg by mouth daily 1 tablet) 180 tablet 1    rosuvastatin (CRESTOR) 10 MG tablet TAKE 1 TABLET BY MOUTH EVERY DAY (Patient not taking: Reported on 10/28/2024) 90 tablet 1    sodium picosulfate, magnesium oxide, citric acid (Clenpiq) oral solution Take 175 mL (1 bottle) the evening before the colonoscopy, between 5 PM and 9 PM, followed by a second 175 mL bottle 5 hours before the colonoscopy. (Patient not taking: Reported on 9/30/2024) 350 mL 0     No current facility-administered medications for this visit.       /80 (BP Location: Right arm, Patient Position: Sitting, Cuff Size: Standard)   Pulse 73   Wt 103 kg (226 lb)   SpO2 95%   BMI 31.02 kg/m²     Review of Systems   All other systems reviewed and are negative.      Physical Exam  Vitals reviewed.   Constitutional:       General: He is not in acute distress.     Appearance: Normal appearance. He is well-developed. He is not toxic-appearing.   HENT:      Head: Normocephalic and atraumatic.   Eyes:      General: No scleral icterus.     Extraocular Movements: Extraocular  movements intact.      Conjunctiva/sclera: Conjunctivae normal.   Cardiovascular:      Rate and Rhythm: Normal rate and regular rhythm.      Heart sounds: Normal heart sounds. No murmur heard.     No gallop.   Pulmonary:      Effort: Pulmonary effort is normal. No respiratory distress.      Breath sounds: Normal breath sounds. No wheezing or rales.   Abdominal:      General: Abdomen is flat. Bowel sounds are normal. There is no distension.      Palpations: Abdomen is soft.      Tenderness: There is no abdominal tenderness.   Musculoskeletal:      Right lower leg: No edema.      Left lower leg: No edema.   Skin:     General: Skin is warm and dry.      Capillary Refill: Capillary refill takes less than 2 seconds.      Coloration: Skin is not jaundiced or pale.   Neurological:      Mental Status: He is alert.   Psychiatric:         Mood and Affect: Mood normal.         Behavior: Behavior normal.          Lab Results   Component Value Date    K 4.3 09/30/2024     09/30/2024    CO2 26 09/30/2024    BUN 12 09/30/2024    CREATININE 0.86 09/30/2024    CALCIUM 9.5 09/30/2024    ALT 12 09/30/2024    AST 17 09/30/2024    INR 1.11 01/24/2024       Lab Results   Component Value Date    HDL 38 (L) 10/28/2024    LDLCALC 125 (H) 10/28/2024    TRIG 81 10/28/2024       Lab Results   Component Value Date    WBC 6.95 09/07/2024    HGB 12.0 09/07/2024    HCT 37.7 09/07/2024     09/07/2024       Cardiac studies:   TTE - 9/15/23:    Left Ventricle: Left ventricular cavity size is normal. Wall thickness is mildly increased. There is mild to moderate concentric hypertrophy. The left ventricular ejection fraction is 60%. Systolic function is normal. Wall motion is normal. Diastolic function is normal.    Aorta: The aortic root is mildly dilated. The ascending aorta is mildly dilated. The aortic root is 4.00 cm. The ascending aorta is 4.2 cm.    CT lung screening-9/15/2023:No nodules and/or definitely benign nodules. Mild  emphysema. 43 mm ascending aortic ectasia. Recommend follow-up low radiation dose noncontrast chest CT in one year.     ASSESSMENT AND PLAN:  Shilo was seen today for follow-up.    Diagnoses and all orders for this visit:    Aortic ectasia, thoracic (HCC): Stable size on both TTE and CT. BP at goal. Continue annual surveillance. Following with vascular surgery for AAA surveillance.  CT lung - 9/15/23: 4.3 cm  TTE - 9/15/23: 4.2 cm  TTE - 6/26/24: 4.2 cm  CT lung - 12/4/24: 4.3 cm    Mixed hyperlipidemia: Lipid panel from 10/2024 - total cholesterol 179, Tgs 81, HDL 38, . Joint pain was not alleviated with stopping rosuvastatin so lower suspicion for statin associated muscle symptoms. Coronary calcifications on prior CT imaging. Consider retrial of statin.     Essential hypertension: Blood pressure is well-controlled and at goal. Continue losartan 100 mg.    Racquel Cortez MD

## 2025-01-28 ENCOUNTER — OFFICE VISIT (OUTPATIENT)
Age: 60
End: 2025-01-28
Payer: COMMERCIAL

## 2025-01-28 VITALS
DIASTOLIC BLOOD PRESSURE: 72 MMHG | HEIGHT: 71 IN | OXYGEN SATURATION: 97 % | BODY MASS INDEX: 32.9 KG/M2 | WEIGHT: 235 LBS | TEMPERATURE: 97.1 F | SYSTOLIC BLOOD PRESSURE: 120 MMHG | HEART RATE: 72 BPM

## 2025-01-28 DIAGNOSIS — Z79.52 CURRENT CHRONIC USE OF SYSTEMIC STEROIDS: ICD-10-CM

## 2025-01-28 DIAGNOSIS — M35.3 PMR (POLYMYALGIA RHEUMATICA) (HCC): Primary | ICD-10-CM

## 2025-01-28 PROCEDURE — 99214 OFFICE O/P EST MOD 30 MIN: CPT | Performed by: STUDENT IN AN ORGANIZED HEALTH CARE EDUCATION/TRAINING PROGRAM

## 2025-01-28 NOTE — ASSESSMENT & PLAN NOTE
59-year-old male who presents for follow-up of PMR.  So far has been doing very well on his taper of prednisone without any recurrence of symptoms.  Recommend continuing with slow taper of 1 mg/month.  Update inflammatory markers.  No signs or symptoms of GCA.  Follow-up in 3 to 4 months.  Orders:    C-reactive protein; Future    Sedimentation rate, automated; Future

## 2025-01-31 ENCOUNTER — OFFICE VISIT (OUTPATIENT)
Dept: FAMILY MEDICINE CLINIC | Facility: CLINIC | Age: 60
End: 2025-01-31
Payer: COMMERCIAL

## 2025-01-31 ENCOUNTER — APPOINTMENT (OUTPATIENT)
Dept: LAB | Facility: CLINIC | Age: 60
End: 2025-01-31
Payer: COMMERCIAL

## 2025-01-31 VITALS
SYSTOLIC BLOOD PRESSURE: 110 MMHG | HEART RATE: 74 BPM | OXYGEN SATURATION: 97 % | DIASTOLIC BLOOD PRESSURE: 90 MMHG | BODY MASS INDEX: 32.51 KG/M2 | RESPIRATION RATE: 16 BRPM | TEMPERATURE: 96.3 F | HEIGHT: 71 IN | WEIGHT: 232.2 LBS

## 2025-01-31 DIAGNOSIS — E78.00 PURE HYPERCHOLESTEROLEMIA: ICD-10-CM

## 2025-01-31 DIAGNOSIS — I10 ESSENTIAL HYPERTENSION: ICD-10-CM

## 2025-01-31 DIAGNOSIS — M35.3 PMR (POLYMYALGIA RHEUMATICA) (HCC): ICD-10-CM

## 2025-01-31 DIAGNOSIS — R31.29 MICROSCOPIC HEMATURIA: Primary | ICD-10-CM

## 2025-01-31 LAB
ALBUMIN SERPL BCG-MCNC: 4.2 G/DL (ref 3.5–5)
ALP SERPL-CCNC: 50 U/L (ref 34–104)
ALT SERPL W P-5'-P-CCNC: 12 U/L (ref 7–52)
ANION GAP SERPL CALCULATED.3IONS-SCNC: 6 MMOL/L (ref 4–13)
AST SERPL W P-5'-P-CCNC: 14 U/L (ref 13–39)
BILIRUB SERPL-MCNC: 0.4 MG/DL (ref 0.2–1)
BUN SERPL-MCNC: 18 MG/DL (ref 5–25)
CALCIUM SERPL-MCNC: 9.6 MG/DL (ref 8.4–10.2)
CHLORIDE SERPL-SCNC: 106 MMOL/L (ref 96–108)
CHOLEST SERPL-MCNC: 226 MG/DL (ref ?–200)
CO2 SERPL-SCNC: 28 MMOL/L (ref 21–32)
CREAT SERPL-MCNC: 1 MG/DL (ref 0.6–1.3)
CRP SERPL QL: 6.2 MG/L
ERYTHROCYTE [SEDIMENTATION RATE] IN BLOOD: 17 MM/HOUR (ref 0–19)
GFR SERPL CREATININE-BSD FRML MDRD: 82 ML/MIN/1.73SQ M
GLUCOSE P FAST SERPL-MCNC: 87 MG/DL (ref 65–99)
HDLC SERPL-MCNC: 53 MG/DL
LDLC SERPL CALC-MCNC: 147 MG/DL (ref 0–100)
POTASSIUM SERPL-SCNC: 4.4 MMOL/L (ref 3.5–5.3)
PROT SERPL-MCNC: 6.9 G/DL (ref 6.4–8.4)
SL AMB  POCT GLUCOSE, UA: ABNORMAL
SL AMB LEUKOCYTE ESTERASE,UA: ABNORMAL
SL AMB POCT BILIRUBIN,UA: ABNORMAL
SL AMB POCT BLOOD,UA: ABNORMAL
SL AMB POCT CLARITY,UA: CLEAR
SL AMB POCT COLOR,UA: YELLOW
SL AMB POCT KETONES,UA: ABNORMAL
SL AMB POCT NITRITE,UA: ABNORMAL
SL AMB POCT PH,UA: 6
SL AMB POCT SPECIFIC GRAVITY,UA: 1.02
SL AMB POCT URINE PROTEIN: ABNORMAL
SL AMB POCT UROBILINOGEN: ABNORMAL
SODIUM SERPL-SCNC: 140 MMOL/L (ref 135–147)
TRIGL SERPL-MCNC: 129 MG/DL (ref ?–150)

## 2025-01-31 PROCEDURE — 80061 LIPID PANEL: CPT

## 2025-01-31 PROCEDURE — 99213 OFFICE O/P EST LOW 20 MIN: CPT | Performed by: FAMILY MEDICINE

## 2025-01-31 PROCEDURE — 36415 COLL VENOUS BLD VENIPUNCTURE: CPT

## 2025-01-31 PROCEDURE — 86140 C-REACTIVE PROTEIN: CPT

## 2025-01-31 PROCEDURE — 85652 RBC SED RATE AUTOMATED: CPT

## 2025-01-31 PROCEDURE — 81002 URINALYSIS NONAUTO W/O SCOPE: CPT | Performed by: FAMILY MEDICINE

## 2025-01-31 PROCEDURE — 80053 COMPREHEN METABOLIC PANEL: CPT

## 2025-01-31 NOTE — PROGRESS NOTES
Name: Shilo Olmos      : 1965      MRN: [id number]  Encounter Provider: Naseem Corrigan DO  Encounter Date: 2025   Encounter department: 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  -POCT urine dip with negative protein urine, no signs of infection.  Mild microscopic hematuria.  -Will get ultrasound kidney and bladder to evaluate.  -Reviewed adequate hydration.  -Continue to recommend smoking cessation.  Discussed risk.  -Will follow-up with ultrasound.  Orders:    POCT urine dip    US kidney and bladder; Future         History of Present Illness     Jalil is a 59-year-old male presents today for evaluation after having DOT physical where he was told he had protein in his urine.  On presentation today urine dip with negative protein.  Does have microscopic hematuria.  No signs of infection.  He has no symptoms of blood in his urine, no infectious symptoms.  No back pain, fevers, chills, nausea, vomiting, diarrhea.  No unwanted weight loss or night sweats.  Most recent blood panel shows normal GFR and creatinine numbers which we discussed.  We will get ultrasound kidney and bladder to further evaluate.       Review of Systems   Constitutional:  Negative for chills and fever.   HENT:  Negative for ear pain and sore throat.    Eyes:  Negative for pain and visual disturbance.   Respiratory:  Negative for cough and shortness of breath.    Cardiovascular:  Negative for chest pain and palpitations.   Gastrointestinal:  Negative for abdominal pain and vomiting.   Genitourinary:  Negative for dysuria and hematuria.   Musculoskeletal:  Negative for arthralgias and back pain.   Skin:  Negative for color change and rash.   Neurological:  Negative for seizures and syncope.   Psychiatric/Behavioral:  The patient is not nervous/anxious.    All other systems reviewed and are  negative.    Past Medical History:   Diagnosis Date    Abdominal aortic aneurysm (AAA) (HCC)     Colon polyp     Hyperlipidemia     Hypertension     Psoriasis      Past Surgical History:   Procedure Laterality Date    CLOSURE WOUND Left 1/19/2024    Procedure: CLOSURE LEFT LOWER EXTREMITY WOUND, WASHOUT;  Surgeon: Parker Valdez MD;  [facility];  Service: Vascular    EVACUATION OF HEMATOMA Left 1/17/2024    Procedure: EVACUATION/ DRAINAGE HEMATOMA, EXPLORATION ON LEFT LEG BYPASS, CONTROLL OF BLEEDING;  Surgeon: Mariana Vargas DO;  [facility];  Service: Vascular    KNEE SURGERY Bilateral     [place] BYPASS W/VEIN FEMORAL-POPLITEAL Left 1/16/2024    Procedure: BYPASS FEMORAL-POPLITEAL -Left above knee popliteal to below knee popliteal artery bypass WITH REVERSE SAPHENOUS VEIN GRAFT; EXCLUSION OF POPLITEAL ANEURYSM;  Surgeon: Parker Valdez MD;  [facility];  Service: Vascular     Family History   Problem Relation Age of Onset    Hypertension Mother     No Known Problems Father      Social History     Tobacco Use    Smoking status: Former     Current packs/day: 0.25     Average packs/day: 0.3 packs/day for 32.0 years (8.0 ttl pk-yrs)     Types: Cigarettes    Smokeless tobacco: Never    Tobacco comments:     1 PPD x 15 years - As per [place]    Vaping Use    Vaping status: Never Used   Substance and Sexual Activity    Alcohol use: Yes     Comment: rare social use one time per month    Drug use: Never    Sexual activity: Yes     Partners: Female     Current Outpatient Medications on File Prior to Visit   Medication Sig    acetaminophen (TYLENOL) 325 mg tablet Take 3 tablets (975 mg total) by mouth every 8 (eight) hours    apixaban (Eliquis) 5 mg Take 1 tablet (5 mg total) by mouth 2 (two) times a day    aspirin 81 mg chewable tablet Chew 1 tablet (81 mg total) daily    calcium carbonate (TUMS) 500 mg chewable tablet Chew 1 tablet (500 mg total) daily as needed for  indigestion or heartburn    Cholecalciferol (Vitamin D) 50 MCG (2000 UT) tablet Take 2,000 Units by mouth daily    ferrous sulfate 324 (65 Fe) mg Take 1 tablet (324 mg total) by mouth 2 (two) times a day before meals (Patient taking differently: Take 324 mg by mouth as needed)    losartan (COZAAR) 100 MG tablet Take 1 tablet (100 mg total) by mouth daily    methocarbamol (ROBAXIN) 500 mg tablet Take 1 tablet (500 mg total) by mouth 3 (three) times a day    Omega 3-6-9 Fatty Acids (OMEGA 3-6-9 PO) Take by mouth    predniSONE 1 mg tablet Take with the 5 mg tablets. Take 9mg daily for 30 days, then 8mg daily for 30 days, then 7mg daily for 30 days, then 6mg daily for 30 days, then 5mg daily for 30 days, then 4mg daily for 30 days, then 3mg daily for 30 days, then 2mg daily for 30 days, then 1mg daily for 30 days, then stop    predniSONE 5 mg tablet Take with the 1 mg tablets. Take 9mg daily for 30 days, then 8mg daily for 30 days, then 7mg daily for 30 days, then 6mg daily for 30 days, then 5mg daily for 30 days, then 4mg daily for 30 days, then 3mg daily for 30 days, then 2mg daily for 30 days, then 1mg daily for 30 days, then stop    Elastic Bandages & Supports (Medical Compression Thigh High) MISC Use daily 15-20mmHg (Patient not taking: Reported on 1/31/2025)    rosuvastatin (CRESTOR) 10 MG tablet TAKE 1 TABLET BY MOUTH EVERY DAY (Patient not taking: Reported on 1/31/2025)    sodium picosulfate, magnesium oxide, citric acid (Clenpiq) oral solution Take 175 mL (1 bottle) the evening before the colonoscopy, between 5 PM and 9 PM, followed by a second 175 mL bottle 5 hours before the colonoscopy. (Patient not taking: Reported on 1/31/2025)     No Known Allergies  Immunization History   Administered Date(s) Administered    COVID-19 MODERNA VACC 0.5 ML IM 10/12/2021, 11/09/2021     Objective   /90 (BP Location: Left arm, Patient Position: Sitting, Cuff Size: Large)   Pulse 74   Temp (!) 96.3 °F (35.7 °C)  "(Tympanic)   Resp 16   Ht 5' 11\" (1.803 m)   Wt 105 kg (232 lb 3.2 oz)   SpO2 97%   BMI 32.39 kg/m²     Physical Exam  Vitals and nursing note reviewed.   Constitutional:       General: He is not in acute distress.     Appearance: Normal appearance.   HENT:      Head: Normocephalic and atraumatic.      Right Ear: Tympanic membrane and external ear normal.      Left Ear: Tympanic membrane and external ear normal.      Nose: Nose normal.      Mouth/Throat:      Mouth: Mucous membranes are moist.   Eyes:      Extraocular Movements: Extraocular movements intact.      Conjunctiva/sclera: Conjunctivae normal.      Pupils: Pupils are equal, round, and reactive to light.   Cardiovascular:      Rate and Rhythm: Normal rate and regular rhythm.      Pulses: Normal pulses.      Heart sounds: Normal heart sounds. No murmur heard.  Pulmonary:      Effort: Pulmonary effort is normal.      Breath sounds: Normal breath sounds. No wheezing, rhonchi or rales.   Abdominal:      General: Bowel sounds are normal.      Palpations: Abdomen is soft.      Tenderness: There is no abdominal tenderness. There is no guarding.   Musculoskeletal:         General: Normal range of motion.      Cervical back: Normal range of motion.      Right lower leg: No edema.      Left lower leg: No edema.   Lymphadenopathy:      Cervical: No cervical adenopathy.   Skin:     General: Skin is warm.      Capillary Refill: Capillary refill takes less than 2 seconds.   Neurological:      General: No focal deficit present.      Mental Status: He is alert and oriented to person, place, and time.   Psychiatric:         Mood and Affect: Mood normal.         Behavior: Behavior normal.         "

## 2025-02-01 ENCOUNTER — HOSPITAL ENCOUNTER (OUTPATIENT)
Dept: ULTRASOUND IMAGING | Facility: HOSPITAL | Age: 60
Discharge: HOME | End: 2025-02-01
Attending: FAMILY MEDICINE
Payer: COMMERCIAL

## 2025-02-01 DIAGNOSIS — R31.29 MICROSCOPIC HEMATURIA: ICD-10-CM

## 2025-02-01 PROCEDURE — 76775 US EXAM ABDO BACK WALL LIM: CPT

## 2025-02-03 ENCOUNTER — RESULTS FOLLOW-UP (OUTPATIENT)
Age: 60
End: 2025-02-03

## 2025-02-11 ENCOUNTER — RESULTS FOLLOW-UP (OUTPATIENT)
Dept: FAMILY MEDICINE CLINIC | Facility: CLINIC | Age: 60
End: 2025-02-11

## 2025-02-11 NOTE — RESULT ENCOUNTER NOTE
Damian Jimenes,    Your ultrasound has come back.  There is no evidence of fluid buildup around the kidneys or stones in the kidneys which is great.  They did see incidental cyst on the left kidney -for now we will plan to repeat the ultrasound in 6-month to keep an eye on it.  Otherwise no other concerning results.  Your lab work showed kidney function look good and electrolytes were all normal.  Cholesterol was mildly elevated.  Please continue working on dietary changes and hydration as we reviewed.  Please let me know if you have any other questions at this time.  Thank you    -Dr. WEAVER

## 2025-03-08 DIAGNOSIS — I10 ESSENTIAL HYPERTENSION: ICD-10-CM

## 2025-03-09 RX ORDER — LOSARTAN POTASSIUM 100 MG/1
100 TABLET ORAL DAILY
Qty: 90 TABLET | Refills: 1 | Status: SHIPPED | OUTPATIENT
Start: 2025-03-09

## 2025-05-06 DIAGNOSIS — I10 ESSENTIAL HYPERTENSION: ICD-10-CM

## 2025-05-07 RX ORDER — LOSARTAN POTASSIUM 100 MG/1
100 TABLET ORAL DAILY
Qty: 90 TABLET | Refills: 1 | Status: SHIPPED | OUTPATIENT
Start: 2025-05-07

## 2025-05-21 NOTE — PROGRESS NOTES
Rheumatology Follow-up Visit  Name: Shilo Olmos      : 1965      MRN: [id number]  Encounter Provider: Samaria Greer MD  Encounter Date: 2025   Encounter department: [facility] [address]  :  Assessment & Plan  PMR (polymyalgia rheumatica) (HCC)  59-year-old male who presents for follow-up of PMR.  So far has been doing very well on his taper of prednisone without any recurrence of symptoms.  He has noticed some pain in his hands but I suspect this is more likely related to osteoarthritis rather than PMR.  We will confirm with x-rays.  Recommend continuing with slow taper of 1 mg/month.  No signs or symptoms of GCA.  Follow-up in 3 to 4 months.    Orders:    XR hand 3+ vw left; Future    XR hand 3+ vw right; Future         History of Present Illness   HPI  Shilo Olmos is a 59 y.o. male who presents for follow-up.    Interval History:  Patient reports overall he has been doing very well.  Currently on 5 mg daily prednisone.  No recurrence of PMR symptoms.  Only areas that bother him are hands and knees.  He has pain in his hands in the morning which does improve as the day goes on but never truly resolved.  More around the PIP area.    Permanent History:  Patient was initially seen in 2024 for bilateral shoulder, hip, hand, and wrist pain and prolonged morning stiffness and excellent response to steroids.  Workup revealed elevated inflammatory markers and a knee aspiration showed inflammatory fluid with CPPD crystals.  RF and CCP were negative.  Diagnosed with PMR and started on PMR steroid taper.    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 Reviewed CTA abd with run off - no clear findings of vasculitis. Will need to monitor for any new or worsening vessel changes as although it is uncommon, patients can develop extracranial GCA without intracranial involvement/symptoms     Review of Systems  Complete ROS  "conducted as per HPI. In addition, denies:  Fever  Jaw pain   Headache  Vision symptoms   Chest pain  SOB  Pleurisy  Joint issues other than noted above    Objective   /78 (BP Location: Left arm, Patient Position: Sitting, Cuff Size: Standard)   Pulse 57   Temp 97.5 °F (36.4 °C) (Tympanic)   Ht 5' 11\" (1.803 m)   Wt 106 kg (234 lb)   SpO2 97%   BMI 32.64 kg/m²      Physical Exam  Physical Exam  Constitutional: well appearing, no acute distress  HEENT: normocephalic, sclera clear, no visible oral or nasal ulcers  Neck: supple, no palpable cervical adenopathy  CV: regular rate and rhythm, no murmur  Pulm: normal respiratory effort, lungs clear to auscultation b/l  Skin: no rashes, no skin thickening  Extremities: warm and well perfused, no edema  MSK: No synovitis or effusions, normal range of motion throughout    Labs and Imaging  I have personally reviewed pertinent labs and imaging.   "

## 2025-05-28 ENCOUNTER — OFFICE VISIT (OUTPATIENT)
Age: 60
End: 2025-05-28
Payer: COMMERCIAL

## 2025-05-28 VITALS
DIASTOLIC BLOOD PRESSURE: 78 MMHG | TEMPERATURE: 97.5 F | SYSTOLIC BLOOD PRESSURE: 136 MMHG | BODY MASS INDEX: 32.76 KG/M2 | OXYGEN SATURATION: 97 % | WEIGHT: 234 LBS | HEART RATE: 57 BPM | HEIGHT: 71 IN

## 2025-05-28 DIAGNOSIS — M35.3 PMR (POLYMYALGIA RHEUMATICA) (HCC): Primary | ICD-10-CM

## 2025-05-28 PROCEDURE — 99214 OFFICE O/P EST MOD 30 MIN: CPT | Performed by: STUDENT IN AN ORGANIZED HEALTH CARE EDUCATION/TRAINING PROGRAM

## 2025-05-28 NOTE — ASSESSMENT & PLAN NOTE
59-year-old male who presents for follow-up of PMR.  So far has been doing very well on his taper of prednisone without any recurrence of symptoms.  He has noticed some pain in his hands but I suspect this is more likely related to osteoarthritis rather than PMR.  We will confirm with x-rays.  Recommend continuing with slow taper of 1 mg/month.  No signs or symptoms of GCA.  Follow-up in 3 to 4 months.    Orders:    XR hand 3+ vw left; Future    XR hand 3+ vw right; Future

## 2025-06-20 DIAGNOSIS — I82.441 ACUTE DEEP VEIN THROMBOSIS (DVT) OF TIBIAL VEIN OF RIGHT LOWER EXTREMITY (HCC): ICD-10-CM

## 2025-06-24 DIAGNOSIS — I71.40 ABDOMINAL AORTIC ANEURYSM (AAA) 3.0 CM TO 5.5 CM IN DIAMETER IN MALE (HCC): Primary | ICD-10-CM

## 2025-06-24 DIAGNOSIS — I72.4 POPLITEAL ARTERY ANEURYSM, BILATERAL (HCC): ICD-10-CM

## 2025-06-24 DIAGNOSIS — Z95.828 S/P FEMORAL-POPLITEAL BYPASS SURGERY: ICD-10-CM

## 2025-07-01 ENCOUNTER — TELEPHONE (OUTPATIENT)
Dept: GASTROENTEROLOGY | Facility: CLINIC | Age: 60
End: 2025-07-01

## 2025-07-01 ENCOUNTER — HOSPITAL ENCOUNTER (EMERGENCY)
Facility: HOSPITAL | Age: 60
Discharge: HOME | End: 2025-07-01
Attending: SURGERY
Payer: COMMERCIAL

## 2025-07-01 ENCOUNTER — TELEPHONE (OUTPATIENT)
Age: 60
End: 2025-07-01

## 2025-07-01 ENCOUNTER — HOSPITAL ENCOUNTER (EMERGENCY)
Facility: HOSPITAL | Age: 60
Discharge: STILL ADMITTED OR ADMINISTRATIVE | End: 2025-07-01
Attending: EMERGENCY MEDICINE | Admitting: EMERGENCY MEDICINE
Payer: COMMERCIAL

## 2025-07-01 ENCOUNTER — APPOINTMENT (EMERGENCY)
Dept: CT IMAGING | Facility: HOSPITAL | Age: 60
End: 2025-07-01
Payer: COMMERCIAL

## 2025-07-01 VITALS
RESPIRATION RATE: 12 BRPM | HEART RATE: 66 BPM | OXYGEN SATURATION: 96 % | DIASTOLIC BLOOD PRESSURE: 82 MMHG | TEMPERATURE: 97.8 F | SYSTOLIC BLOOD PRESSURE: 114 MMHG

## 2025-07-01 VITALS
DIASTOLIC BLOOD PRESSURE: 113 MMHG | SYSTOLIC BLOOD PRESSURE: 155 MMHG | WEIGHT: 229.72 LBS | RESPIRATION RATE: 17 BRPM | HEIGHT: 71 IN | TEMPERATURE: 97.9 F | BODY MASS INDEX: 32.16 KG/M2 | HEART RATE: 73 BPM | OXYGEN SATURATION: 95 %

## 2025-07-01 DIAGNOSIS — K44.9 HIATAL HERNIA WITH GASTROESOPHAGEAL REFLUX: Primary | ICD-10-CM

## 2025-07-01 DIAGNOSIS — K21.9 HIATAL HERNIA WITH GASTROESOPHAGEAL REFLUX: Primary | ICD-10-CM

## 2025-07-01 DIAGNOSIS — R10.13 EPIGASTRIC PAIN: ICD-10-CM

## 2025-07-01 DIAGNOSIS — I71.40 AAA (ABDOMINAL AORTIC ANEURYSM) (HCC): Primary | ICD-10-CM

## 2025-07-01 PROBLEM — Z79.01 ANTICOAGULATED ON ELIQUIS: Status: ACTIVE | Noted: 2025-07-01

## 2025-07-01 LAB
ABO GROUP BLD: NORMAL
ALBUMIN SERPL BCG-MCNC: 3.9 G/DL (ref 3.5–5)
ALP SERPL-CCNC: 53 U/L (ref 34–104)
ALT SERPL W P-5'-P-CCNC: 13 U/L (ref 7–52)
ANION GAP SERPL CALCULATED.3IONS-SCNC: 6 MMOL/L (ref 4–13)
APTT PPP: 26 SECONDS (ref 23–34)
AST SERPL W P-5'-P-CCNC: 15 U/L (ref 13–39)
ATRIAL RATE: 59 BPM
BASOPHILS # BLD AUTO: 0.05 THOUSANDS/ÂΜL (ref 0–0.1)
BASOPHILS NFR BLD AUTO: 1 % (ref 0–1)
BILIRUB SERPL-MCNC: 0.43 MG/DL (ref 0.2–1)
BLD GP AB SCN SERPL QL: NEGATIVE
BUN SERPL-MCNC: 11 MG/DL (ref 5–25)
CALCIUM SERPL-MCNC: 8.8 MG/DL (ref 8.4–10.2)
CARDIAC TROPONIN I PNL SERPL HS: 3 NG/L (ref ?–50)
CHLORIDE SERPL-SCNC: 107 MMOL/L (ref 96–108)
CO2 SERPL-SCNC: 25 MMOL/L (ref 21–32)
CREAT SERPL-MCNC: 0.88 MG/DL (ref 0.6–1.3)
EOSINOPHIL # BLD AUTO: 0.09 THOUSAND/ÂΜL (ref 0–0.61)
EOSINOPHIL NFR BLD AUTO: 2 % (ref 0–6)
ERYTHROCYTE [DISTWIDTH] IN BLOOD BY AUTOMATED COUNT: 15.1 % (ref 11.6–15.1)
GFR SERPL CREATININE-BSD FRML MDRD: 94 ML/MIN/1.73SQ M
GLUCOSE SERPL-MCNC: 101 MG/DL (ref 65–140)
HCT VFR BLD AUTO: 39.1 % (ref 36.5–49.3)
HGB BLD-MCNC: 12.2 G/DL (ref 12–17)
IMM GRANULOCYTES # BLD AUTO: 0.01 THOUSAND/UL (ref 0–0.2)
IMM GRANULOCYTES NFR BLD AUTO: 0 % (ref 0–2)
INR PPP: 1.01 (ref 0.85–1.19)
LIPASE SERPL-CCNC: 27 U/L (ref 11–82)
LYMPHOCYTES # BLD AUTO: 1.09 THOUSANDS/ÂΜL (ref 0.6–4.47)
LYMPHOCYTES NFR BLD AUTO: 18 % (ref 14–44)
MCH RBC QN AUTO: 23.8 PG (ref 26.8–34.3)
MCHC RBC AUTO-ENTMCNC: 31.2 G/DL (ref 31.4–37.4)
MCV RBC AUTO: 76 FL (ref 82–98)
MONOCYTES # BLD AUTO: 0.45 THOUSAND/ÂΜL (ref 0.17–1.22)
MONOCYTES NFR BLD AUTO: 7 % (ref 4–12)
NEUTROPHILS # BLD AUTO: 4.41 THOUSANDS/ÂΜL (ref 1.85–7.62)
NEUTS SEG NFR BLD AUTO: 72 % (ref 43–75)
NRBC BLD AUTO-RTO: 0 /100 WBCS
P AXIS: 46 DEGREES
PLATELET # BLD AUTO: 246 THOUSANDS/UL (ref 149–390)
PMV BLD AUTO: 9.4 FL (ref 8.9–12.7)
POTASSIUM SERPL-SCNC: 4.2 MMOL/L (ref 3.5–5.3)
PR INTERVAL: 150 MS
PROT SERPL-MCNC: 6.2 G/DL (ref 6.4–8.4)
PROTHROMBIN TIME: 14 SECONDS (ref 12.3–15)
QRS AXIS: -1 DEGREES
QRSD INTERVAL: 82 MS
QT INTERVAL: 426 MS
QTC INTERVAL: 421 MS
RBC # BLD AUTO: 5.12 MILLION/UL (ref 3.88–5.62)
RH BLD: POSITIVE
S PYO DNA THROAT QL NAA+PROBE: NOT DETECTED
SODIUM SERPL-SCNC: 138 MMOL/L (ref 135–147)
SPECIMEN EXPIRATION DATE: NORMAL
T WAVE AXIS: 44 DEGREES
VENTRICULAR RATE: 59 BPM
WBC # BLD AUTO: 6.1 THOUSAND/UL (ref 4.31–10.16)

## 2025-07-01 PROCEDURE — 93005 ELECTROCARDIOGRAM TRACING: CPT

## 2025-07-01 PROCEDURE — 83690 ASSAY OF LIPASE: CPT

## 2025-07-01 PROCEDURE — 80053 COMPREHEN METABOLIC PANEL: CPT

## 2025-07-01 PROCEDURE — 96375 TX/PRO/DX INJ NEW DRUG ADDON: CPT

## 2025-07-01 PROCEDURE — 99283 EMERGENCY DEPT VISIT LOW MDM: CPT

## 2025-07-01 PROCEDURE — 93010 ELECTROCARDIOGRAM REPORT: CPT | Performed by: INTERNAL MEDICINE

## 2025-07-01 PROCEDURE — 87651 STREP A DNA AMP PROBE: CPT

## 2025-07-01 PROCEDURE — 74177 CT ABD & PELVIS W/CONTRAST: CPT

## 2025-07-01 PROCEDURE — 99214 OFFICE O/P EST MOD 30 MIN: CPT | Performed by: INTERNAL MEDICINE

## 2025-07-01 PROCEDURE — 85610 PROTHROMBIN TIME: CPT | Performed by: EMERGENCY MEDICINE

## 2025-07-01 PROCEDURE — 85730 THROMBOPLASTIN TIME PARTIAL: CPT | Performed by: EMERGENCY MEDICINE

## 2025-07-01 PROCEDURE — 85025 COMPLETE CBC W/AUTO DIFF WBC: CPT

## 2025-07-01 PROCEDURE — 86901 BLOOD TYPING SEROLOGIC RH(D): CPT | Performed by: EMERGENCY MEDICINE

## 2025-07-01 PROCEDURE — 36415 COLL VENOUS BLD VENIPUNCTURE: CPT

## 2025-07-01 PROCEDURE — 99215 OFFICE O/P EST HI 40 MIN: CPT | Performed by: SURGERY

## 2025-07-01 PROCEDURE — 96374 THER/PROPH/DIAG INJ IV PUSH: CPT

## 2025-07-01 PROCEDURE — 86850 RBC ANTIBODY SCREEN: CPT | Performed by: EMERGENCY MEDICINE

## 2025-07-01 PROCEDURE — 99285 EMERGENCY DEPT VISIT HI MDM: CPT

## 2025-07-01 PROCEDURE — 99291 CRITICAL CARE FIRST HOUR: CPT | Performed by: EMERGENCY MEDICINE

## 2025-07-01 PROCEDURE — 84484 ASSAY OF TROPONIN QUANT: CPT

## 2025-07-01 PROCEDURE — 96365 THER/PROPH/DIAG IV INF INIT: CPT

## 2025-07-01 PROCEDURE — 86900 BLOOD TYPING SEROLOGIC ABO: CPT | Performed by: EMERGENCY MEDICINE

## 2025-07-01 RX ORDER — PANTOPRAZOLE SODIUM 40 MG/1
40 TABLET, DELAYED RELEASE ORAL
Qty: 30 TABLET | Refills: 90 | Status: SHIPPED | OUTPATIENT
Start: 2025-07-01

## 2025-07-01 RX ORDER — PANTOPRAZOLE SODIUM 40 MG/1
40 TABLET, DELAYED RELEASE ORAL
Status: DISCONTINUED | OUTPATIENT
Start: 2025-07-01 | End: 2025-07-01 | Stop reason: HOSPADM

## 2025-07-01 RX ORDER — HYDRALAZINE HYDROCHLORIDE 20 MG/ML
5 INJECTION INTRAMUSCULAR; INTRAVENOUS ONCE
Status: COMPLETED | OUTPATIENT
Start: 2025-07-01 | End: 2025-07-01

## 2025-07-01 RX ADMIN — NICARDIPINE HYDROCHLORIDE 2 MG/HR: 25 INJECTION, SOLUTION INTRAVENOUS at 08:32

## 2025-07-01 RX ADMIN — IOHEXOL 85 ML: 350 INJECTION, SOLUTION INTRAVENOUS at 07:33

## 2025-07-01 RX ADMIN — HYDRALAZINE HYDROCHLORIDE 5 MG: 20 INJECTION, SOLUTION INTRAMUSCULAR; INTRAVENOUS at 08:20

## 2025-07-01 RX ADMIN — Medication 2000 UNITS: at 08:36

## 2025-07-01 RX ADMIN — DESMOPRESSIN ACETATE 41.6 MCG: 4 INJECTION, SOLUTION INTRAVENOUS; SUBCUTANEOUS at 08:49

## 2025-07-01 RX ADMIN — PANTOPRAZOLE SODIUM 40 MG: 40 TABLET, DELAYED RELEASE ORAL at 14:23

## 2025-07-01 NOTE — QUICK NOTE
"VASCULAR SURGERY QUICK NOTE    Patient presenting with epigastric pain and CT with evidence of AAA with increased size measuring up to 5cm with tiny focus of contrast in the thrombosed portion of th aneurysm, new compared to the prior CT, may represent blood dissecting in tot he intramural thrombus and can be a sign of impending rupture, noting that no other signs of impending rupture are present.     Patient reports 1 week of periumbilical/epigastric pain with associated nausea, 2 episodes of emesis 2 days prior. He reports pain \"feels like heartburn in his stomach\" with radiation up into his chest. Denies back pain. Patient wife does note recent sick contact with family member with strep throat, thought he was feeling unwell 2/2 to that.     Patient reports nausea has improved on evaluation this morning.     Patient is well-appearing, lying comfortably in bed, hypertensive to SBP 170s. Abdomen is nondistended, soft and nontender to light palpation. 2+ femoral pulses.     Transfer to [facility] already initiated by ED/[facility] vascular team prior to evaluation.     notified and agreed with transfer.     Silvana Crenshaw  07/01/25    "

## 2025-07-01 NOTE — TELEPHONE ENCOUNTER
----- Message from Brian Ritchie MD sent at 7/1/2025  3:09 PM EDT -----  Odalis this is brian GI fellow, this pt was discharged from hospitals today where he presented with epigastric pain. Can we please get him set up for outpt follow up in 3-4 weeks to discuss symptoms please?    Thanks,    Brian

## 2025-07-01 NOTE — ED PROVIDER NOTES
Time reflects when diagnosis was documented in both MDM as applicable and the Disposition within this note       Time User Action Codes Description Comment    7/1/2025  3:01 PM Rebecca Mcallister Add [I71.40] AAA (abdominal aortic aneurysm) (HCC)           ED Disposition       ED Disposition   Transfer to Another Facility-In Network    Condition   --    Date/Time   Tue Jul 1, 2025  8:25 AM    Comment   Shilo Olmos should be transferred out to [facility].               Assessment & Plan       Medical Decision Making  59-year-old male presents with epigastric abdominal pain for the past week  At risk for diverticulitis, diverticulosis, viral gastroenteritis, AAA, pancreatitis, hepatitis, appendicitis, GERD, etc.  Patient does have no peripheral vascular disease as well as abdominal aortic aneurysm for which he has been monitoring yearly  Patient denies any pain or nausea medications at this time  CBC shows no leukocytosis, no anemia  CMP shows no electrolyte abnormalities with kidney hepatic function at baseline  Lipase of 27 no pancreatitis  Initial troponin of 3 no ACS  Strep PCR negative  Patient's PT PTT within normal limits, type and screen performed  CT abdomen pelvis shows partially thrombosed fusiform infrarenal abdominal aortic aneurysm measuring up to 5 cm increasing in size since 2023 with a tiny foci of contrast in the thrombosed portion of the aneurysm  Pain likely secondary to possibly Ciara-rupture of aortic aneurysm  Patient given Kcentra to reverse anticoagulation  Patient given hydralazine to control blood pressure for specific range  Contacted vascular surgery who recommended stricter blood sugar range, priority 1 transfer to [place] for possible surgical intervention, and anticoagulation reversal  Discussed all findings with patient, discussed risk and benefits of performing anticoagulation reversal  Patient agreeable to plan, has no further questions at this time, consenting to transfer  Patient in  "stable condition, blood pressure tightly controlled, pain minimal at this time  Critical care transfer team arrived, patient stable condition, cleared for transport to [facility] for further treatment    Amount and/or Complexity of Data Reviewed  Labs: ordered.  Radiology: ordered.    Risk  Prescription drug management.             Medications   iohexol (OMNIPAQUE) 350 MG/ML injection (MULTI-DOSE) 85 mL (85 mL Intravenous Given 7/1/25 0733)   hydrALAZINE (APRESOLINE) injection 5 mg (5 mg Intravenous Given 7/1/25 0820)   prothrombin complex concentrate (human) (Kcentra) 2,000 Units Infusion 80 mL (2,000 Units Intravenous Given 7/1/25 0836)   desmopressin (DDAVP) 41.6 mcg in sodium chloride 0.9 % 50 mL IVPB (41.6 mcg Intravenous New Bag 7/1/25 0849)       ED Risk Strat Scores                    No data recorded        SBIRT 22yo+      Flowsheet Row Most Recent Value   Initial Alcohol Screen: US AUDIT-C     1. How often do you have a drink containing alcohol? 0 Filed at: 07/01/2025 0648   2. How many drinks containing alcohol do you have on a typical day you are drinking?  0 Filed at: 07/01/2025 0648   3a. Male UNDER 65: How often do you have five or more drinks on one occasion? 0 Filed at: 07/01/2025 0648   3b. FEMALE Any Age, or MALE 65+: How often do you have 4 or more drinks on one occassion? 0 Filed at: 07/01/2025 0648   Audit-C Score 0 Filed at: 07/01/2025 0648   KAVITA: How many times in the past year have you...    Used an illegal drug or used a prescription medication for non-medical reasons? Never Filed at: 07/01/2025 0648                            History of Present Illness       Chief Complaint   Patient presents with    Abdominal Pain     Reports abdominal cramping x 1 week states \"feels like heart burn in my stomach\" also reports nausea with 1 episode of vomiting. Denies diarrhea or constipation. Concerned about h/o AAA       Past Medical History[1]   Past Surgical History[2]   Family History[3]   Social " History[4]   E-Cigarette/Vaping    E-Cigarette Use Never User       E-Cigarette/Vaping Substances    Nicotine No     THC No     CBD No     Flavoring No     Other No     Unknown No       I have reviewed and agree with the history as documented.     59-year-old male presents with epigastric abdominal pain for the past week.  Patient states that the pain started after spending time with his grandchildren a week ago.  Patient states that many of his grandchildren have been since diagnosed with strep throat.  Patient states that the abdominal pain occurs at all hours of the day but does seem to be worsened with eating.  Pain is currently a 3 out of 10 intensity located in the mid epigastrium.  Patient states that he had multiple episodes of nonbloody/nonbilious emesis yesterday.  Patient denies urinary changes, constipation, diarrhea, fever, chills, chest pain, shortness of breath.      Abdominal Pain  Associated symptoms: nausea and vomiting    Associated symptoms: no chest pain, no chills, no constipation, no cough, no diarrhea, no dysuria, no fever, no hematuria, no shortness of breath and no sore throat        Review of Systems   Constitutional:  Positive for activity change. Negative for chills and fever.   HENT:  Negative for ear pain and sore throat.    Eyes:  Negative for pain and visual disturbance.   Respiratory:  Negative for cough and shortness of breath.    Cardiovascular:  Negative for chest pain and palpitations.   Gastrointestinal:  Positive for abdominal pain, nausea and vomiting. Negative for constipation and diarrhea.   Genitourinary:  Negative for dysuria and hematuria.   Musculoskeletal:  Negative for arthralgias and back pain.   Skin:  Negative for color change and rash.   Neurological:  Negative for seizures, syncope and light-headedness.   All other systems reviewed and are negative.          Objective       ED Triage Vitals [07/01/25 0604]   Temperature Pulse Blood Pressure Respirations SpO2  Patient Position - Orthostatic VS   97.9 °F (36.6 °C) 61 (!) 169/109 17 96 % Sitting      Temp Source Heart Rate Source BP Location FiO2 (%) Pain Score    Oral Monitor Right arm -- 5      Vitals      Date and Time Temp Pulse SpO2 Resp BP Pain Score FACES Pain Rating User   07/01/25 0853 -- -- -- -- 155/113 -- -- ALONSO   07/01/25 0848 -- 73 -- -- 155/113 -- --    07/01/25 0841 -- 61 95 % -- 160/110 -- --    07/01/25 0832 -- 61 95 % -- 173/112 137 -- --    07/01/25 0830 -- 63 95 % -- -- -- --    07/01/25 0820 -- -- -- -- 175/104 -- --    07/01/25 0700 -- 58 93 % -- 165/103 -- --    07/01/25 0645 -- 59 94 % -- 139/98 -- --    07/01/25 0604 97.9 °F (36.6 °C) 61 96 % 17 169/109 5 -- DB            Physical Exam  Vitals and nursing note reviewed.   Constitutional:       General: He is not in acute distress.     Appearance: He is well-developed. He is not toxic-appearing.   HENT:      Head: Normocephalic and atraumatic.     Eyes:      Conjunctiva/sclera: Conjunctivae normal.       Cardiovascular:      Rate and Rhythm: Normal rate and regular rhythm.      Heart sounds: No murmur heard.     No friction rub. No gallop.   Pulmonary:      Effort: Pulmonary effort is normal. No respiratory distress.      Breath sounds: No stridor. No wheezing, rhonchi or rales.   Abdominal:      Palpations: Abdomen is soft.      Tenderness: There is abdominal tenderness in the epigastric area. There is guarding. There is no rebound. Negative signs include Morse's sign, Rovsing's sign and McBurney's sign.     Musculoskeletal:         General: No swelling.      Cervical back: Neck supple.     Skin:     General: Skin is warm and dry.      Capillary Refill: Capillary refill takes less than 2 seconds.     Neurological:      Mental Status: He is alert.     Psychiatric:         Mood and Affect: Mood normal.         Results Reviewed       Procedure Component Value Units Date/Time    Protime-INR [[id number]]  (Normal) Collected: 07/01/25 0855     Lab Status: Final result Specimen: Blood from Arm, Left Updated: 07/01/25 0900     Protime 14.0 seconds      INR 1.01    Narrative:      INR Therapeutic Range    Indication                                             INR Range      Atrial Fibrillation                                               2.0-3.0  Hypercoagulable State                                    2.0.2.3  Left Ventricular Asist Device                            2.0-3.0  Mechanical Heart Valve                                  -    Aortic(with afib, MI, embolism, HF, LA enlargement,    and/or coagulopathy)                                     2.0-3.0 (2.5-3.5)     Mitral                                                             2.5-3.5  Prosthetic/Bioprosthetic Heart Valve               2.0-3.0  Venous thromboembolism (VTE: VT, PE        2.0-3.0    APTT [[id number]]  (Normal) Collected: 07/01/25 0827    Lab Status: Final result Specimen: Blood from Arm, Left Updated: 07/01/25 0900     PTT 26 seconds     Strep A PCR [[id number]]  (Normal) Collected: 07/01/25 0646    Lab Status: Final result Specimen: Throat Updated: 07/01/25 0723     STREP A PCR Not Detected    HS Troponin 0hr (reflex protocol) [[id number]]  (Normal) Collected: 07/01/25 0640    Lab Status: Final result Specimen: Blood from Arm, Right Updated: 07/01/25 0720     hs TnI 0hr 3 ng/L     Lipase [[id number]]  (Normal) Collected: 07/01/25 0640    Lab Status: Final result Specimen: Blood from Arm, Right Updated: 07/01/25 0712     Lipase 27 u/L     CMP [[id number]]  (Abnormal) Collected: 07/01/25 0640    Lab Status: Final result Specimen: Blood from Arm, Right Updated: 07/01/25 0712     Sodium 138 mmol/L      Potassium 4.2 mmol/L      Chloride 107 mmol/L      CO2 25 mmol/L      ANION GAP 6 mmol/L      BUN 11 mg/dL      Creatinine 0.88 mg/dL      Glucose 101 mg/dL      Calcium 8.8 mg/dL      AST 15 U/L      ALT 13 U/L      Alkaline Phosphatase 53 U/L      Total Protein 6.2 g/dL      Albumin 3.9 g/dL       Total Bilirubin 0.43 mg/dL      eGFR 94 ml/min/1.73sq m     Narrative:      National Kidney Disease Foundation guidelines for Chronic Kidney Disease (CKD):     Stage 1 with normal or high GFR (GFR > 90 mL/min/1.73 square meters)    Stage 2 Mild CKD (GFR = 60-89 mL/min/1.73 square meters)    Stage 3A Moderate CKD (GFR = 45-59 mL/min/1.73 square meters)    Stage 3B Moderate CKD (GFR = 30-44 mL/min/1.73 square meters)    Stage 4 Severe CKD (GFR = 15-29 mL/min/1.73 square meters)    Stage 5 End Stage CKD (GFR <15 mL/min/1.73 square meters)  Note: GFR calculation is accurate only with a steady state creatinine    CBC and differential [[id number]]  (Abnormal) Collected: 07/01/25 0640    Lab Status: Final result Specimen: Blood from Arm, Right Updated: 07/01/25 0705     WBC 6.10 Thousand/uL      RBC 5.12 Million/uL      Hemoglobin 12.2 g/dL      Hematocrit 39.1 %      MCV 76 fL      MCH 23.8 pg      MCHC 31.2 g/dL      RDW 15.1 %      MPV 9.4 fL      Platelets 246 Thousands/uL      nRBC 0 /100 WBCs      Segmented % 72 %      Immature Grans % 0 %      Lymphocytes % 18 %      Monocytes % 7 %      Eosinophils Relative 2 %      Basophils Relative 1 %      Absolute Neutrophils 4.41 Thousands/µL      Absolute Immature Grans 0.01 Thousand/uL      Absolute Lymphocytes 1.09 Thousands/µL      Absolute Monocytes 0.45 Thousand/µL      Eosinophils Absolute 0.09 Thousand/µL      Basophils Absolute 0.05 Thousands/µL             CT abdomen pelvis with contrast   Final Interpretation by Naseem Corrigan MD (07/01 2639)      1.  Partially thrombosed fusiform infrarenal abdominal aortic aneurysm measuring up to 5 cm, increased in size since 2023, with a tiny focus of contrast in the thrombosed portion of the aneurysm, new compared to prior CT, which may represent blood    dissecting into the intramural thrombus and can be a sign of impending rupture, noting that no other signs of impending rupture are present.   2.  No additional acute  abnormality in the abdomen or pelvis.   3.  Large hiatal hernia containing a majority of the stomach, increased since 2023.         I personally discussed this study with HUGO VALLES on 7/1/2025 7:49 AM.            Workstation performed: [id number]             ECG 12 Lead Documentation Only    Date/Time: 7/1/2025 6:49 AM    Performed by: Andrei Acosta DO  Authorized by: Andrei Acosta DO    Indications / Diagnosis:  Upper abdominal pain  Patient location:  ED  Previous ECG:     Previous ECG:  Compared to current    Comparison ECG info:  9/7/2024    Similarity:  No change    Comparison to cardiac monitor: Yes    Interpretation:     Interpretation: normal    Rate:     ECG rate:  59    ECG rate assessment: bradycardic    Rhythm:     Rhythm: sinus rhythm    Ectopy:     Ectopy: none    QRS:     QRS axis:  Normal    QRS intervals:  Normal  Conduction:     Conduction: normal    ST segments:     ST segments:  Normal  T waves:     T waves: normal    Comments:      Sinus bradycardia with rate of 59.  No ST segment or T wave changes indicative of ischemia.  Intervals within normal limits.      ED Medication and Procedure Management   Prior to Admission Medications   Prescriptions Last Dose Informant Patient Reported? Taking?   Elastic Bandages & Supports (Medical Compression Thigh High) MISC  Self No No   Sig: Use daily 15-20mmHg   Patient not taking: Reported on 1/31/2025   Omega 3-6-9 Fatty Acids (OMEGA 3-6-9 PO)   Yes No   Sig: Take by mouth   acetaminophen (TYLENOL) 325 mg tablet  Self No No   Sig: Take 3 tablets (975 mg total) by mouth every 8 (eight) hours   apixaban (Eliquis) 5 mg   No No   Sig: Take 1 tablet (5 mg total) by mouth 2 (two) times a day   aspirin 81 mg chewable tablet  Self No No   Sig: Chew 1 tablet (81 mg total) daily   calcium carbonate (TUMS) 500 mg chewable tablet  Self No No   Sig: Chew 1 tablet (500 mg total) daily as needed for indigestion or heartburn   losartan (COZAAR) 100 MG tablet   No No    Sig: Take 1 tablet (100 mg total) by mouth daily   methocarbamol (ROBAXIN) 500 mg tablet   No No   Sig: Take 1 tablet (500 mg total) by mouth 3 (three) times a day   predniSONE 5 mg tablet   No No   Sig: Take with the 1 mg tablets. Take 9mg daily for 30 days, then 8mg daily for 30 days, then 7mg daily for 30 days, then 6mg daily for 30 days, then 5mg daily for 30 days, then 4mg daily for 30 days, then 3mg daily for 30 days, then 2mg daily for 30 days, then 1mg daily for 30 days, then stop      Facility-Administered Medications: None     Discharge Medication List as of 7/1/2025  9:01 AM        CONTINUE these medications which have NOT CHANGED    Details   acetaminophen (TYLENOL) 325 mg tablet Take 3 tablets (975 mg total) by mouth every 8 (eight) hours, Starting Sun 1/21/2024, No Print      apixaban (Eliquis) 5 mg Take 1 tablet (5 mg total) by mouth 2 (two) times a day, Starting Sun 6/22/2025, Normal      aspirin 81 mg chewable tablet Chew 1 tablet (81 mg total) daily, Starting Mon 11/20/2023, Until Fri 1/31/2025, Normal      calcium carbonate (TUMS) 500 mg chewable tablet Chew 1 tablet (500 mg total) daily as needed for indigestion or heartburn, Starting Sun 1/21/2024, No Print      Elastic Bandages & Supports (Medical Compression Thigh High) MISC Use daily 15-20mmHg, Starting Thu 5/9/2024, Print      losartan (COZAAR) 100 MG tablet Take 1 tablet (100 mg total) by mouth daily, Starting Wed 5/7/2025, Normal      methocarbamol (ROBAXIN) 500 mg tablet Take 1 tablet (500 mg total) by mouth 3 (three) times a day, Starting Thu 12/5/2024, Normal      Omega 3-6-9 Fatty Acids (OMEGA 3-6-9 PO) Take by mouth, Historical Med      !! predniSONE 5 mg tablet Take with the 1 mg tablets. Take 9mg daily for 30 days, then 8mg daily for 30 days, then 7mg daily for 30 days, then 6mg daily for 30 days, then 5mg daily for 30 days, then 4mg daily for 30 days, then 3mg daily for 30 days, then 2mg daily for 30 days, th en 1mg daily for 30  days, then stop, Normal      Cholecalciferol (Vitamin D) 50 MCG (2000 UT) tablet Take 2,000 Units by mouth daily, Historical Med      ferrous sulfate 324 (65 Fe) mg Take 1 tablet (324 mg total) by mouth 2 (two) times a day before meals, Starting Tue 10/1/2024, Normal      !! predniSONE 1 mg tablet Take with the 5 mg tablets. Take 9mg daily for 30 days, then 8mg daily for 30 days, then 7mg daily for 30 days, then 6mg daily for 30 days, then 5mg daily for 30 days, then 4mg daily for 30 days, then 3mg daily for 30 days, then 2mg daily for 30 days, th en 1mg daily for 30 days, then stop, Normal      rosuvastatin (CRESTOR) 10 MG tablet TAKE 1 TABLET BY MOUTH EVERY DAY, Starting Tue 10/1/2024, Normal      sodium picosulfate, magnesium oxide, citric acid (Clenpiq) oral solution Take 175 mL (1 bottle) the evening before the colonoscopy, between 5 PM and 9 PM, followed by a second 175 mL bottle 5 hours before the colonoscopy., Normal       !! - Potential duplicate medications found. Please discuss with provider.        No discharge procedures on file.  ED SEPSIS DOCUMENTATION   Time reflects when diagnosis was documented in both MDM as applicable and the Disposition within this note       Time User Action Codes Description Comment    7/1/2025  3:01 PM Rebecca Mcallister Add [I71.40] AAA (abdominal aortic aneurysm) (HCC)                      [1]   Past Medical History:  Diagnosis Date    Abdominal aortic aneurysm (AAA) (HCC)     Colon polyp     Hyperlipidemia     Hypertension     Polymyalgia rheumatica (HCC)     Psoriasis    [2]   Past Surgical History:  Procedure Laterality Date    CLOSURE WOUND Left 01/19/2024    Procedure: CLOSURE LEFT LOWER EXTREMITY WOUND, WASHOUT;  Surgeon: Parker Valdez MD;  Location: [facility];  Service: Vascular    EVACUATION OF HEMATOMA Left 01/17/2024    Procedure: EVACUATION/ DRAINAGE HEMATOMA, EXPLORATION ON LEFT LEG BYPASS, CONTROLL OF BLEEDING;  Surgeon: Mariana Vargas DO;   Location: [facility];  Service: Vascular    KNEE SURGERY Bilateral     AR BYPASS W/VEIN FEMORAL-POPLITEAL Left 01/16/2024    Procedure: BYPASS FEMORAL-POPLITEAL -Left above knee popliteal to below knee popliteal artery bypass WITH REVERSE SAPHENOUS VEIN GRAFT; EXCLUSION OF POPLITEAL ANEURYSM;  Surgeon: Parker Valdez MD;  Location: [facility];  Service: Vascular   [3]   Family History  Problem Relation Name Age of Onset    Hypertension Mother      No Known Problems Father     [4]   Social History  Tobacco Use    Smoking status: Former     Current packs/day: 0.25     Average packs/day: 0.3 packs/day for 32.0 years (8.0 ttl pk-yrs)     Types: Cigarettes    Smokeless tobacco: Never    Tobacco comments:     1 PPD x 15 years - As per [place]    Vaping Use    Vaping status: Never Used   Substance Use Topics    Alcohol use: Yes     Comment: rare social use one time per month    Drug use: Never        Andrei Acosta DO  07/03/25 [id number]

## 2025-07-01 NOTE — ASSESSMENT & PLAN NOTE
--reason for anticoagulation: L popl aneurysm repair/ graft patency, R thrombosed popl aneurysm, h/o DVT  --received DDAVP & Kcentra for reversal in anticipation of need for rAAA repair  --Eliquis held

## 2025-07-01 NOTE — H&P
H&P - Vascular Surgery   Name: Shilo Olmos 59 y.o. male I MRN: [id number]  Unit/Bed#: ED 21 I Date of Admission: 7/1/2025   Date of Service: 7/1/2025 I Hospital Day: 0         Addendum/ Dispo planning (7/1/2025, 3:15pm):   --Ongoing eval & discussion w/ pt/ wife, Dr Pinto, and Dr. Ritchie (GI fellow)  --pt seen by GI: HH w/ progression since 2023.  No urgent need for endoscopy.  Start PPI (protonix 40mg QD).  Outpt f/u  --ok to resume Eliquis  --no other changes to home meds        Assessment & Plan  AAA (abdominal aortic aneurysm) without rupture (HCC)  --CT reviewed w/ Dr. Pinto.  AAA not w/ findings c/f rupture.  --Patient asymptomatic from aneurysm standpoint  --NO plan for operative intervention at this juncture.  --Cont outpt surveillance w/ AOIL & LEAD as planned for 7/16/2025  --AAA is nearing size for elective repair-- outpt office visit f/u for further discussions  --cont ASA  --statin intolerant ? (Crestor- myalgias)  --liberalize BP w/ cessation of Cardene gtt  --cont Cozaar  Hiatal hernia with gastroesophageal reflux  --GI consult for ? Symptomatic HH w/ reflux, ? EGD while anticoagulation reversed ?  Anticoagulated on Eliquis  --reason for anticoagulation: L popl aneurysm repair/ graft patency, R thrombosed popl aneurysm, h/o DVT  --received DDAVP & Kcentra for reversal in anticipation of need for rAAA repair  --Eliquis held    Popliteal artery aneurysm (HCC)  --R thrombosed, L surgically excluded/ bypassed  --cont surveillance imaging  --cont ASA  --resume Eliquis (p) GI plan    PMR (polymyalgia rheumatica) (HCC)  --steroid taper as per Rheumatology            ____________________________________________________________________  Chief Complaint: Abdominal pain w/ h/o AAA-- pt transferred from [facility] ED for private vascular exam w/ CT findings c/f rAAA    HPI: Shilo Olmos is a 59 y.o. male, prior smoker (quit ~ 1yr ago) with PMR on prednisone taper as followed by Rheumatology, OA, HTN,  "and psoriasis who is [facility] for h/o DVT 9/2023, AAA, and B popliteal aneursyms-- R thrombosed, L s/p exclusion w/ Fem- popl bypass w/ rGSV 1/16/2024 ([place]) c/b surgical site hematoma s/p washout and partial closure 1/17/2024 and washout w/ completion closure 1/19/2024, maintained on Eliquis. He just recently contacted the office because he thought he was due for surveillance duplex imaging and has been scheduled for AIOL & LEAD on 7/16/2025.  Today, he presented to the [facility] ED w/ c/o abdominal pain ongoing for approximately 1 week w/o worsening. He describes pain in his abdomen as \"heartburn in my stomach\".  This pain is worse at night and w/ lying down, is accompanied by increased burping and belching. He has had intermittent nausea w/ few episodes of vomiting altho he also has sensation of vomiting into the back of his throat that is then swallowed. He denies swallowing difficulty. He denies fever/ chills/ sweats. He denies changes to bowel/ bladder habits. He was recently in contact w/ his daughter and 9 grandchildren who have tested + for strep.    Workup at [facility] w/ CTa/p (IV) revealed \" Partially thrombosed fusiform infrarenal abdominal aortic aneurysm measuring up to 5 cm, increased in size since 2023, with a tiny focus of contrast in the thrombosed portion of the aneurysm, new compared to prior CT, which may represent blood dissecting into the intramural thrombus and can be a sign of impending rupture, noting that no other signs of impending rupture are present.\"  Vascular Surgery was contacted and transfer to [facility] for private vascular exam/ escalated care was planned.    In review of [facility] ED mgmt, patient was noted to be HTN w/ BPs 139- 173/ 98- 113.  He was given hydralazine 5mg then initiated on a Cardene gtt.  With h/o Eliquis anticoagulation, he was given DDAVP and KCentra as reversal agents in anticipation of surgery.       CTa/p (IV):  \"1.  Partially thrombosed fusiform " "infrarenal abdominal aortic aneurysm measuring up to 5 cm, increased in size since 2023, with a tiny focus of contrast in the thrombosed portion of the aneurysm, new compared to prior CT, which may represent blood   dissecting into the intramural thrombus and can be a sign of impending rupture, noting that no other signs of impending rupture are present.  2.  No additional acute abnormality in the abdomen or pelvis.  3.  Large hiatal hernia containing a majority of the stomach, increased since 2023.\"        Review of Systems:  General: positive for  - as noted in HPI  Cardiovascular: no chest pain or dyspnea on exertion  Respiratory: no cough, shortness of breath, or wheezing  Gastrointestinal: positive for - as noted in HPI  Genitourinary: no dysuria, trouble voiding, or hematuria  Musculoskeletal: negative  Neurological: no TIA or stroke symptoms  Hematological and Lymphatic: negative.  +Eliquis   Dermatological: positive for Psoriasis  Psychological: negative  Ophthalmic: negative  ENT: negative    Past Medical History:  Past Medical History[1]    Past Surgical History:  Past Surgical History[2]    Social History:  Social History     Substance and Sexual Activity   Alcohol Use Yes    Comment: rare social use one time per month     Social History     Substance and Sexual Activity   Drug Use Never     Tobacco Use History[3]    Family History:  Family History[4]    Allergies:  Allergies[5]    Medications:  Home meds:   Prior to Admission medications    Medication Sig Start Date End Date Taking? Authorizing Provider   acetaminophen (TYLENOL) 325 mg tablet Take 3 tablets (975 mg total) by mouth every 8 (eight) hours 1/21/24   Dayan Johnson PA-C   apixaban (Eliquis) 5 mg Take 1 tablet (5 mg total) by mouth 2 (two) times a day 6/22/25   Parker Valdez MD   aspirin 81 mg chewable tablet Chew 1 tablet (81 mg total) daily 11/20/23 1/31/25  Parker Valdez MD   calcium carbonate (TUMS) 500 mg " chewable tablet Chew 1 tablet (500 mg total) daily as needed for indigestion or heartburn 1/21/24   Dayan Johnsno PA-C   Cholecalciferol (Vitamin D) 50 MCG (2000 UT) tablet Take 2,000 Units by mouth daily  Patient not taking: Reported on 5/28/2025    Historical Provider, MD   Elastic Bandages & Supports (Medical Compression Thigh High) MISC Use daily 15-20mmHg  Patient not taking: Reported on 1/31/2025 5/9/24   Parker Valdez MD   ferrous sulfate 324 (65 Fe) mg Take 1 tablet (324 mg total) by mouth 2 (two) times a day before meals  Patient not taking: Reported on 5/28/2025 10/1/24   Naseem Corrigan DO   losartan (COZAAR) 100 MG tablet Take 1 tablet (100 mg total) by mouth daily 5/7/25   Jef Leblanc MD   methocarbamol (ROBAXIN) 500 mg tablet Take 1 tablet (500 mg total) by mouth 3 (three) times a day 12/5/24   Naseem Corrigan,    Omega 3-6-9 Fatty Acids (OMEGA 3-6-9 PO) Take by mouth    Historical Provider, MD   predniSONE 1 mg tablet Take with the 5 mg tablets. Take 9mg daily for 30 days, then 8mg daily for 30 days, then 7mg daily for 30 days, then 6mg daily for 30 days, then 5mg daily for 30 days, then 4mg daily for 30 days, then 3mg daily for 30 days, then 2mg daily for 30 days, then 1mg daily for 30 days, then stop  Patient not taking: Reported on 5/28/2025 12/10/24   Samaria Greer MD   predniSONE 5 mg tablet Take with the 1 mg tablets. Take 9mg daily for 30 days, then 8mg daily for 30 days, then 7mg daily for 30 days, then 6mg daily for 30 days, then 5mg daily for 30 days, then 4mg daily for 30 days, then 3mg daily for 30 days, then 2mg daily for 30 days, then 1mg daily for 30 days, then stop 12/10/24   Samaria Greer MD   rosuvastatin (CRESTOR) 10 MG tablet TAKE 1 TABLET BY MOUTH EVERY DAY  Patient not taking: Reported on 10/28/2024 10/1/24   Naseem Corrigan    sodium picosulfate, magnesium oxide, citric acid (Clenpiq) oral solution Take 175 mL (1 bottle) the evening before  the colonoscopy, between 5 PM and 9 PM, followed by a second 175 mL bottle 5 hours before the colonoscopy.  Patient not taking: Reported on 9/30/2024 9/4/24   Nicole Hernandez PA-C         Vitals:  /86   Pulse 64   Temp 97.8 °F (36.6 °C) (Oral)   Resp 14   SpO2 93%   There is no height or weight on file to calculate BMI.  Weight (last 2 days)       None            I/Os:  No intake or output data in the 24 hours ending 07/01/25 1127    Physical Exam:    General appearance: alert, appears stated age, and cooperative. Non-toxic appearing. NAD. Wife bedside  Head: Normocephalic, without obvious abnormality, atraumatic  Eyes: conjunctivae/corneas clear. PERRL, EOM's intact.  Throat: lips, mucosa, and tongue normal; teeth and gums normal  Neck: no adenopathy, no JVD, and supple, symmetrical, trachea midline  Back: symmetric, no curvature. ROM normal. No CVA tenderness.  Lungs: clear to auscultation bilaterally  Chest wall: no tenderness  Heart: S1, S2 normal  Abdomen: soft, non-tender; bowel sounds normal; no masses,  no organomegaly  Mild tenderness to deep palpation of epigastium  Genitalia: deferred  Rectal: deferred  Extremities: extremities normal, atraumatic, no cyanosis or edema  Skin: Skin color, texture, turgor normal. No rashes or lesions  Neurologic: Grossly normal    Pulse exam:  Radial: Right: 2+               Left: 2+  Femoral: Right: 2+                   Left: 2+  DP: Right: 2+          Left: 2+  PT: Right: non-palpable         Left: non-palpable      Lab Results and Cultures:   COVID:   Last COVID19 Screening Values       None           CBC with diff:   Lab Results   Component Value Date    WBC 6.10 07/01/2025    HGB 12.2 07/01/2025    HCT 39.1 07/01/2025    MCV 76 (L) 07/01/2025     07/01/2025    RBC 5.12 07/01/2025    MCH 23.8 (L) 07/01/2025    MCHC 31.2 (L) 07/01/2025    RDW 15.1 07/01/2025    MPV 9.4 07/01/2025    NRBC 0 07/01/2025      BMP/CMP:  Lab Results   Component Value Date     "K 4.2 07/01/2025    K 4.5 08/24/2021     07/01/2025     08/24/2021    CO2 25 07/01/2025    CO2 22 08/24/2021    BUN 11 07/01/2025    BUN 16 08/24/2021    CREATININE 0.88 07/01/2025    CALCIUM 8.8 07/01/2025    CALCIUM 9.2 08/24/2021    AST 15 07/01/2025    AST 21 08/24/2021    ALT 13 07/01/2025    ALT 16 08/24/2021    ALKPHOS 53 07/01/2025    ALKPHOS 57 08/24/2021    EGFR 94 07/01/2025   ,     Coags:   Lab Results   Component Value Date    PTT 26 07/01/2025    INR 1.01 07/01/2025   ,   Results from last 7 days   Lab Units 07/01/25  0827   PTT seconds 26   INR  1.01        Lipid Panel: No results found for: \"CHOL\"  Lab Results   Component Value Date    HDL 53 01/31/2025     Lab Results   Component Value Date    LDLCALC 147 (H) 01/31/2025     Lab Results   Component Value Date    TRIG 129 01/31/2025       HgbA1c: No results found for: \"HGBA1C\"    Imaging:  CTa/p (IV):  \"1.  Partially thrombosed fusiform infrarenal abdominal aortic aneurysm measuring up to 5 cm, increased in size since 2023, with a tiny focus of contrast in the thrombosed portion of the aneurysm, new compared to prior CT, which may represent blood   dissecting into the intramural thrombus and can be a sign of impending rupture, noting that no other signs of impending rupture are present.  2.  No additional acute abnormality in the abdomen or pelvis.  3.  Large hiatal hernia containing a majority of the stomach, increased since 2023.\"    8/9/2024 LEAD  -R: Known occl of proximal popliteal with recon and TPT.  CFA ectatic 1.9 cm.  0.85/ 121/ 77  -L patent distal SFA-popliteal BPG.  Popliteal artery 1.16 cm beyond the distal anastomosis.  1.0 6/178/130    4/26/2024 AOIL-infrarenal fusiform AAA 4 cm x 3.8 cm.  R JENNIFER ectatic 1.7 cm.  Celiac and SMA patent.  Proximal renal arteries patent.    11/6/2023 CTA- infrarenal AAA 4.4cm.  Lg HH. Cholelithiasis    EKG, Pathology, and Other Studies:   VTE Prophylaxis: Eliquis-- reversed w/ DDAVP/ KCentra   "   Code Status: Prior  Advance Directive and Living Will:      Power of :    POLST:      Angeal Mayers PA-C  7/1/2025              [1]   Past Medical History:  Diagnosis Date    Abdominal aortic aneurysm (AAA) (HCC)     Colon polyp     Hyperlipidemia     Hypertension     Polymyalgia rheumatica (HCC)     Psoriasis    [2]   Past Surgical History:  Procedure Laterality Date    CLOSURE WOUND Left 01/19/2024    Procedure: CLOSURE LEFT LOWER EXTREMITY WOUND, WASHOUT;  Surgeon: Parker Valdez MD;  Location: [facility];  Service: Vascular    EVACUATION OF HEMATOMA Left 01/17/2024    Procedure: EVACUATION/ DRAINAGE HEMATOMA, EXPLORATION ON LEFT LEG BYPASS, CONTROLL OF BLEEDING;  Surgeon: Mariana Vargas DO;  Location: [facility];  Service: Vascular    KNEE SURGERY Bilateral     KY BYPASS W/VEIN FEMORAL-POPLITEAL Left 01/16/2024    Procedure: BYPASS FEMORAL-POPLITEAL -Left above knee popliteal to below knee popliteal artery bypass WITH REVERSE SAPHENOUS VEIN GRAFT; EXCLUSION OF POPLITEAL ANEURYSM;  Surgeon: Parker Valdez MD;  Location: [facility];  Service: Vascular   [3]   Social History  Tobacco Use   Smoking Status Former    Current packs/day: 0.25    Average packs/day: 0.3 packs/day for 32.0 years (8.0 ttl pk-yrs)    Types: Cigarettes   Smokeless Tobacco Never   Tobacco Comments    1 PPD x 15 years - As per Wendi    [4]   Family History  Problem Relation Name Age of Onset    Hypertension Mother      No Known Problems Father     [5]   Allergies  Allergen Reactions    Pollen Extract Sneezing

## 2025-07-01 NOTE — TELEPHONE ENCOUNTER
Caller: Jerry Correa - spouse    Doctor: Dr. Valdez    Reason for call: Patient is currently at the ED in [place] but is being transferred to the [facility]. His CT revealed AAA 5 cm which has increased in size since 2003 with possible impending rupture. Spouse is calling to make Dr Valdez aware.    Call back#: [phone]

## 2025-07-01 NOTE — ASSESSMENT & PLAN NOTE
59 y.o. male with history of PMR on prednisone, AAA, hypertension, hyperlipidemia, class I obesity (BMI 32), sigmoid diverticulosis and colon polyps who presented to [facility] earlier today due to epigastric pain for the past week leading to transfer to hospitals after CT abdomen showed concern for partially thrombosed abdominal aortic aneurysm as well as large intrathoracic hiatal hernia overall concerning for GERD versus gastritis leading to GI consult.    - No urgent need for endoscopic evaluation  - Reviewed imaging with patient, wife and mother-in-law at bedside.  Patient does have progression of hiatal hernia with majority of stomach in the intrathoracic space since at least 2023, but symptoms all started last week so this is most likely the etiology for his symptoms although certainly may be contributing  - Start pantoprazole 40 mg p.o. daily taking on an empty stomach 30 minutes before meals  - Continue PPI for 8 weeks.  If patient has improvement in symptoms can stop PPI and no need for EGD.  If patient has continued symptoms can evaluate further with outpatient EGD  - GERD lifestyle changes discussed, including avoidance of trigger foods (potential foods include coffee, caffeine, chocolate, mint, tomato-based products, spicy foods, fatty foods), avoid tight fitting clothing, elevate head of bed 30 degrees, avoid eating 2-3 hours prior to bedtime, weight loss, avoid alcohol, avoid tobacco use  - Okay for diet from GI standpoint  - If continued symptoms following EGD can follow-up with surgery for consideration of hiatal hernia repair  - Will arrange for outpatient GI follow-up

## 2025-07-01 NOTE — ED NOTES
[facility] sx team made aware of pt arrival     Sam Campbell RN  07/01/25 2975

## 2025-07-01 NOTE — ASSESSMENT & PLAN NOTE
--CT reviewed w/ Dr. Pinto.  AAA not w/ findings c/f rupture.  --Patient asymptomatic from aneurysm standpoint  --NO plan for operative intervention at this juncture.  --Cont outpt surveillance w/ AOIL & LEAD as planned for 7/16/2025  --AAA is nearing size for elective repair-- outpt office visit f/u for further discussions  --cont ASA  --statin intolerant ? (Crestor- myalgias)  --liberalize BP w/ cessation of Cardene gtt  --cont Cozaar

## 2025-07-01 NOTE — EMTALA/ACUTE CARE TRANSFER
[facility] EMERGENCY DEPARTMENT   [facility] [address]  Dept: [phone]      EMTALA TRANSFER CONSENT    NAME Shilo ABDI 1965                              MRN [id number]    I have been informed of my rights regarding examination, treatment, and transfer   by Dr. Rebecca Mcallister MD    Benefits:      Risks:        Consent for Transfer:  I acknowledge that my medical condition has been evaluated and explained to me by the emergency department physician or other qualified medical person and/or my attending physician, who has recommended that I be transferred to the service of    at  . The above potential benefits of such transfer, the potential risks associated with such transfer, and the probable risks of not being transferred have been explained to me, and I fully understand them.  The doctor has explained that, in my case, the benefits of transfer outweigh the risks.  I agree to be transferred.    I authorize the performance of emergency medical procedures and treatments upon me in both transit and upon arrival at the receiving facility.  Additionally, I authorize the release of any and all medical records to the receiving facility and request they be transported with me, if possible.  I understand that the safest mode of transportation during a medical emergency is an ambulance and that the Hospital advocates the use of this mode of transport. Risks of traveling to the receiving facility by car, including absence of medical control, life sustaining equipment, such as oxygen, and medical personnel has been explained to me and I fully understand them.    (LILY CORRECT BOX BELOW)  [  ]  I consent to the stated transfer and to be transported by ambulance/helicopter.  [  ]  I consent to the stated transfer, but refuse transportation by ambulance and accept full responsibility for my transportation by car.  I understand the  risks of non-ambulance transfers and I exonerate the Hospital and its staff from any deterioration in my condition that results from this refusal.    X___________________________________________    DATE  25  TIME________  Signature of patient or legally responsible individual signing on patient behalf           RELATIONSHIP TO PATIENT_________________________          Provider Certification    NAME Shilo Olmos                                         1965                              MRN [id number]    A medical screening exam was performed on the above named patient.  Based on the examination:    Condition Necessitating Transfer There were no encounter diagnoses.    Patient Condition:      Reason for Transfer:      Transfer Requirements: Facility     Space available and qualified personnel available for treatment as acknowledged by    Agreed to accept transfer and to provide appropriate medical treatment as acknowledged by          Appropriate medical records of the examination and treatment of the patient are provided at the time of transfer   STAFF INITIAL WHEN COMPLETED _______  Transfer will be performed by qualified personnel from    and appropriate transfer equipment as required, including the use of necessary and appropriate life support measures.    Provider Certification: I have examined the patient and explained the following risks and benefits of being transferred/refusing transfer to the patient/family:         Based on these reasonable risks and benefits to the patient and/or the unborn child(jacinda), and based upon the information available at the time of the patient’s examination, I certify that the medical benefits reasonably to be expected from the provision of appropriate medical treatments at another medical facility outweigh the increasing risks, if any, to the individual’s medical condition, and in the case of labor to the unborn child, from effecting the  transfer.    X____________________________________________ DATE 07/01/25        TIME_______      ORIGINAL - SEND TO MEDICAL RECORDS   COPY - SEND WITH PATIENT DURING TRANSFER

## 2025-07-01 NOTE — ED ATTENDING ATTESTATION
7/1/2025  I, Rebecca Mcallister MD, saw and evaluated the patient. I have discussed the patient with the resident/non-physician practitioner and agree with the resident's/non-physician practitioner's findings, Plan of Care, and MDM as documented in the resident's/non-physician practitioner's note, except where noted. All available labs and Radiology studies were reviewed.  I was present for key portions of any procedure(s) performed by the resident/non-physician practitioner and I was immediately available to provide assistance.       At this point I agree with the current assessment done in the Emergency Department.  I have conducted an independent evaluation of this patient a history and physical is as follows:    59-year-old male with a history of AAA presenting to the ER due to abdominal burning and pain.  No fevers.  No chills.  Pain will radiate into the chest.  Has had nausea and vomiting.  Feels fatigued.    Normal cardiopulmonary exam.  Abdomen soft, mild epigastric tenderness.  Lower extremities well-perfused/warm.    Agree with workup, cardiac workup, abdominal labs, CT.  This is likely reflux GERD related but with patient's significant risk factors with cardiac evaluation and evaluation of the AAA.    Received a call from radiology with potential impending AAA rupture.  Stat message sent to vascular surgeon on-call for the network, evaluated by the vascular PA at the bedside, decision made to transfer to [facility] for vascular surgery evaluation emergently.  Patient is anticoagulated with Eliquis and aspirin, with concern for impending rupture will reverse anticoagulation and aspirin, control blood pressure with Cardene.    Critical Care Time Statement: Upon my evaluation, this patient had a high probability of imminent or life-threatening deterioration due to impending AAA rupture, which required my direct attention, intervention, and personal management.  I spent a total of 62 minutes directly providing  critical care services, including interpretation of complex medical databases, evaluating for the presence of life-threatening injuries or illnesses, management of organ system failure(s) , complex medical decision making (to support/prevent further life-threatening deterioration)., interpretation of hemodynamic data, and titration of continuous IV medications (drips). This time is exclusive of procedures, teaching, treating other patients, family meetings, and any prior time recorded by providers other than myself.          ED Course  ED Course as of 07/01/25 1507   Tue Jul 01, 2025   0801 Discussed with vascular surgery, vascular JOSS to evaluate patient at bedside   0816 Concern for impending AAA rupture.  Will give Kcentra to reverse Eliquis.          Critical Care Time  Procedures

## 2025-07-01 NOTE — ASSESSMENT & PLAN NOTE
--R thrombosed, L surgically excluded/ bypassed  --cont surveillance imaging  --cont ASA  --resume Eliquis (p) GI plan

## 2025-07-16 ENCOUNTER — HOSPITAL ENCOUNTER (OUTPATIENT)
Dept: VASCULAR ULTRASOUND | Facility: HOSPITAL | Age: 60
Discharge: HOME | End: 2025-07-16
Attending: PHYSICIAN ASSISTANT
Payer: COMMERCIAL

## 2025-07-16 DIAGNOSIS — I71.40 ABDOMINAL AORTIC ANEURYSM (AAA) 3.0 CM TO 5.5 CM IN DIAMETER IN MALE (HCC): ICD-10-CM

## 2025-07-16 DIAGNOSIS — I72.4 POPLITEAL ARTERY ANEURYSM, BILATERAL (HCC): ICD-10-CM

## 2025-07-16 PROCEDURE — 93925 LOWER EXTREMITY STUDY: CPT | Performed by: SURGERY

## 2025-07-16 PROCEDURE — 93925 LOWER EXTREMITY STUDY: CPT

## 2025-07-16 PROCEDURE — 93923 UPR/LXTR ART STDY 3+ LVLS: CPT

## 2025-07-16 PROCEDURE — 93922 UPR/L XTREMITY ART 2 LEVELS: CPT | Performed by: SURGERY

## 2025-07-16 PROCEDURE — 93978 VASCULAR STUDY: CPT

## 2025-07-16 PROCEDURE — 93978 VASCULAR STUDY: CPT | Performed by: SURGERY

## 2025-07-21 DIAGNOSIS — M25.50 ARTHRALGIA, UNSPECIFIED JOINT: ICD-10-CM

## 2025-07-22 ENCOUNTER — PATIENT MESSAGE (OUTPATIENT)
Age: 60
End: 2025-07-22

## 2025-07-22 DIAGNOSIS — M35.3 PMR (POLYMYALGIA RHEUMATICA) (HCC): Primary | ICD-10-CM

## 2025-07-22 RX ORDER — PREDNISONE 10 MG/1
TABLET ORAL
Qty: 50 TABLET | Refills: 0 | OUTPATIENT
Start: 2025-07-22 | End: 2025-08-11

## 2025-07-23 RX ORDER — PREDNISONE 1 MG/1
TABLET ORAL
Qty: 150 TABLET | Refills: 1 | Status: SHIPPED | OUTPATIENT
Start: 2025-07-23

## 2025-07-24 DIAGNOSIS — R10.13 EPIGASTRIC PAIN: ICD-10-CM

## 2025-07-24 DIAGNOSIS — K21.9 HIATAL HERNIA WITH GASTROESOPHAGEAL REFLUX: ICD-10-CM

## 2025-07-24 DIAGNOSIS — K44.9 HIATAL HERNIA WITH GASTROESOPHAGEAL REFLUX: ICD-10-CM

## 2025-07-29 RX ORDER — PANTOPRAZOLE SODIUM 40 MG/1
40 TABLET, DELAYED RELEASE ORAL
Qty: 30 TABLET | Refills: 5 | Status: SHIPPED | OUTPATIENT
Start: 2025-07-29

## 2025-08-13 ENCOUNTER — OFFICE VISIT (OUTPATIENT)
Dept: FAMILY MEDICINE CLINIC | Facility: CLINIC | Age: 60
End: 2025-08-13
Payer: COMMERCIAL

## 2025-08-13 VITALS
SYSTOLIC BLOOD PRESSURE: 126 MMHG | HEART RATE: 83 BPM | DIASTOLIC BLOOD PRESSURE: 88 MMHG | BODY MASS INDEX: 32.31 KG/M2 | TEMPERATURE: 96.1 F | HEIGHT: 71 IN | WEIGHT: 230.8 LBS | RESPIRATION RATE: 16 BRPM | OXYGEN SATURATION: 96 %

## 2025-08-13 DIAGNOSIS — I10 ESSENTIAL HYPERTENSION: Primary | ICD-10-CM

## 2025-08-13 DIAGNOSIS — K44.9 HIATAL HERNIA WITH GASTROESOPHAGEAL REFLUX: ICD-10-CM

## 2025-08-13 DIAGNOSIS — M35.3 PMR (POLYMYALGIA RHEUMATICA) (HCC): ICD-10-CM

## 2025-08-13 DIAGNOSIS — R10.13 EPIGASTRIC PAIN: ICD-10-CM

## 2025-08-13 DIAGNOSIS — I71.40 ABDOMINAL AORTIC ANEURYSM (AAA) WITHOUT RUPTURE, UNSPECIFIED PART (HCC): ICD-10-CM

## 2025-08-13 DIAGNOSIS — K21.9 HIATAL HERNIA WITH GASTROESOPHAGEAL REFLUX: ICD-10-CM

## 2025-08-13 DIAGNOSIS — I82.441 ACUTE DEEP VEIN THROMBOSIS (DVT) OF TIBIAL VEIN OF RIGHT LOWER EXTREMITY (HCC): ICD-10-CM

## 2025-08-13 DIAGNOSIS — E78.00 PURE HYPERCHOLESTEROLEMIA: ICD-10-CM

## 2025-08-13 PROBLEM — M10.9 GOUT OF LEFT FOOT: Status: RESOLVED | Noted: 2023-08-22 | Resolved: 2025-08-13

## 2025-08-13 PROCEDURE — 99214 OFFICE O/P EST MOD 30 MIN: CPT | Performed by: FAMILY MEDICINE

## 2025-08-13 RX ORDER — PANTOPRAZOLE SODIUM 40 MG/1
40 TABLET, DELAYED RELEASE ORAL 2 TIMES DAILY
Qty: 60 TABLET | Refills: 2 | Status: SHIPPED | OUTPATIENT
Start: 2025-08-13 | End: 2025-08-21

## 2025-08-20 DIAGNOSIS — K44.9 HIATAL HERNIA WITH GASTROESOPHAGEAL REFLUX: ICD-10-CM

## 2025-08-20 DIAGNOSIS — R10.13 EPIGASTRIC PAIN: ICD-10-CM

## 2025-08-20 DIAGNOSIS — K21.9 HIATAL HERNIA WITH GASTROESOPHAGEAL REFLUX: ICD-10-CM

## 2025-08-21 DIAGNOSIS — K21.9 HIATAL HERNIA WITH GASTROESOPHAGEAL REFLUX: ICD-10-CM

## 2025-08-21 DIAGNOSIS — K44.9 HIATAL HERNIA WITH GASTROESOPHAGEAL REFLUX: ICD-10-CM

## 2025-08-21 DIAGNOSIS — R10.13 EPIGASTRIC PAIN: ICD-10-CM

## 2025-08-21 RX ORDER — PANTOPRAZOLE SODIUM 40 MG/1
40 TABLET, DELAYED RELEASE ORAL 2 TIMES DAILY
Qty: 180 TABLET | Refills: 1 | Status: SHIPPED | OUTPATIENT
Start: 2025-08-21 | End: 2025-08-22

## 2025-08-22 RX ORDER — RABEPRAZOLE SODIUM 20 MG/1
20 TABLET, DELAYED RELEASE ORAL DAILY
Qty: 30 TABLET | Refills: 2 | Status: SHIPPED | OUTPATIENT
Start: 2025-08-22

## (undated) DEVICE — DRESSING MEPILEX AG BORDER POST-OP 4 X 6 IN

## (undated) DEVICE — PETRI DISH STERILE

## (undated) DEVICE — KERLIX BANDAGE ROLL: Brand: KERLIX

## (undated) DEVICE — SUT PROLENE 4-0 RB-1/RB-1 36 IN 8557H

## (undated) DEVICE — GLOVE SRG BIOGEL 7.5

## (undated) DEVICE — DRAPE SURGIKIT SADDLE BAG

## (undated) DEVICE — ABDOMINAL PAD: Brand: DERMACEA

## (undated) DEVICE — MEDI-VAC YANK SUCT HNDL W/TPRD BULBOUS TIP: Brand: CARDINAL HEALTH

## (undated) DEVICE — SUT PROLENE 7-0 BV175-6 24 IN M8737

## (undated) DEVICE — SURGICEL FIBRILLAR 1 X 2

## (undated) DEVICE — DRAPE SHEET THREE QUARTER

## (undated) DEVICE — SURGICEL 2 X 3

## (undated) DEVICE — SUT MONOCRYL 4-0 PS-2 18 IN Y496G

## (undated) DEVICE — SUT SILK 2-0 18 IN A185H

## (undated) DEVICE — DRESSING MEPILEX AG BORDER POST-OP 4 X 10 IN

## (undated) DEVICE — SURGIFOAM 8.5 X 12.5

## (undated) DEVICE — TIBURON SPLIT SHEET: Brand: CONVERTORS

## (undated) DEVICE — LIGACLIP MCA MULTIPLE CLIP APPLIERS, 20 LARGE CLIPS: Brand: LIGACLIP

## (undated) DEVICE — SUT SILK 4-0 18 IN A183H

## (undated) DEVICE — COBAN 4 IN STERILE

## (undated) DEVICE — SYRINGE 10ML LL

## (undated) DEVICE — SPONGE STICK WITH PVP-I: Brand: KENDALL

## (undated) DEVICE — BETHLEHEM UNIVERSAL MINOR GEN: Brand: CARDINAL HEALTH

## (undated) DEVICE — DRAPE SHEET X-LG

## (undated) DEVICE — ACE WRAP 6 IN UNSTERILE

## (undated) DEVICE — TRAY FOLEY 16FR URIMETER SURESTEP

## (undated) DEVICE — STERILE BETHLEHEM FEM POP PACK: Brand: CARDINAL HEALTH

## (undated) DEVICE — SUT PROLENE 6-0 BV-1/BV-1 24 IN 8805H

## (undated) DEVICE — FOGARTY ARTERIAL EMBOLECTOMY CATHETER 3F 80CM: Brand: FOGARTY

## (undated) DEVICE — 1/2 FORCE SURGICAL SPRING CLIP: Brand: STEALTH® SPRING CLIP

## (undated) DEVICE — LIGACLIP MCA MULTIPLE CLIP APPLIERS, 20 SMALL CLIPS: Brand: LIGACLIP

## (undated) DEVICE — SUT MONOCRYL 2-0 CT-1 27 IN Y339H

## (undated) DEVICE — GLOVE INDICATOR PI UNDERGLOVE SZ 8 BLUE

## (undated) DEVICE — 40601 PROLONGED POSITIONING SYSTEM: Brand: 40601 PROLONGED POSITIONING SYSTEM

## (undated) DEVICE — TUBING SUCTION 5MM X 12 FT

## (undated) DEVICE — CURITY NON-ADHERENT STRIPS: Brand: CURITY

## (undated) DEVICE — PENCIL ELECTROSURG E-Z CLEAN -0035H

## (undated) DEVICE — SUT MONOCRYL 3-0 SH 27 IN Y416H

## (undated) DEVICE — ACE WRAP 4 IN UNSTERILE

## (undated) DEVICE — PAD GROUNDING ADULT

## (undated) DEVICE — INTENDED FOR TISSUE SEPARATION, AND OTHER PROCEDURES THAT REQUIRE A SHARP SURGICAL BLADE TO PUNCTURE OR CUT.: Brand: BARD-PARKER SAFETY BLADES SIZE 15, STERILE

## (undated) DEVICE — CHLORAPREP HI-LITE 26ML ORANGE

## (undated) DEVICE — DECANTER: Brand: UNBRANDED

## (undated) DEVICE — GAUZE SPONGES,16 PLY: Brand: CURITY

## (undated) DEVICE — ADHESIVE SKIN HIGH VISCOSITY EXOFIN 1ML

## (undated) DEVICE — SUT SILK 3-0 18 IN A184H